# Patient Record
Sex: FEMALE | Race: WHITE | Employment: OTHER | URBAN - METROPOLITAN AREA
[De-identification: names, ages, dates, MRNs, and addresses within clinical notes are randomized per-mention and may not be internally consistent; named-entity substitution may affect disease eponyms.]

---

## 2017-01-11 ENCOUNTER — TELEPHONE (OUTPATIENT)
Dept: BONE DENSITY | Facility: CLINIC | Age: 72
End: 2017-01-11

## 2017-01-25 ENCOUNTER — TELEPHONE (OUTPATIENT)
Dept: BONE DENSITY | Facility: CLINIC | Age: 72
End: 2017-01-25

## 2017-02-09 ENCOUNTER — TELEPHONE (OUTPATIENT)
Dept: BONE DENSITY | Facility: CLINIC | Age: 72
End: 2017-02-09

## 2017-04-05 DIAGNOSIS — J44.9 CHRONIC OBSTRUCTIVE PULMONARY DISEASE, UNSPECIFIED COPD TYPE (H): ICD-10-CM

## 2017-04-05 NOTE — TELEPHONE ENCOUNTER
tiotropium (SPIRIVA RESPIMAT) 2.5 MCG/ACT inhalation aerosol    Last Written Prescription Date: 11/3/16  Last Fill Quantity: 12 GRAMS, # refills: 0    Last Office Visit with [facility], P or [facility] prescribing provider:  11/16/16    Patient needs this sent to a new pharmacy, new prescription.  Thanks.

## 2017-07-06 ENCOUNTER — TELEPHONE (OUTPATIENT)
Dept: PEDIATRICS | Facility: CLINIC | Age: 72
End: 2017-07-06

## 2017-07-06 NOTE — TELEPHONE ENCOUNTER
CT scan of chest was ordered in 11/2016.  Order expires in November 2017.  Gave patient the number for Radiology scheduling to set up an appointment.  No further questions.  Will call back if any other questions or concerns.  CAROL Rollins RN

## 2017-07-11 ENCOUNTER — RADIANT APPOINTMENT (OUTPATIENT)
Dept: MAMMOGRAPHY | Facility: CLINIC | Age: 72
End: 2017-07-11
Attending: INTERNAL MEDICINE
Payer: COMMERCIAL

## 2017-07-11 ENCOUNTER — RADIANT APPOINTMENT (OUTPATIENT)
Dept: BONE DENSITY | Facility: CLINIC | Age: 72
End: 2017-07-11
Attending: INTERNAL MEDICINE
Payer: COMMERCIAL

## 2017-07-11 DIAGNOSIS — E78.5 HYPERLIPIDEMIA WITH TARGET LDL LESS THAN 160: ICD-10-CM

## 2017-07-11 DIAGNOSIS — M85.80 OSTEOPENIA: ICD-10-CM

## 2017-07-11 DIAGNOSIS — Z12.31 ENCOUNTER FOR SCREENING MAMMOGRAM FOR BREAST CANCER: ICD-10-CM

## 2017-07-11 PROCEDURE — 80061 LIPID PANEL: CPT | Performed by: INTERNAL MEDICINE

## 2017-07-11 PROCEDURE — 77063 BREAST TOMOSYNTHESIS BI: CPT | Mod: TC

## 2017-07-11 PROCEDURE — 36415 COLL VENOUS BLD VENIPUNCTURE: CPT | Performed by: INTERNAL MEDICINE

## 2017-07-11 PROCEDURE — G0202 SCR MAMMO BI INCL CAD: HCPCS | Mod: TC

## 2017-07-11 PROCEDURE — 77085 DXA BONE DENSITY AXL VRT FX: CPT | Performed by: FAMILY MEDICINE

## 2017-07-13 LAB
CHOLEST SERPL-MCNC: 214 MG/DL
HDLC SERPL-MCNC: 52 MG/DL
LDLC SERPL CALC-MCNC: 138 MG/DL
NONHDLC SERPL-MCNC: 162 MG/DL
TRIGL SERPL-MCNC: 122 MG/DL

## 2017-07-18 ENCOUNTER — MYC MEDICAL ADVICE (OUTPATIENT)
Dept: PEDIATRICS | Facility: CLINIC | Age: 72
End: 2017-07-18

## 2017-07-19 ENCOUNTER — HOSPITAL ENCOUNTER (OUTPATIENT)
Dept: CT IMAGING | Facility: CLINIC | Age: 72
Discharge: HOME | End: 2017-07-19
Attending: INTERNAL MEDICINE | Admitting: INTERNAL MEDICINE
Payer: COMMERCIAL

## 2017-07-19 DIAGNOSIS — F17.200 CURRENT SMOKER: ICD-10-CM

## 2017-07-19 DIAGNOSIS — Z87.891 HISTORY OF TOBACCO USE: ICD-10-CM

## 2017-07-19 PROCEDURE — G0297 LDCT FOR LUNG CA SCREEN: HCPCS

## 2017-07-26 ENCOUNTER — OFFICE VISIT (OUTPATIENT)
Dept: PEDIATRICS | Facility: CLINIC | Age: 72
End: 2017-07-26
Payer: COMMERCIAL

## 2017-07-26 VITALS
BODY MASS INDEX: 22.45 KG/M2 | TEMPERATURE: 98.4 F | WEIGHT: 122 LBS | HEIGHT: 62 IN | SYSTOLIC BLOOD PRESSURE: 130 MMHG | OXYGEN SATURATION: 97 % | DIASTOLIC BLOOD PRESSURE: 68 MMHG | HEART RATE: 70 BPM

## 2017-07-26 DIAGNOSIS — J44.9 CHRONIC OBSTRUCTIVE PULMONARY DISEASE, UNSPECIFIED COPD TYPE (H): ICD-10-CM

## 2017-07-26 DIAGNOSIS — E55.9 VITAMIN D DEFICIENCY: ICD-10-CM

## 2017-07-26 DIAGNOSIS — E06.3 HYPOTHYROIDISM DUE TO HASHIMOTO'S THYROIDITIS: ICD-10-CM

## 2017-07-26 DIAGNOSIS — M85.80 OSTEOPENIA, UNSPECIFIED LOCATION: ICD-10-CM

## 2017-07-26 DIAGNOSIS — Z00.00 ROUTINE GENERAL MEDICAL EXAMINATION AT A HEALTH CARE FACILITY: Primary | ICD-10-CM

## 2017-07-26 DIAGNOSIS — Z13.1 SCREENING FOR DIABETES MELLITUS: ICD-10-CM

## 2017-07-26 DIAGNOSIS — R70.0 ELEVATED ERYTHROCYTE SEDIMENTATION RATE: ICD-10-CM

## 2017-07-26 LAB
CRP SERPL-MCNC: 10 MG/L (ref 0–8)
ERYTHROCYTE [SEDIMENTATION RATE] IN BLOOD BY WESTERGREN METHOD: 36 MM/H (ref 0–30)

## 2017-07-26 PROCEDURE — 86140 C-REACTIVE PROTEIN: CPT | Performed by: INTERNAL MEDICINE

## 2017-07-26 PROCEDURE — 99213 OFFICE O/P EST LOW 20 MIN: CPT | Mod: 25 | Performed by: INTERNAL MEDICINE

## 2017-07-26 PROCEDURE — 85652 RBC SED RATE AUTOMATED: CPT | Performed by: INTERNAL MEDICINE

## 2017-07-26 PROCEDURE — 80053 COMPREHEN METABOLIC PANEL: CPT | Performed by: INTERNAL MEDICINE

## 2017-07-26 PROCEDURE — 84443 ASSAY THYROID STIM HORMONE: CPT | Performed by: INTERNAL MEDICINE

## 2017-07-26 PROCEDURE — 36415 COLL VENOUS BLD VENIPUNCTURE: CPT | Performed by: INTERNAL MEDICINE

## 2017-07-26 PROCEDURE — 99397 PER PM REEVAL EST PAT 65+ YR: CPT | Performed by: INTERNAL MEDICINE

## 2017-07-26 PROCEDURE — 82306 VITAMIN D 25 HYDROXY: CPT | Performed by: INTERNAL MEDICINE

## 2017-07-26 PROCEDURE — 84439 ASSAY OF FREE THYROXINE: CPT | Performed by: INTERNAL MEDICINE

## 2017-07-26 NOTE — PROGRESS NOTES
SUBJECTIVE:   Amberly Palafox is a 72 year old female who presents for Preventive Visit.  Are you in the first 12 months of your Medicare coverage?  No    Physical   Annual:     Getting at least 3 servings of Calcium per day::  Yes    Bi-annual eye exam::  Yes    Dental care twice a year::  NO    Sleep apnea or symptoms of sleep apnea::  None    Diet::  Regular (no restrictions)    Taking medications regularly::  Yes    Medication side effects::  None    Additional concerns today::  YES    Continues to have intermittent swelling of arms. Present for years. ESR/CRP have been elevated to variable degrees. Has seen rheum and all work-up negative. Recent lung cancer screening CT with borderline enlarged LN in each axilla. Had been enlarged a couple years back when did CT to look for vascular obstruction causing swelling. Had negative mammo recently.    Osteopenia - taking 500 mg calcium. Went on a plant based diet prior to getting cholesterol done, cut the eggs down from 2 to 1 and has sausage and sarmiento.    Tobacco use/COPD: half a pack a day. Has had increased SOB when going up stairs. Using spiriva every day. Doesn't use albuterol often.    COGNITIVE SCREEN  1) Repeat 3 items (Banana, Sunrise, Chair)    2) Clock draw: NORMAL  3) 3 item recall: Recalls 3 objects  Results: 3 items recalled: COGNITIVE IMPAIRMENT LESS LIKELY    Mini-CogTM Copyright S Santiago. Licensed by the author for use in [facility]; reprinted with permission (noa@.[facility]). All rights reserved.          Reviewed and updated as needed this visit by clinical staff  Tobacco  Allergies  Med Hx  Surg Hx  Fam Hx  Soc Hx        Reviewed and updated as needed this visit by Provider        Social History   Substance Use Topics     Smoking status: Current Every Day Smoker     Packs/day: 0.50     Years: 50.00     Types: Cigarettes     Smokeless tobacco: Never Used     Alcohol use No     The patient does not drink >3 drinks per day nor >7  drinks per week.    -------------------------------------    Today's PHQ-2 Score:   PHQ-2 ( 1999 [employer]) 7/26/2017   Q1: Little interest or pleasure in doing things 0   Q2: Feeling down, depressed or hopeless 0   PHQ-2 Score 0   Q1: Little interest or pleasure in doing things Not at all   Q2: Feeling down, depressed or hopeless Not at all   PHQ-2 Score 0     Do you feel safe in your environment - Yes    Do you have a Health Care Directive?: Yes: Patient states has Advance Directive and will bring in a copy to clinic.    Current providers sharing in care for this patient include:   Patient Care Team:  Luz Lopez MD as PCP - General (Internal Medicine)      Hearing impairment: Yes, turning television up louder    Ability to successfully perform activities of daily living: Yes, no assistance needed     Fall risk:         Home safety:  none identified    The following health maintenance items are reviewed in Epic and correct as of today:  Health Maintenance   Topic Date Due     COPD ACTION PLAN Q1 YR  1945     ADVANCE DIRECTIVE PLANNING Q5 YRS  03/16/2002     INFLUENZA VACCINE (SYSTEM ASSIGNED)  09/01/2017     TSH Q1 YEAR  11/16/2017     FALL RISK ASSESSMENT  11/16/2017     LIPID MONITORING Q1 YEAR  07/11/2018     LUNG CANCER SCREENING ANNUAL  07/19/2018     MAMMO SCREEN Q2 YR (SYSTEM ASSIGNED)  07/11/2019     DEXA Q3 YR  07/11/2020     COLON CANCER SCREEN (SYSTEM ASSIGNED)  11/01/2021     TETANUS IMMUNIZATION (SYSTEM ASSIGNED)  09/10/2024     SPIROMETRY ONETIME  Completed     PNEUMOCOCCAL  Completed     HEPATITIS C SCREENING  Completed     Patient Active Problem List   Diagnosis     Osteopenia     Hypothyroidism     Hyperlipidemia with target LDL less than 160     COPD (chronic obstructive pulmonary disease) (H)     Tobacco abuse     Past Surgical History:   Procedure Laterality Date     no surgeries         Social History   Substance Use Topics     Smoking status: Current Every Day Smoker      "Packs/day: 0.50     Years: 50.00     Types: Cigarettes     Smokeless tobacco: Never Used     Alcohol use No     Family History   Problem Relation Age of Onset     Thyroid Disease Mother      hypothyroid     HEART DISEASE Mother      s/p stents     Hypertension Mother      Thyroid Disease Daughter      hypothyroid     HEART DISEASE Maternal Grandmother 94         ROS:  Constitutional, HEENT, cardiovascular, pulmonary, GI, , musculoskeletal, neuro, skin, endocrine and psych systems are negative, except as otherwise noted.      OBJECTIVE:   /68 (BP Location: Left arm, Patient Position: Chair, Cuff Size: Adult Regular)  Pulse 70  Temp 98.4  F (36.9  C) (Oral)  Ht 5' 1.5\" (1.562 m)  Wt 122 lb (55.3 kg)  SpO2 97%  BMI 22.68 kg/m2 Estimated body mass index is 22.68 kg/(m^2) as calculated from the following:    Height as of this encounter: 5' 1.5\" (1.562 m).    Weight as of this encounter: 122 lb (55.3 kg).  EXAM:   GENERAL APPEARANCE: healthy, alert and no distress  EYES: Eyes grossly normal to inspection, PERRL and conjunctivae and sclerae normal  HENT: ear canals and TM's normal, nose and mouth without ulcers or lesions, oropharynx clear and oral mucous membranes moist  NECK: no adenopathy, no asymmetry, masses, or scars and thyroid normal to palpation  RESP: lungs clear to auscultation - no rales, rhonchi or wheezes  BREAST: no palpable axillary masses or adenopathy  CV: regular rate and rhythm, normal S1 S2, no S3 or S4, no murmur, click or rub, no peripheral edema and peripheral pulses strong  ABDOMEN: soft, nontender, no hepatosplenomegaly, no masses and bowel sounds normal  MS: no musculoskeletal defects are noted and gait is age appropriate without ataxia  SKIN: no suspicious lesions or rashes  NEURO: Normal strength and tone, sensory exam grossly normal, mentation intact and speech normal  PSYCH: mentation appears normal and affect normal/bright    Results for orders placed or performed in visit on " 07/26/17   Vitamin D Deficiency   Result Value Ref Range    Vitamin D Deficiency screening 57 20 - 75 ug/L   Comprehensive metabolic panel   Result Value Ref Range    Sodium 138 133 - 144 mmol/L    Potassium 4.0 3.4 - 5.3 mmol/L    Chloride 102 94 - 109 mmol/L    Carbon Dioxide 28 20 - 32 mmol/L    Anion Gap 8 3 - 14 mmol/L    Glucose 80 70 - 99 mg/dL    Urea Nitrogen 12 7 - 30 mg/dL    Creatinine 0.57 0.52 - 1.04 mg/dL    GFR Estimate >90  Non  GFR Calc   >60 mL/min/1.7m2    GFR Estimate If Black >90   GFR Calc   >60 mL/min/1.7m2    Calcium 8.8 8.5 - 10.1 mg/dL    Bilirubin Total 0.5 0.2 - 1.3 mg/dL    Albumin 4.0 3.4 - 5.0 g/dL    Protein Total 8.2 6.8 - 8.8 g/dL    Alkaline Phosphatase 77 40 - 150 U/L    ALT 16 0 - 50 U/L    AST 12 0 - 45 U/L   TSH   Result Value Ref Range    TSH 0.97 0.40 - 4.00 mU/L   T4, free   Result Value Ref Range    T4 Free 1.57 (H) 0.76 - 1.46 ng/dL   Erythrocyte sedimentation rate auto   Result Value Ref Range    Sed Rate 36 (H) 0 - 30 mm/h   CRP inflammation   Result Value Ref Range    CRP Inflammation 10.0 (H) 0.0 - 8.0 mg/L       ASSESSMENT / PLAN:   1. Routine general medical examination at a health care facility  Had recent dexa, mammo and lung cancer screening.   Zoster, Tdap, Pneumovax and Prevnar UTD    2. Hypothyroidism due to Hashimoto's thyroiditis  Controlled. Continue levothyroxine.  - TSH  - T4, free    3. Osteopenia, unspecified location  Stable on last DEXA. Discussed calcium/vitamin D    4. Vitamin D deficiency  Previously low. Now in good range.  - Vitamin D Deficiency    5. Elevated erythrocyte sedimentation rate  CRP/ESR remain mildly elevated. Unclear etiology. Could be related tobacco use. Encouraged cessation.  - Erythrocyte sedimentation rate auto  - CRP inflammation    6. Screening for diabetes mellitus  - Comprehensive metabolic panel    7. COPD - somewhat worse. Continues Spiriva. Consider spirometry vs adding LABA/ICS    End of  "Life Planning:  Patient currently has an advanced directive: Yes.  Practitioner is supportive of decision.    COUNSELING:  Reviewed preventive health counseling, as reflected in patient instructions  Special attention given to:       Regular exercise       Healthy diet/nutrition       Osteoporosis Prevention/Bone Health       Consider lung cancer screening for ages 55-80 years and 30 pack-year smoking history        Colon cancer screening       Advanced Planning     BP Screening:   Last 3 BP Readings:    BP Readings from Last 3 Encounters:   07/26/17 130/68   11/16/16 130/70   02/16/16 132/70       The following was recommended to the patient:  Re-screen BP within a year and recommended lifestyle modifications  Estimated body mass index is 22.68 kg/(m^2) as calculated from the following:    Height as of this encounter: 5' 1.5\" (1.562 m).    Weight as of this encounter: 122 lb (55.3 kg).     reports that she has been smoking Cigarettes.  She has a 25.00 pack-year smoking history. She has never used smokeless tobacco.  Tobacco Cessation Action Plan: Pharmacotherapies : Chantix    Appropriate preventive services were discussed with this patient, including applicable screening as appropriate for cardiovascular disease, diabetes, osteopenia/osteoporosis, and glaucoma.  As appropriate for age/gender, discussed screening for colorectal cancer, prostate cancer, breast cancer, and cervical cancer. Checklist reviewing preventive services available has been given to the patient.    Reviewed patients plan of care and provided an AVS. The Basic Care Plan (routine screening as documented in Health Maintenance) for Amberly meets the Care Plan requirement. This Care Plan has been established and reviewed with the Patient.    Counseling Resources:  ATP IV Guidelines  Pooled Cohorts Equation Calculator  Breast Cancer Risk Calculator  FRAX Risk Assessment  ICSI Preventive Guidelines  Dietary Guidelines for Americans, 2010  USDA's " MyPlate  ASA Prophylaxis  Lung CA Screening    Luz Lopez MD  [facility] TIMBO    Answers for HPI/ROS submitted by the patient on 7/26/2017   PHQ-2 Score: 0

## 2017-07-26 NOTE — NURSING NOTE
"Chief Complaint   Patient presents with     Wellness Visit     fasting        Initial /68 (BP Location: Left arm, Patient Position: Chair, Cuff Size: Adult Regular)  Pulse 70  Temp 98.4  F (36.9  C) (Oral)  Ht 5' 1.5\" (1.562 m)  Wt 122 lb (55.3 kg)  SpO2 97%  BMI 22.68 kg/m2 Estimated body mass index is 22.68 kg/(m^2) as calculated from the following:    Height as of this encounter: 5' 1.5\" (1.562 m).    Weight as of this encounter: 122 lb (55.3 kg).  Medication Reconciliation: complete.Larisa PACK MA      "

## 2017-07-26 NOTE — PATIENT INSTRUCTIONS
1. Labs today: diabetes screen, liver function, kidney function, thyroid function, vitamin D, sed rate and CRP  2. Repeat CT next summer  3. Recommend cut back or quit smoking  4. Follow-up in 1 year

## 2017-07-26 NOTE — MR AVS SNAPSHOT
After Visit Summary   7/26/2017    Amberly Palafox    MRN: [id number]           Patient Information     Date Of Birth          1945        Visit Information        Provider Department      7/26/2017 10:40 AM Luz Lopez MD [facility]        Today's Diagnoses     Routine general medical examination at a health care facility    -  1    Hypothyroidism due to Hashimoto's thyroiditis        Osteopenia, unspecified location        Vitamin D deficiency        Elevated erythrocyte sedimentation rate        Screening for diabetes mellitus          Care Instructions    1. Labs today: diabetes screen, liver function, kidney function, thyroid function, vitamin D, sed rate and CRP  2. Repeat CT next summer  3. Recommend cut back or quit smoking  4. Follow-up in 1 year          Follow-ups after your visit        Follow-up notes from your care team     Return in about 1 year (around 7/26/2018).      Who to contact     If you have questions or need follow up information about today's clinic visit or your schedule please contact [facility] directly at [phone].  Normal or non-critical lab and imaging results will be communicated to you by [employer], letter or phone within 4 business days after the clinic has received the results. If you do not hear from us within 7 days, please contact the clinic through [employer] or phone. If you have a critical or abnormal lab result, we will notify you by phone as soon as possible.  Submit refill requests through [employer] or call your pharmacy and they will forward the refill request to us. Please allow 3 business days for your refill to be completed.          Additional Information About Your Visit        MyChart Information     [employer] gives you secure access to your electronic health record. If you see a primary care provider, you can also send messages to your care team and make appointments. If you have questions, please call your  "primary care clinic.  If you do not have a primary care provider, please call [phone] and they will assist you.        Care EveryWhere ID     This is your Care EveryWhere ID. This could be used by other organizations to access your [place] medical records  [id number]        Your Vitals Were     Pulse Temperature Height Pulse Oximetry BMI (Body Mass Index)       70 98.4  F (36.9  C) (Oral) 5' 1.5\" (1.562 m) 97% 22.68 kg/m2        Blood Pressure from Last 3 Encounters:   07/26/17 130/68   11/16/16 130/70   02/16/16 132/70    Weight from Last 3 Encounters:   07/26/17 122 lb (55.3 kg)   11/16/16 127 lb 9.6 oz (57.9 kg)   02/16/16 129 lb (58.5 kg)              We Performed the Following     Comprehensive metabolic panel     CRP inflammation     Erythrocyte sedimentation rate auto     T4, free     TSH     Vitamin D Deficiency        Primary Care Provider Office Phone # Fax #    Luz Lopez [phone]       [facility] [place] [address] [place] [address]        Equal Access to Services     [facility]: Hadii aad ku hadasho Soomaali, waaxda luqadaha, qaybta kaalmada adeegyada, juwan negron hayramon miller . So [facility] [phone].    ATENCIÓN: Si habla español, tiene a back disposición servicios gratuitos de asistencia lingüística. Llame al [phone].    We comply with applicable federal civil rights laws and [place] laws. We do not discriminate on the basis of race, color, national origin, age, disability sex, sexual orientation or gender identity.            Thank you!     Thank you for choosing [facility]  for your care. Our goal is always to provide you with excellent care. Hearing back from our patients is one way we can continue to improve our services. Please take a few minutes to complete the written survey that you may receive in the mail after your visit with us. Thank you!             Your Updated Medication List - Protect others " around you: Learn how to safely use, store and throw away your medicines at www.disposemymeds.org.          This list is accurate as of: 7/26/17 11:28 AM.  Always use your most recent med list.                   Brand Name Dispense Instructions for use Diagnosis    albuterol 108 (90 BASE) MCG/ACT Inhaler    PROAIR HFA/PROVENTIL HFA/VENTOLIN HFA    3 Inhaler    Inhale 2 puffs into the lungs every 4 hours as needed for shortness of breath / dyspnea    COPD (chronic obstructive pulmonary disease) (H)       FISH OIL           levothyroxine 175 MCG tablet    SYNTHROID/LEVOTHROID    90 tablet    Take 1 tablet (175 mcg) by mouth daily    Hypothyroidism due to Hashimoto's thyroiditis       MULTIVITAMIN PO      Take  by mouth.        SUPER B COMPLEX PO      Take 1 tablet by mouth daily        tiotropium 2.5 MCG/ACT inhalation aerosol    SPIRIVA RESPIMAT    12 g    Inhale 2 puffs into the lungs daily    Chronic obstructive pulmonary disease, unspecified COPD type (H)       vitamin D 1000 UNITS capsule      Take 1 capsule by mouth daily

## 2017-07-27 LAB
ALBUMIN SERPL-MCNC: 4 G/DL (ref 3.4–5)
ALP SERPL-CCNC: 77 U/L (ref 40–150)
ALT SERPL W P-5'-P-CCNC: 16 U/L (ref 0–50)
ANION GAP SERPL CALCULATED.3IONS-SCNC: 8 MMOL/L (ref 3–14)
AST SERPL W P-5'-P-CCNC: 12 U/L (ref 0–45)
BILIRUB SERPL-MCNC: 0.5 MG/DL (ref 0.2–1.3)
BUN SERPL-MCNC: 12 MG/DL (ref 7–30)
CALCIUM SERPL-MCNC: 8.8 MG/DL (ref 8.5–10.1)
CHLORIDE SERPL-SCNC: 102 MMOL/L (ref 94–109)
CO2 SERPL-SCNC: 28 MMOL/L (ref 20–32)
CREAT SERPL-MCNC: 0.57 MG/DL (ref 0.52–1.04)
DEPRECATED CALCIDIOL+CALCIFEROL SERPL-MC: 57 UG/L (ref 20–75)
GFR SERPL CREATININE-BSD FRML MDRD: NORMAL ML/MIN/1.7M2
GLUCOSE SERPL-MCNC: 80 MG/DL (ref 70–99)
POTASSIUM SERPL-SCNC: 4 MMOL/L (ref 3.4–5.3)
PROT SERPL-MCNC: 8.2 G/DL (ref 6.8–8.8)
SODIUM SERPL-SCNC: 138 MMOL/L (ref 133–144)
T4 FREE SERPL-MCNC: 1.57 NG/DL (ref 0.76–1.46)
TSH SERPL DL<=0.05 MIU/L-ACNC: 0.97 MU/L (ref 0.4–4)

## 2017-11-18 DIAGNOSIS — E06.3 HYPOTHYROIDISM DUE TO HASHIMOTO'S THYROIDITIS: ICD-10-CM

## 2017-11-20 RX ORDER — LEVOTHYROXINE SODIUM 175 UG/1
TABLET ORAL
Qty: 90 TABLET | Refills: 2 | Status: SHIPPED | OUTPATIENT
Start: 2017-11-20 | End: 2018-08-14

## 2017-11-20 NOTE — TELEPHONE ENCOUNTER
Levothyroxine  175 mcg  Last Written Prescription Date: 11/16/16  Last Quantity: 90, # refills: 3  Last Office Visit with [facility], [facility] or [facility] prescribing provider: 07/26/17        TSH   Date Value Ref Range Status   07/26/2017 0.97 0.40 - 4.00 mU/L Final   Prescription approved per [facility] Refill Protocol.  CAROL Rollins RN

## 2017-12-29 DIAGNOSIS — J44.9 CHRONIC OBSTRUCTIVE PULMONARY DISEASE, UNSPECIFIED COPD TYPE (H): ICD-10-CM

## 2017-12-29 NOTE — TELEPHONE ENCOUNTER
Pt states that she usually get 3 months supply of spiriva, but she has med her deductible for rx this year, running out of med. She usually pays $90 for 3 months supply, with the deductible met she has to pay more than $200 for 3 months supply. But 2 months supply will cost her $106. So, she is requesting us to sent one time rx to [facility] for 2 months supply.     Sent rx as per pt's request. Pt will call us in the beginning of Feb to request new rx for 3 month supplies again. LOV was on 07/26/17.    Brian, RN  Triage Nurse

## 2018-01-01 ENCOUNTER — TELEPHONE (OUTPATIENT)
Dept: PULMONOLOGY | Facility: CLINIC | Age: 73
End: 2018-01-01

## 2018-04-24 ENCOUNTER — TRANSFERRED RECORDS (OUTPATIENT)
Dept: HEALTH INFORMATION MANAGEMENT | Facility: CLINIC | Age: 73
End: 2018-04-24

## 2018-05-23 ENCOUNTER — DOCUMENTATION ONLY (OUTPATIENT)
Dept: LAB | Facility: CLINIC | Age: 73
End: 2018-05-23

## 2018-05-23 DIAGNOSIS — E06.3 HYPOTHYROIDISM DUE TO HASHIMOTO'S THYROIDITIS: Primary | ICD-10-CM

## 2018-05-23 DIAGNOSIS — E78.5 HYPERLIPIDEMIA WITH TARGET LDL LESS THAN 160: ICD-10-CM

## 2018-05-23 DIAGNOSIS — Z13.1 SCREENING FOR DIABETES MELLITUS: ICD-10-CM

## 2018-05-29 DIAGNOSIS — E78.5 HYPERLIPIDEMIA WITH TARGET LDL LESS THAN 160: ICD-10-CM

## 2018-05-29 DIAGNOSIS — Z13.1 SCREENING FOR DIABETES MELLITUS: ICD-10-CM

## 2018-05-29 DIAGNOSIS — E06.3 HYPOTHYROIDISM DUE TO HASHIMOTO'S THYROIDITIS: ICD-10-CM

## 2018-05-29 LAB
ALBUMIN SERPL-MCNC: 4 G/DL (ref 3.4–5)
ALP SERPL-CCNC: 70 U/L (ref 40–150)
ALT SERPL W P-5'-P-CCNC: 17 U/L (ref 0–50)
ANION GAP SERPL CALCULATED.3IONS-SCNC: 7 MMOL/L (ref 3–14)
AST SERPL W P-5'-P-CCNC: 12 U/L (ref 0–45)
BILIRUB SERPL-MCNC: 0.4 MG/DL (ref 0.2–1.3)
BUN SERPL-MCNC: 11 MG/DL (ref 7–30)
CALCIUM SERPL-MCNC: 9 MG/DL (ref 8.5–10.1)
CHLORIDE SERPL-SCNC: 101 MMOL/L (ref 94–109)
CHOLEST SERPL-MCNC: 218 MG/DL
CO2 SERPL-SCNC: 28 MMOL/L (ref 20–32)
CREAT SERPL-MCNC: 0.55 MG/DL (ref 0.52–1.04)
CRP SERPL-MCNC: 9 MG/L (ref 0–8)
GFR SERPL CREATININE-BSD FRML MDRD: >90 ML/MIN/1.7M2
GLUCOSE SERPL-MCNC: 95 MG/DL (ref 70–99)
HDLC SERPL-MCNC: 38 MG/DL
LDLC SERPL CALC-MCNC: 146 MG/DL
NONHDLC SERPL-MCNC: 180 MG/DL
POTASSIUM SERPL-SCNC: 4 MMOL/L (ref 3.4–5.3)
PROT SERPL-MCNC: 8.3 G/DL (ref 6.8–8.8)
SODIUM SERPL-SCNC: 136 MMOL/L (ref 133–144)
T4 FREE SERPL-MCNC: 1.28 NG/DL (ref 0.76–1.46)
TRIGL SERPL-MCNC: 169 MG/DL
TSH SERPL DL<=0.005 MIU/L-ACNC: 5.07 MU/L (ref 0.4–4)

## 2018-05-29 PROCEDURE — 84443 ASSAY THYROID STIM HORMONE: CPT | Performed by: INTERNAL MEDICINE

## 2018-05-29 PROCEDURE — 80061 LIPID PANEL: CPT | Performed by: INTERNAL MEDICINE

## 2018-05-29 PROCEDURE — 84439 ASSAY OF FREE THYROXINE: CPT | Performed by: INTERNAL MEDICINE

## 2018-05-29 PROCEDURE — 86140 C-REACTIVE PROTEIN: CPT | Performed by: INTERNAL MEDICINE

## 2018-05-29 PROCEDURE — 80053 COMPREHEN METABOLIC PANEL: CPT | Performed by: INTERNAL MEDICINE

## 2018-05-29 PROCEDURE — 36415 COLL VENOUS BLD VENIPUNCTURE: CPT | Performed by: INTERNAL MEDICINE

## 2018-06-06 ENCOUNTER — OFFICE VISIT (OUTPATIENT)
Dept: PEDIATRICS | Facility: CLINIC | Age: 73
End: 2018-06-06
Payer: COMMERCIAL

## 2018-06-06 VITALS
TEMPERATURE: 96.9 F | HEIGHT: 62 IN | HEART RATE: 79 BPM | SYSTOLIC BLOOD PRESSURE: 160 MMHG | DIASTOLIC BLOOD PRESSURE: 72 MMHG | BODY MASS INDEX: 24.05 KG/M2 | OXYGEN SATURATION: 99 % | WEIGHT: 130.7 LBS

## 2018-06-06 DIAGNOSIS — E78.2 MIXED HYPERLIPIDEMIA: ICD-10-CM

## 2018-06-06 DIAGNOSIS — R03.0 ELEVATED BLOOD PRESSURE READING WITHOUT DIAGNOSIS OF HYPERTENSION: ICD-10-CM

## 2018-06-06 DIAGNOSIS — R94.6 THYROID FUNCTION TEST ABNORMAL: ICD-10-CM

## 2018-06-06 DIAGNOSIS — R93.1 ELEVATED CORONARY ARTERY CALCIUM SCORE: Primary | ICD-10-CM

## 2018-06-06 DIAGNOSIS — Z72.0 TOBACCO ABUSE: ICD-10-CM

## 2018-06-06 DIAGNOSIS — E55.9 VITAMIN D DEFICIENCY: ICD-10-CM

## 2018-06-06 DIAGNOSIS — J44.9 CHRONIC OBSTRUCTIVE PULMONARY DISEASE, UNSPECIFIED COPD TYPE (H): ICD-10-CM

## 2018-06-06 PROCEDURE — 99207 C PAF COMPLETED  NO CHARGE: CPT | Performed by: INTERNAL MEDICINE

## 2018-06-06 PROCEDURE — 99214 OFFICE O/P EST MOD 30 MIN: CPT | Performed by: INTERNAL MEDICINE

## 2018-06-06 PROCEDURE — 93000 ELECTROCARDIOGRAM COMPLETE: CPT | Performed by: INTERNAL MEDICINE

## 2018-06-06 RX ORDER — ATORVASTATIN CALCIUM 20 MG/1
20 TABLET, FILM COATED ORAL DAILY
Qty: 90 TABLET | Refills: 1 | Status: SHIPPED | OUTPATIENT
Start: 2018-06-06 | End: 2018-08-23

## 2018-06-06 NOTE — PROGRESS NOTES
"  SUBJECTIVE:   Amberly Palafox is a 73 year old female who presents to clinic today for the following health issues:    Follow up on heart scan and cholesterol lab    Son-in-law needed open heart surgery for valve issue and also found to have coronary disease. Daughter had Calcium score and was in 96% for age - runner, eats healthy, quit smoking many years ago. Also with history of hypothyroidism. Encouraged patient to get calcium score done as well. Patient scored in 91% for age. Interpretation noted highest score in RCA, then LAD less in LCx - noted that based on her numbers, she definitely has non-obstructive CAD and has a 30-40% chance of also having obstructive CAD. Patient denies chest pain or SOB.    Mother with 3 stents - first possibly around age 58    Smoking almost a pack a day. Had been down to about 5/day before winter. Has never quit in the past. Prescribed chantix in the past, but never started taking the medication. Has had chronically elevated CRP levels and has had mildly elevated chol levels with 10 year risk in range to start medication, but has declined cholesterol medication in the past. Now interested in starting. Already started ASA 81 mg.    Reviewed and updated as needed this visit by clinical staff  Tobacco  Allergies  Meds  Problems  Med Hx  Surg Hx  Fam Hx  Soc Hx        Reviewed and updated as needed this visit by Provider  Allergies  Meds  Problems         -------------------------------------    ROS:  Constitutional, HEENT, cardiovascular, pulmonary, gi and gu systems are negative, except as otherwise noted.    OBJECTIVE:                                                    /72 (BP Location: Right arm, Patient Position: Sitting, Cuff Size: Adult Regular)  Pulse 79  Temp 96.9  F (36.1  C) (Tympanic)  Ht 5' 2\" (1.575 m)  Wt 130 lb 11.2 oz (59.3 kg)  SpO2 99%  BMI 23.91 kg/m2   Body mass index is 23.91 kg/(m^2).  General Appearance: healthy, alert and no " distress  Eyes:   no discharge, erythema.  Normal pupils.  Respiratory: lungs clear to auscultation - no rales, rhonchi or wheezes.  Cardiovascular: regular rate and rhythm, normal S1 S2, no S3 or S4 and no murmur, click or rub.  No peripheral edema.  Skin: no rashes or lesions.  Well perfused and normal turgor.    Diagnostic Test Results:  EKG: with RSR pattern in V1, possibly right atrial enlargement. No q-waves or ST changes    Results for orders placed or performed in visit on 05/29/18   **Comprehensive metabolic panel FUTURE anytime   Result Value Ref Range    Sodium 136 133 - 144 mmol/L    Potassium 4.0 3.4 - 5.3 mmol/L    Chloride 101 94 - 109 mmol/L    Carbon Dioxide 28 20 - 32 mmol/L    Anion Gap 7 3 - 14 mmol/L    Glucose 95 70 - 99 mg/dL    Urea Nitrogen 11 7 - 30 mg/dL    Creatinine 0.55 0.52 - 1.04 mg/dL    GFR Estimate >90 >60 mL/min/1.7m2    GFR Estimate If Black >90 >60 mL/min/1.7m2    Calcium 9.0 8.5 - 10.1 mg/dL    Bilirubin Total 0.4 0.2 - 1.3 mg/dL    Albumin 4.0 3.4 - 5.0 g/dL    Protein Total 8.3 6.8 - 8.8 g/dL    Alkaline Phosphatase 70 40 - 150 U/L    ALT 17 0 - 50 U/L    AST 12 0 - 45 U/L   Lipid panel reflex to direct LDL Fasting   Result Value Ref Range    Cholesterol 218 (H) <200 mg/dL    Triglycerides 169 (H) <150 mg/dL    HDL Cholesterol 38 (L) >49 mg/dL    LDL Cholesterol Calculated 146 (H) <100 mg/dL    Non HDL Cholesterol 180 (H) <130 mg/dL   CRP inflammation   Result Value Ref Range    CRP Inflammation 9.0 (H) 0.0 - 8.0 mg/L   TSH   Result Value Ref Range    TSH 5.07 (H) 0.40 - 4.00 mU/L   T4, free   Result Value Ref Range    T4 Free 1.28 0.76 - 1.46 ng/dL        ASSESSMENT/PLAN:                                                      (R93.1) Elevated coronary artery calcium score  (primary encounter diagnosis)  Comment: discussed at length with patient. She definitely has some CAD. Discussed quitting smoking, 81 mg ASA and statin. BP also elevated today, but very stressed about  results and previously normal.  Plan: EKG 12-lead complete w/read - Clinics, Exercise        Stress Echocardiogram  - ASA 81 mg  - start atorvastatin 20 mg daily - discussed possible side effects - will recheck in 2.5 months with annual wellness visit  - encouraged smoking cessation - she will consider chantix   - will also obtain stress echo to look for obstructive disease    (E78.2) Mixed hyperlipidemia  Comment: CAD based on calcium score significantly increases risk from previous elevated risk. Discussed at length. Also with elevated CRP  Plan: atorvastatin (LIPITOR) 20 MG tablet, Lipid         panel reflex to direct LDL Fasting  - start atorvastatin 20 mg daily - discussed possible side effects - will recheck in 2.5 months with annual wellness visit    (E55.9) Vitamin D deficiency  Comment: low in the past  Plan: Vitamin D Deficiency  - will recheck with lab with annual wellness visit    (R94.6) Thyroid function test abnormal  Comment: slightly off  Plan: TSH, T4, free  - recheck with next labs  - continue levothyroxine 175 mcg once a day    (Z72.0) Tobacco abuse  Plan:   - encouraged smoking cessation - she will consider chantix   - also discussed possibility of MTM    (J44.9) Chronic obstructive pulmonary disease, unspecified COPD type (H)  Comment: stable  Plan:   - encouraged smoking cessation  - albuterol as needed    (R03.0) Elevated blood pressure reading without diagnosis of hypertension  Comment: likely elevated due to stress of discussing results of calcium score. Previously normal. No headaches.  Plan:   - will start monitoring BP at home  - recheck at next visit      BP Screening:   Last 3 BP Readings:    BP Readings from Last 3 Encounters:   06/06/18 160/72   07/26/17 130/68   11/16/16 130/70       The following was recommended to the patient:  Re-screen within 4 weeks and recommend lifestyle modifications  Tobacco Cessation:   reports that she has been smoking Cigarettes.  She has a 25.00  pack-year smoking history. She has never used smokeless tobacco.  Tobacco Cessation Action Plan: Information offered: Patient not interested at this time      Follow up with Provider - annual wellness visit scheduled     [facility] TIMBO

## 2018-06-06 NOTE — MR AVS SNAPSHOT
After Visit Summary   6/6/2018    Amberly Palafox    MRN: [id number]           Patient Information     Date Of Birth          1945        Visit Information        Provider Department      6/6/2018 10:00 AM Luz Lopez MD [facility] [place]        Today's Diagnoses     Elevated coronary artery calcium score    -  1    Mixed hyperlipidemia        Vitamin D deficiency        Thyroid function test abnormal        Tobacco abuse          Care Instructions    1. You will be contacted to set up the stress test  - [facility] location: [phone]  2. Start atorvastatin 20 mg once a day  3. Continue aspirin  4. Recommend quitting smoking  - if start chantix. Pick quit date and start the medication 1 week before  5. Will repeat cholesterol with physical  6. Will recheck. Blood pressure at physical          Follow-ups after your visit        Follow-up notes from your care team     Return in about 10 weeks (around 8/15/2018).      Your next 10 appointments already scheduled     Aug 22, 2018 10:20 AM CDT   PHYSICAL with Luz Lopez MD   [facility] [place] ([facility])    [address] [place] [id number] [phone]              Future tests that were ordered for you today     Open Future Orders        Priority Expected Expires Ordered    Exercise Stress Echocardiogram Routine  6/6/2019 6/6/2018    Lipid panel reflex to direct LDL Fasting Routine  11/6/2018 6/6/2018    TSH Routine  11/6/2018 6/6/2018    T4, free Routine  11/6/2018 6/6/2018    Vitamin D Deficiency Routine  11/6/2018 6/6/2018            Who to contact     If you have questions or need follow up information about today's clinic visit or your schedule please contact [facility] directly at [phone].  Normal or non-critical lab and imaging results will be communicated to you by MyChart, letter or phone within 4 business days after the clinic has  "received the results. If you do not hear from us within 7 days, please contact the clinic through [employer] or phone. If you have a critical or abnormal lab result, we will notify you by phone as soon as possible.  Submit refill requests through [employer]ogi or call your pharmacy and they will forward the refill request to us. Please allow 3 business days for your refill to be completed.          Additional Information About Your Visit        [employer] Information     [employer] gives you secure access to your electronic health record. If you see a primary care provider, you can also send messages to your care team and make appointments. If you have questions, please call your primary care clinic.  If you do not have a primary care provider, please call [phone] and they will assist you.        Care EveryWhere ID     This is your Care EveryWhere ID. This could be used by other organizations to access your [place] medical records  [id number]        Your Vitals Were     Pulse Temperature Height Pulse Oximetry BMI (Body Mass Index)       79 96.9  F (36.1  C) (Tympanic) 5' 2\" (1.575 m) 99% 23.91 kg/m2        Blood Pressure from Last 3 Encounters:   06/06/18 160/72   07/26/17 130/68   11/16/16 130/70    Weight from Last 3 Encounters:   06/06/18 130 lb 11.2 oz (59.3 kg)   07/26/17 122 lb (55.3 kg)   11/16/16 127 lb 9.6 oz (57.9 kg)              We Performed the Following     EKG 12-lead complete w/read - Clinics          Today's Medication Changes          These changes are accurate as of 6/6/18 11:14 AM.  If you have any questions, ask your nurse or doctor.               Start taking these medicines.        Dose/Directions    atorvastatin 20 MG tablet   Commonly known as:  LIPITOR   Used for:  Mixed hyperlipidemia   Started by:  Luz Lopez MD        Dose:  20 mg   Take 1 tablet (20 mg) by mouth daily   Quantity:  90 tablet   Refills:  1         These medicines have changed or have updated prescriptions.        " Dose/Directions    cholecalciferol 5000 units Caps capsule   Commonly known as:  vitamin D3   This may have changed:  Another medication with the same name was removed. Continue taking this medication, and follow the directions you see here.   Changed by:  Luz Lopez MD        Take by mouth daily   Refills:  0            Where to get your medicines      These medications were sent to [id number] - [place], [place] - [address]  [address] [facility], [place] [address]     Phone:  [phone]     atorvastatin 20 MG tablet                Primary Care Provider Office Phone # Fax #    Luz Lopez [phone] [address] [place] [address]        Equal Access to Services     [facility]: Hadii mathew stearns Soguilherme, waaxda luqadaha, qaybta kaalmada oj, juwan miller . So [facility] [phone].    ATENCIÓN: Si habla español, tiene a back disposición servicios gratuitos de asistencia lingüística. Llame al [phone].    We comply with applicable federal civil rights laws and [place] laws. We do not discriminate on the basis of race, color, national origin, age, disability, sex, sexual orientation, or gender identity.            Thank you!     Thank you for choosing [facility]  for your care. Our goal is always to provide you with excellent care. Hearing back from our patients is one way we can continue to improve our services. Please take a few minutes to complete the written survey that you may receive in the mail after your visit with us. Thank you!             Your Updated Medication List - Protect others around you: Learn how to safely use, store and throw away your medicines at www.disposemymeds.org.          This list is accurate as of 6/6/18 11:14 AM.  Always use your most recent med list.                   Brand Name Dispense Instructions for use Diagnosis    albuterol 108 (90 Base) MCG/ACT  Inhaler    PROAIR HFA/PROVENTIL HFA/VENTOLIN HFA    3 Inhaler    Inhale 2 puffs into the lungs every 4 hours as needed for shortness of breath / dyspnea    COPD (chronic obstructive pulmonary disease) (H)       atorvastatin 20 MG tablet    LIPITOR    90 tablet    Take 1 tablet (20 mg) by mouth daily    Mixed hyperlipidemia       cholecalciferol 5000 units Caps capsule    vitamin D3     Take by mouth daily        FISH OIL           levothyroxine 175 MCG tablet    SYNTHROID/LEVOTHROID    90 tablet    TAKE ONE TABLET BY MOUTH ONCE DAILY    Hypothyroidism due to Hashimoto's thyroiditis       MULTIVITAMIN PO      Take  by mouth.        SUPER B COMPLEX PO      Take 1 tablet by mouth daily        tiotropium 2.5 MCG/ACT inhalation aerosol    SPIRIVA RESPIMAT    8 g    Inhale 2 puffs into the lungs daily Please dispense 2 months supply    Chronic obstructive pulmonary disease, unspecified COPD type (H)

## 2018-06-06 NOTE — PATIENT INSTRUCTIONS
1. You will be contacted to set up the stress test  - [facility] location: [phone]  2. Start atorvastatin 20 mg once a day  3. Continue aspirin  4. Recommend quitting smoking  - if start chantix. Pick quit date and start the medication 1 week before  5. Will repeat cholesterol with physical  6. Will recheck. Blood pressure at physical

## 2018-06-20 ENCOUNTER — HOSPITAL ENCOUNTER (OUTPATIENT)
Dept: CARDIOLOGY | Facility: CLINIC | Age: 73
Discharge: HOME | End: 2018-06-20
Attending: INTERNAL MEDICINE | Admitting: INTERNAL MEDICINE
Payer: COMMERCIAL

## 2018-06-20 DIAGNOSIS — R93.1 ELEVATED CORONARY ARTERY CALCIUM SCORE: ICD-10-CM

## 2018-06-20 PROCEDURE — 93325 DOPPLER ECHO COLOR FLOW MAPG: CPT | Mod: 26 | Performed by: INTERNAL MEDICINE

## 2018-06-20 PROCEDURE — 93016 CV STRESS TEST SUPVJ ONLY: CPT | Performed by: INTERNAL MEDICINE

## 2018-06-20 PROCEDURE — 93325 DOPPLER ECHO COLOR FLOW MAPG: CPT | Mod: TC

## 2018-06-20 PROCEDURE — 93018 CV STRESS TEST I&R ONLY: CPT | Performed by: INTERNAL MEDICINE

## 2018-06-20 PROCEDURE — 93321 DOPPLER ECHO F-UP/LMTD STD: CPT | Mod: 26 | Performed by: INTERNAL MEDICINE

## 2018-06-20 PROCEDURE — 93350 STRESS TTE ONLY: CPT | Mod: 26 | Performed by: INTERNAL MEDICINE

## 2018-06-25 DIAGNOSIS — J44.9 CHRONIC OBSTRUCTIVE PULMONARY DISEASE, UNSPECIFIED COPD TYPE (H): ICD-10-CM

## 2018-06-25 NOTE — TELEPHONE ENCOUNTER
Requested Prescriptions   Pending Prescriptions Disp Refills     tiotropium (SPIRIVA RESPIMAT) 2.5 MCG/ACT inhalation aerosol 8 g 0     Sig: Inhale 2 puffs into the lungs daily Please dispense 2 months supply    There is no refill protocol information for this order        3 months prescription as soon as possible.

## 2018-06-26 NOTE — TELEPHONE ENCOUNTER
"Requested Prescriptions   Pending Prescriptions Disp Refills     tiotropium (SPIRIVA RESPIMAT) 2.5 MCG/ACT inhalation aerosol 8 g 0     Sig: Inhale 2 puffs into the lungs daily Please dispense 2 months supply    Asthma Maintenance Inhalers - Anticholinergics Passed    6/25/2018  2:18 PM       Passed - Patient is age 12 years or older       Passed - Recent (12 mo) or future (30 days) visit within the authorizing provider's specialty    Patient had office visit in the last 12 months or has a visit in the next 30 days with authorizing provider or within the authorizing provider's specialty.  See \"Patient Info\" tab in inbasket, or \"Choose Columns\" in Meds & Orders section of the refill encounter.          Last Written Prescription Date:  12/29/2017  Last Fill Quantity: 8g,  # refills: 0   Last office visit: 6/6/2018 with prescribing provider:  Luz Lopez   Future Office Visit:   Next 5 appointments (look out 90 days)     Aug 22, 2018 10:20 AM CDT   PHYSICAL with Luz Lopez MD   [facility]    [address] [place] [id number] [phone]                 Dx: COPD    Prescription approved per [facility] Refill Protocol.    Mandy RIVERA, RN, BSN, PHN  [place] [facility] RN    "

## 2018-08-14 DIAGNOSIS — E06.3 HYPOTHYROIDISM DUE TO HASHIMOTO'S THYROIDITIS: ICD-10-CM

## 2018-08-14 NOTE — TELEPHONE ENCOUNTER
"Requested Prescriptions   Pending Prescriptions Disp Refills     levothyroxine (SYNTHROID/LEVOTHROID) 175 MCG tablet [Pharmacy Med Name: LEVOTHYROXIN 175MCG TAB]    Last Written Prescription Date:  11/20/2017  Last Fill Quantity: 90,  # refills: 2  Last office visit: 6/6/2018 with prescribing provider:  Luz Lopez     Future Office Visit:   Next 5 appointments (look out 90 days)     Aug 22, 2018 10:20 AM CDT   PHYSICAL with Luz Lopez MD   [facility]    [address] [place] [address]  [address]  [facility] [address]   [phone]                  90 tablet 2     Sig: TAKE ONE TABLET BY MOUTH ONCE DAILY    Thyroid Protocol Failed    8/14/2018  3:34 PM       Failed - Normal TSH on file in past 12 months    Recent Labs   Lab Test  05/29/18   0921   TSH  5.07*             Passed - Patient is 12 years or older       Passed - Recent (12 mo) or future (30 days) visit within the authorizing provider's specialty    Patient had office visit in the last 12 months or has a visit in the next 30 days with authorizing provider or within the authorizing provider's specialty.  See \"Patient Info\" tab in inbasket, or \"Choose Columns\" in Meds & Orders section of the refill encounter.           Passed - No active pregnancy on record    If patient is pregnant or has had a positive pregnancy test, please check TSH.         Passed - No positive pregnancy test in past 12 months    If patient is pregnant or has had a positive pregnancy test, please check TSH.            "

## 2018-08-15 RX ORDER — LEVOTHYROXINE SODIUM 175 UG/1
TABLET ORAL
Qty: 90 TABLET | Refills: 2 | Status: SHIPPED | OUTPATIENT
Start: 2018-08-15 | End: 2019-01-01

## 2018-08-15 NOTE — TELEPHONE ENCOUNTER
Routing refill request to provider for review/approval because:  Labs out of range:  TSH: notes abnormal labs,plan to continue current dose however, future orders for TSH ordered.  Please advise if TSH should be checked  Luz Fitzpatrick RN - Triage  [facility]

## 2018-08-20 ENCOUNTER — TELEPHONE (OUTPATIENT)
Dept: PEDIATRICS | Facility: CLINIC | Age: 73
End: 2018-08-20

## 2018-08-20 NOTE — TELEPHONE ENCOUNTER
Patient calling to request an RX for an antibioitc-Azithromycin and Tessalon Pearles.  States she contacted bronchitis from her friends.  Had bronchitis 7 years ago and this is what they used to treat her back then.  Symptoms began over the weekend.  Today has a sore throat, cough that is nonproductive.  Nasal discharge is clear.  Denies wheezing, although I can hear an occasional wheeze while she is speaking.  Able to carry on a conversation without shortness of breath.  Denies shortness of breath.  Advised patient that she needs an appointment to discuss medication management.  She had already scheduled an appointment on Wed, and is refusing to see anyone else.  Advised Urgent care is available this evening, but patient states that symptoms aren't that bad yet, and she wants antibiotics now before it progresses.  Again advised an appointment tomorrow explaining that Dr. Lopez will not prescribe antibiotics without an office appointment, and patient refuses to come in before Wed.  Patient with history of COPD-advised her that it is not a good idea to wait until Wed to be seen.  Patient understands this, but declining to be seen earlier.  No further questions.  Will call back if any other questions or concerns.  CAROL Rollins RN  Also discussed with Dr. Lopez-patient needs an appointment for evaluation prior to ordering medication.  Patient notified of recommendation from Dr. Lopez.  Will wait until Wed to see Dr Lopez.  CAROL Rollins RN

## 2018-08-22 ENCOUNTER — OFFICE VISIT (OUTPATIENT)
Dept: PEDIATRICS | Facility: CLINIC | Age: 73
End: 2018-08-22
Payer: COMMERCIAL

## 2018-08-22 VITALS
WEIGHT: 122.8 LBS | HEART RATE: 92 BPM | HEIGHT: 62 IN | TEMPERATURE: 98.7 F | DIASTOLIC BLOOD PRESSURE: 70 MMHG | OXYGEN SATURATION: 98 % | SYSTOLIC BLOOD PRESSURE: 126 MMHG | BODY MASS INDEX: 22.6 KG/M2

## 2018-08-22 DIAGNOSIS — Z00.00 MEDICARE ANNUAL WELLNESS VISIT, SUBSEQUENT: Primary | ICD-10-CM

## 2018-08-22 DIAGNOSIS — E03.9 HYPOTHYROIDISM, UNSPECIFIED TYPE: ICD-10-CM

## 2018-08-22 DIAGNOSIS — J44.1 CHRONIC OBSTRUCTIVE PULMONARY DISEASE WITH ACUTE EXACERBATION (H): ICD-10-CM

## 2018-08-22 DIAGNOSIS — Z12.31 VISIT FOR SCREENING MAMMOGRAM: ICD-10-CM

## 2018-08-22 DIAGNOSIS — Z87.891 PERSONAL HISTORY OF TOBACCO USE: ICD-10-CM

## 2018-08-22 DIAGNOSIS — E55.9 VITAMIN D DEFICIENCY: ICD-10-CM

## 2018-08-22 DIAGNOSIS — E78.2 MIXED HYPERLIPIDEMIA: ICD-10-CM

## 2018-08-22 DIAGNOSIS — J20.9 ACUTE BRONCHITIS, UNSPECIFIED ORGANISM: ICD-10-CM

## 2018-08-22 PROCEDURE — 82306 VITAMIN D 25 HYDROXY: CPT | Performed by: INTERNAL MEDICINE

## 2018-08-22 PROCEDURE — 80061 LIPID PANEL: CPT | Performed by: INTERNAL MEDICINE

## 2018-08-22 PROCEDURE — 99213 OFFICE O/P EST LOW 20 MIN: CPT | Mod: 25 | Performed by: INTERNAL MEDICINE

## 2018-08-22 PROCEDURE — 99397 PER PM REEVAL EST PAT 65+ YR: CPT | Performed by: INTERNAL MEDICINE

## 2018-08-22 PROCEDURE — G0296 VISIT TO DETERM LDCT ELIG: HCPCS | Performed by: INTERNAL MEDICINE

## 2018-08-22 PROCEDURE — 36415 COLL VENOUS BLD VENIPUNCTURE: CPT | Performed by: INTERNAL MEDICINE

## 2018-08-22 PROCEDURE — 84443 ASSAY THYROID STIM HORMONE: CPT | Performed by: INTERNAL MEDICINE

## 2018-08-22 PROCEDURE — 84439 ASSAY OF FREE THYROXINE: CPT | Performed by: INTERNAL MEDICINE

## 2018-08-22 RX ORDER — AZITHROMYCIN 250 MG/1
TABLET, FILM COATED ORAL
Qty: 6 TABLET | Refills: 0 | Status: SHIPPED | OUTPATIENT
Start: 2018-08-22 | End: 2019-01-01

## 2018-08-22 RX ORDER — BENZONATATE 200 MG/1
200 CAPSULE ORAL 3 TIMES DAILY PRN
Qty: 21 CAPSULE | Refills: 0 | Status: SHIPPED | OUTPATIENT
Start: 2018-08-22 | End: 2019-01-01

## 2018-08-22 ASSESSMENT — ENCOUNTER SYMPTOMS
HEMATOCHEZIA: 0
ABDOMINAL PAIN: 0
NERVOUS/ANXIOUS: 1
FEVER: 1
DYSURIA: 0
EYE PAIN: 0
SORE THROAT: 1
HEADACHES: 0
BREAST MASS: 0
JOINT SWELLING: 1
COUGH: 1
FREQUENCY: 0
ARTHRALGIAS: 0
MYALGIAS: 1
PALPITATIONS: 0
WEAKNESS: 0
CHILLS: 1
NAUSEA: 0
HEMATURIA: 0
DIARRHEA: 0
DIZZINESS: 0
HEARTBURN: 0
SHORTNESS OF BREATH: 1
PARESTHESIAS: 0
CONSTIPATION: 0

## 2018-08-22 ASSESSMENT — ACTIVITIES OF DAILY LIVING (ADL)
I_NEED_ASSISTANCE_FOR_THE_FOLLOWING_DAILY_ACTIVITIES:: NO ASSISTANCE IS NEEDED
CURRENT_FUNCTION: NO ASSISTANCE NEEDED

## 2018-08-22 NOTE — PATIENT INSTRUCTIONS
Preventive Health Recommendations    Female Ages 65 +    Yearly exam:     See your health care provider every year in order to  o Review health changes.   o Discuss preventive care.    o Review your medicines if your doctor has prescribed any.      You no longer need a yearly Pap test unless you've had an abnormal Pap test in the past 10 years. If you have vaginal symptoms, such as bleeding or discharge, be sure to talk with your provider about a Pap test.      Every 1 to 2 years, have a mammogram.  If you are over 69, talk with your health care provider about whether or not you want to continue having screening mammograms.      Every 10 years, have a colonoscopy. Or, have a yearly FIT test (stool test). These exams will check for colon cancer.       Have a cholesterol test every 5 years, or more often if your doctor advises it.       Have a diabetes test (fasting glucose) every three years. If you are at risk for diabetes, you should have this test more often.       At age 65, have a bone density scan (DEXA) to check for osteoporosis (brittle bone disease).    Shots:    Get a flu shot each year.    Get a tetanus shot every 10 years.    Talk to your doctor about your pneumonia vaccines. There are now two you should receive - Pneumovax (PPSV 23) and Prevnar (PCV 13).    Talk to your pharmacist about the shingles vaccine.    Talk to your doctor about the hepatitis B vaccine.    Nutrition:     Eat at least 5 servings of fruits and vegetables each day.      Eat whole-grain bread, whole-wheat pasta and brown rice instead of white grains and rice.      Get adequate Calcium and Vitamin D.     Lifestyle    Exercise at least 150 minutes a week (30 minutes a day, 5 days a week). This will help you control your weight and prevent disease.      Limit alcohol to one drink per day.      No smoking.       Wear sunscreen to prevent skin cancer.       See your dentist twice a year for an exam and cleaning.      See your eye doctor  every 1 to 2 years to screen for conditions such as glaucoma, macular degeneration and cataracts.    ------------------  1. Labs today: cholesterol, thyroid function, vitamin D  2. Will send refills on simvastatin once labs are back, sent spiriva now  3. Tessalon pearls, azithromycin to [facility]  4. Recommend Shingrix vaccine - have at pharmacy  - 2nd dose 2-6 months after the first  5. Schedule mammogram - [phone]    Lung Cancer Screening   Frequently Asked Questions  If you are at high-risk for lung cancer, getting screened with low-dose computed tomography (LDCT) every year can help save your life. This handout offers answers to some of the most common questions about lung cancer screening. If you have other questions, please call [phone] ([phone]).     What is it?  Lung cancer screening uses special X-ray technology to create an image of your lung tissue. The exam is quick and easy and takes less than 10 seconds. We don t give you any medicine or use any needles. You can eat before and after the exam. You don t need to change your clothes as long as the clothing on your chest doesn t contain metal. But, you do need to be able to hold your breath for at least 6 seconds during the exam.    What is the goal of lung cancer screening?  The goal of lung cancer screening is to save lives. Many times, lung cancer is not found until a person starts having physical symptoms. Lung cancer screening can help detect lung cancer in the earliest stages when it may be easier to treat.    Who should be screened for lung cancer?  We suggest lung cancer screening for anyone who is at high-risk for lung cancer. You are in the high-risk group if you:      are between the ages of 55 and 79, and    have smoked at least 1 pack of cigarettes a day for 30 or more years, and    still smoke or have quit within the past 15 years.    However, if you have a new cough or shortness of breath, you should talk to your doctor  before being screened.    Some national lung health advocacy groups also recommend screening for people ages 50 to 79 who have smoked an average of 1 pack of cigarettes a day for 20 years. They must also have at least 1 other risk factor for lung cancer, not including exposure to secondhand smoke. Other risk factors are having had cancer in the past, emphysema, pulmonary fibrosis, COPD, a family history of lung cancer, or exposure to certain materials such as arsenic, asbestos, beryllium, cadmium, chromium, diesel fumes, nickel, radon or silica. Your care team can help you know if you have one of these risk factors.     Why does it matter if I have symptoms?  Certain symptoms can be a sign that you have a condition in your lungs that should be checked and treated by your doctor. These symptoms include fever, chest pain, a new or changing cough, shortness of breath that you have never felt before, coughing up blood or unexplained weight loss. Having any of these symptoms can greatly affect the results of lung cancer screening.       Should all smokers get an LDCT lung cancer screening exam?  It depends. Lung cancer screening is for a very specific group of men and women who have a history of heavy smoking over a long period of time (see  Who should be screened for lung cancer  above).  I am in the high-risk group, but have been diagnosed with cancer in the past. Is LDCT lung cancer screening right for me?  In some cases, you should not have LDCT lung screening, such as when your doctor is already following your cancer with CT scan studies. Your doctor will help you decide if LDCT lung screening is right for you.  Do I need to have a screening exam every year?  Yes. If you are in the high-risk group described earlier, you should get an LDCT lung cancer screening exam every year until you are 79, or are no longer willing or able to undergo screening and possible procedures to diagnose and treat lung cancer.  How  effective is LDCT at preventing death from lung cancer?  Studies have shown that LDCT lung cancer screening can lower the risk of death from lung cancer by 20 percent in people who are at high-risk.  What are the risks?  There are some risks and limitations of LDCT lung cancer screening. We want to make sure you understand the risks and benefits, so please let us know if you have any questions. Your doctor may want to talk with you more about these risks.    Radiation exposure: As with any exam that uses radiation, there is a very small increased risk of cancer. The amount of radiation in LDCT is small--about the same amount a person would get from a mammogram. Your doctor orders the exam when he or she feels the potential benefits outweigh the risks.    False negatives: No test is perfect, including LDCT. It is possible that you may have a medical condition, including lung cancer, that is not found during your exam. This is called a false negative result.    False positives and more testing: LDCT very often finds something in the lung that could be cancer, but in fact is not. This is called a false positive result. False positive tests often cause anxiety. To make sure these findings are not cancer, you may need to have more tests. These tests will be done only if you give us permission. Sometimes patients need a treatment that can have side effects, such as a biopsy. For more information on false positives, see  What can I expect from the results?     Findings not related to lung cancer: Your LDCT exam also takes pictures of areas of your body next to your lungs. In a very small number of cases, the CT scan will show an abnormal finding in one of these areas, such as your kidneys, adrenal glands, liver or thyroid. This finding may not be serious, but you may need more tests. Your doctor can help you decide what other tests you may need, if any.  What can I expect from the results?  About 1 out of 4 LDCT exams will  find something that may need more tests. Most of the time, these findings are lung nodules. Lung nodules are very small collections of tissue in the lung. These nodules are very common, and the vast majority--more than 97 percent--are not cancer (benign). Most are normal lymph nodes or small areas of scarring from past infections.  But, if a small lung nodule is found to be cancer, the cancer can be cured more than 90 percent of the time. To know if the nodule is cancer, we may need to get more images before your next yearly screening exam. If the nodule has suspicious features (for example, it is large, has an odd shape or grows over time), we will refer you to a specialist for further testing.  Will my doctor also get the results?  Yes. Your doctor will get a copy of your results.  Is it okay to keep smoking now that there s a cancer screening exam?  No. Tobacco is one of the strongest cancer-causing agents. It causes not only lung cancer, but other cancers and cardiovascular (heart) diseases as well. The damage caused by smoking builds over time. This means that the longer you smoke, the higher your risk of disease. While it is never too late to quit, the sooner you quit, the better.  Where can I find help to quit smoking?  The best way to prevent lung cancer is to stop smoking. If you have already quit smoking, congratulations and keep it up! For help on quitting smoking, please call [employer] at 2-139-214-SGMY (0787) or the American Cancer Society at 1-162.175.3671 to find local resources near you.  One-on-one health coaching:  If you d prefer to work individually with a health care provider on tobacco cessation, we offer:      Medication Therapy Management:  Our specially trained pharmacists work closely with you and your doctor to help you quit smoking.  Call [phone] or [phone] (toll free).     Can Do: Health coaching offered by [facility].  [email or web address]

## 2018-08-22 NOTE — MR AVS SNAPSHOT
After Visit Summary   8/22/2018    Amberly Palafox    MRN: [id number]           Patient Information     Date Of Birth          1945        Visit Information        Provider Department      8/22/2018 10:20 AM Luz Lopez MD [facility]        Today's Diagnoses     Medicare annual wellness visit, subsequent    -  1    Chronic obstructive pulmonary disease, unspecified COPD type (H)        Mixed hyperlipidemia        Thyroid function test abnormal        Vitamin D deficiency        Visit for screening mammogram        Acute bronchitis, unspecified organism          Care Instructions      Preventive Health Recommendations    Female Ages 65 +    Yearly exam:     See your health care provider every year in order to  o Review health changes.   o Discuss preventive care.    o Review your medicines if your doctor has prescribed any.      You no longer need a yearly Pap test unless you've had an abnormal Pap test in the past 10 years. If you have vaginal symptoms, such as bleeding or discharge, be sure to talk with your provider about a Pap test.      Every 1 to 2 years, have a mammogram.  If you are over 69, talk with your health care provider about whether or not you want to continue having screening mammograms.      Every 10 years, have a colonoscopy. Or, have a yearly FIT test (stool test). These exams will check for colon cancer.       Have a cholesterol test every 5 years, or more often if your doctor advises it.       Have a diabetes test (fasting glucose) every three years. If you are at risk for diabetes, you should have this test more often.       At age 65, have a bone density scan (DEXA) to check for osteoporosis (brittle bone disease).    Shots:    Get a flu shot each year.    Get a tetanus shot every 10 years.    Talk to your doctor about your pneumonia vaccines. There are now two you should receive - Pneumovax (PPSV 23) and Prevnar (PCV 13).    Talk to your  pharmacist about the shingles vaccine.    Talk to your doctor about the hepatitis B vaccine.    Nutrition:     Eat at least 5 servings of fruits and vegetables each day.      Eat whole-grain bread, whole-wheat pasta and brown rice instead of white grains and rice.      Get adequate Calcium and Vitamin D.     Lifestyle    Exercise at least 150 minutes a week (30 minutes a day, 5 days a week). This will help you control your weight and prevent disease.      Limit alcohol to one drink per day.      No smoking.       Wear sunscreen to prevent skin cancer.       See your dentist twice a year for an exam and cleaning.      See your eye doctor every 1 to 2 years to screen for conditions such as glaucoma, macular degeneration and cataracts.    ------------------  1. Labs today: cholesterol, thyroid function, vitamin D  2. Will send refills on simvastatin once labs are back, sent spiriva now  3. Tessalon pearls[facility]  4. Recommend Shingrix vaccine - have at pharmacy  - 2nd dose 2-6 months after the first  5. Schedule mammogram - [phone]          Follow-ups after your visit        Follow-up notes from your care team     Return in about 1 year (around 8/22/2019).      Future tests that were ordered for you today     Open Future Orders        Priority Expected Expires Ordered    *MA Screening Digital Bilateral Routine  8/22/2019 8/22/2018            Who to contact     If you have questions or need follow up information about today's clinic visit or your schedule please contact [facility] directly at [phone].  Normal or non-critical lab and imaging results will be communicated to you by MyChart, letter or phone within 4 business days after the clinic has received the results. If you do not hear from us within 7 days, please contact the clinic through MyChart or phone. If you have a critical or abnormal lab result, we will notify you by phone as soon as possible.  Submit refill requests  "through [email or web address] or call your pharmacy and they will forward the refill request to us. Please allow 3 business days for your refill to be completed.          Additional Information About Your Visit        AppointmentCityhart Information     [email or web address] gives you secure access to your electronic health record. If you see a primary care provider, you can also send messages to your care team and make appointments. If you have questions, please call your primary care clinic.  If you do not have a primary care provider, please call [phone] and they will assist you.        Care EveryWhere ID     This is your Care EveryWhere ID. This could be used by other organizations to access your [facility] medical records  [id number]        Your Vitals Were     Pulse Temperature Height Pulse Oximetry BMI (Body Mass Index)       92 98.7  F (37.1  C) (Tympanic) 5' 2\" (1.575 m) 98% 22.46 kg/m2        Blood Pressure from Last 3 Encounters:   08/22/18 126/70   06/06/18 160/72   07/26/17 130/68    Weight from Last 3 Encounters:   08/22/18 122 lb 12.8 oz (55.7 kg)   06/06/18 130 lb 11.2 oz (59.3 kg)   07/26/17 122 lb (55.3 kg)              We Performed the Following     Lipid panel reflex to direct LDL Fasting     T4, free     TSH     Vitamin D Deficiency          Today's Medication Changes          These changes are accurate as of 8/22/18 11:35 AM.  If you have any questions, ask your nurse or doctor.               Start taking these medicines.        Dose/Directions    azithromycin 250 MG tablet   Commonly known as:  ZITHROMAX   Used for:  Acute bronchitis, unspecified organism   Started by:  Luz Lopez MD        Two tablets first day, then one tablet daily for four days.   Quantity:  6 tablet   Refills:  0       benzonatate 200 MG capsule   Commonly known as:  TESSALON   Used for:  Acute bronchitis, unspecified organism   Started by:  Luz Lopez MD        Dose:  200 mg   Take 1 capsule (200 mg) by mouth 3 times daily as " needed for cough   Quantity:  21 capsule   Refills:  0         These medicines have changed or have updated prescriptions.        Dose/Directions    tiotropium 2.5 MCG/ACT inhalation aerosol   Commonly known as:  SPIRIVA RESPIMAT   This may have changed:  additional instructions   Used for:  Chronic obstructive pulmonary disease, unspecified COPD type (H)   Changed by:  Luz Lopez MD        Dose:  2 puff   Inhale 2 puffs into the lungs daily Please dispense 3 months supply   Quantity:  12 g   Refills:  3            Where to get your medicines      These medications were sent to [facility]  [place], [place] - [address]  [address] [address]     Phone:  [phone]     tiotropium 2.5 MCG/ACT inhalation aerosol         These medications were sent to [facility] [place] - [address]  [address], [place] [address]     Phone:  [phone]     azithromycin 250 MG tablet    benzonatate 200 MG capsule                Primary Care Provider Office Phone # Fax #    Luz Lopez [phone]       [facility] [address] [place] [address]        Equal Access to Services     [facility]: Hadii mathew ku hadasho Soomaali, waaxda luqadaha, qaybta kaalmada adedenisayayecenia, juwan miller . So [facility] [phone].    ATENCIÓN: Si habla español, tiene a back disposición servicios gratuitos de asistencia lingüística. [facility] [phone].    We comply with applicable federal civil rights laws and [place] laws. We do not discriminate on the basis of race, color, national origin, age, disability, sex, sexual orientation, or gender identity.            Thank you!     Thank you for choosing [facility]  for your care. Our goal is always to provide you with excellent care. Hearing back from our patients is one way we can continue to improve our services. Please take a few minutes to  complete the written survey that you may receive in the mail after your visit with us. Thank you!             Your Updated Medication List - Protect others around you: Learn how to safely use, store and throw away your medicines at [email or web address] [employer] [facility].org.          This list is accurate as of 8/22/18 11:35 AM.  Always use your most recent med list.                   Brand Name Dispense Instructions for use Diagnosis    albuterol 108 (90 Base) MCG/ACT inhaler    PROAIR HFA/PROVENTIL HFA/VENTOLIN HFA    3 Inhaler    Inhale 2 puffs into the lungs every 4 hours as needed for shortness of breath / dyspnea    COPD (chronic obstructive pulmonary disease) (H)       atorvastatin 20 MG tablet    LIPITOR    90 tablet    Take 1 tablet (20 mg) by mouth daily    Mixed hyperlipidemia       azithromycin 250 MG tablet    ZITHROMAX    6 tablet    Two tablets first day, then one tablet daily for four days.    Acute bronchitis, unspecified organism       benzonatate 200 MG capsule    TESSALON    21 capsule    Take 1 capsule (200 mg) by mouth 3 times daily as needed for cough    Acute bronchitis, unspecified organism       cholecalciferol 5000 units Caps capsule    vitamin D3     Take by mouth daily        FISH OIL           levothyroxine 175 MCG tablet    SYNTHROID/LEVOTHROID    90 tablet    TAKE ONE TABLET BY MOUTH ONCE DAILY    Hypothyroidism due to Hashimoto's thyroiditis       MULTIVITAMIN PO      Take  by mouth.        SUPER B COMPLEX PO      Take 1 tablet by mouth daily        tiotropium 2.5 MCG/ACT inhalation aerosol    SPIRIVA RESPIMAT    12 g    Inhale 2 puffs into the lungs daily Please dispense 3 months supply    Chronic obstructive pulmonary disease, unspecified COPD type (H)

## 2018-08-22 NOTE — PROGRESS NOTES
SUBJECTIVE:   Amberly Palafox is a 73 year old female who presents for Preventive Visit.  Are you in the first 12 months of your Medicare coverage?  No    Physical   Annual:     Getting at least 3 servings of Calcium per day:  Yes    Bi-annual eye exam:  Yes    Dental care twice a year:  NO    Sleep apnea or symptoms of sleep apnea:  None    Diet:  Other    Frequency of exercise:  6-7 days/week    Duration of exercise:  15-30 minutes    Taking medications regularly:  Yes    Medication side effects:  None    Additional concerns today:  YES    Ability to successfully perform activities of daily living: no assistance needed    Home Safety:  No safety concerns identified    Hearing Impairment: no hearing concerns    Fall risk:  Fallen 2 or more times in the past year?: No  Any fall with injury in the past year?: No    COGNITIVE SCREEN  1) Repeat 3 items (Leader, Season, Table)    2) Clock draw: NORMAL  3) 3 item recall: Recalls 3 objects  Results: 3 items recalled: COGNITIVE IMPAIRMENT LESS LIKELY    Mini-CogTM Copyright S Santiago. Licensed by the author for use in [facility]; reprinted with permission ([email or web address]). All rights reserved.      Sinus symptoms: Symptoms started on Saturday. Fatigue, rhinorrhea, sore throat and cough. Cough productive. Fevers Monday night to 100. Had not taken prior to that. Having incontinence with coughing. Was exposed to co-worker with bronchitis.     Reviewed and updated as needed this visit by clinical staff  Tobacco  Allergies  Meds  Problems  Med Hx  Surg Hx  Fam Hx  Soc Hx        Reviewed and updated as needed this visit by Provider  Allergies  Meds  Problems        Social History   Substance Use Topics     Smoking status: Current Every Day Smoker     Packs/day: 0.50     Years: 50.00     Types: Cigarettes     Smokeless tobacco: Never Used     Alcohol use No       Alcohol Use 8/22/2018   If you drink alcohol do you typically have greater than 3 drinks per  day OR greater than 7 drinks per week? No   No flowsheet data found.    Hyperlipidemia Follow-Up      Rate your low fat/cholesterol diet?: good    Taking statin?  Yes, possible muscle aches from statin    Other lipid medications/supplements?:  None    CAD: had high calcium score. Stress test negative. Daughter doesn't understand how one can be normal and the other so far off. Started on aspirin and statin. Tolerating ok. Has not quit smoking.    COPD Follow-Up    Symptoms are currently: slightly worsened    Current fatigue or dyspnea with ambulation: worsened from baseline    Shortness of breath: slightly worsened    Increased or change in Cough/Sputum: No    Fever(s): No    Baseline ambulation without stopping to rest:  4 blocks. Able to walk up 3 flights of stairs without stopping to rest.    Any ER/UC or hospital admissions since your last visit? No     History   Smoking Status     Current Every Day Smoker     Packs/day: 0.50     Years: 50.00     Types: Cigarettes   Smokeless Tobacco     Never Used     No results found for: FEV1, VAP2PQC  Hypothyroidism Follow-up      Since last visit, patient describes the following symptoms: weight loss of 9 lbs and hair loss, fatigue    Today's PHQ-2 Score:   PHQ-2 ( 1999 Pfizer) 8/22/2018   Q1: Little interest or pleasure in doing things 0   Q2: Feeling down, depressed or hopeless 0   PHQ-2 Score 0   Q1: Little interest or pleasure in doing things Not at all   Q2: Feeling down, depressed or hopeless Not at all   PHQ-2 Score 0     Do you feel safe in your environment - Yes    Do you have a Health Care Directive?: No: Advance care planning reviewed with patient; information given to patient to review.    Current providers sharing in care for this patient include:   Patient Care Team:  Luz Lopez MD as PCP - General (Internal Medicine)    The following health maintenance items are reviewed in Epic and correct as of today:  Health Maintenance   Topic Date Due      "COPD ACTION PLAN Q1 YR  1945     ADVANCE DIRECTIVE PLANNING Q5 YRS  03/16/2002     FALL RISK ASSESSMENT  11/16/2017     INFLUENZA VACCINE (1) 09/01/2018     MAMMO SCREEN Q2 YR (SYSTEM ASSIGNED)  07/11/2019     TSH Q1 YEAR  08/22/2019     LIPID MONITORING Q1 YEAR  08/22/2019     PHQ-2 Q1 YR  08/22/2019     LUNG CANCER SCREENING ANNUAL  08/22/2019     DEXA Q3 YR  07/11/2020     COLON CANCER SCREEN (SYSTEM ASSIGNED)  11/01/2021     TETANUS IMMUNIZATION (SYSTEM ASSIGNED)  09/10/2024     SPIROMETRY ONETIME  Completed     PNEUMOCOCCAL  Completed     HEPATITIS C SCREENING  Completed     Patient Active Problem List   Diagnosis     Osteopenia     Hypothyroidism     Mixed hyperlipidemia     COPD (chronic obstructive pulmonary disease) (H)     Tobacco abuse     Past Surgical History:   Procedure Laterality Date     no surgeries         Social History   Substance Use Topics     Smoking status: Current Every Day Smoker     Packs/day: 0.50     Years: 50.00     Types: Cigarettes     Smokeless tobacco: Never Used     Alcohol use No     Family History   Problem Relation Age of Onset     Thyroid Disease Mother      hypothyroid     HEART DISEASE Mother      s/p stents     Hypertension Mother      Thyroid Disease Daughter      hypothyroid     HEART DISEASE Maternal Grandmother 94     Lupus Sister            Review of Systems  Constitutional, HEENT, cardiovascular, pulmonary, GI, , musculoskeletal, neuro, skin, endocrine and psych systems are negative, except as otherwise noted.    OBJECTIVE:   /70 (BP Location: Right arm, Patient Position: Sitting, Cuff Size: Adult Regular)  Pulse 92  Temp 98.7  F (37.1  C) (Tympanic)  Ht 5' 2\" (1.575 m)  Wt 122 lb 12.8 oz (55.7 kg)  SpO2 98%  BMI 22.46 kg/m2 Estimated body mass index is 22.46 kg/(m^2) as calculated from the following:    Height as of this encounter: 5' 2\" (1.575 m).    Weight as of this encounter: 122 lb 12.8 oz (55.7 kg).  Physical Exam  GENERAL: tired appearing, " alert and no distress  EYES: Eyes grossly normal to inspection, PERRL and conjunctivae and sclerae normal  HENT: normal cephalic/atraumatic, ear canals and TM's normal, rhinorrhea clear, and clear PND  NECK: no adenopathy, no asymmetry, masses, or scars and thyroid normal to palpation  RESP: good air movement, scattered end expiratory wheezing  BREAST: deferred  CV: regular rate and rhythm, normal S1 S2, no S3 or S4, no murmur, click or rub, no peripheral edema and peripheral pulses strong  ABDOMEN: soft, nontender, no hepatosplenomegaly, no masses and bowel sounds normal  MS: no gross musculoskeletal defects noted, no edema  SKIN: no suspicious lesions or rashes  NEURO: Normal strength and tone, mentation intact and speech normal  PSYCH: mentation appears normal, affect normal/bright    Diagnostic Test Results:  Results for orders placed or performed in visit on 08/22/18   Lipid panel reflex to direct LDL Fasting   Result Value Ref Range    Cholesterol 144 <200 mg/dL    Triglycerides 100 <150 mg/dL    HDL Cholesterol 40 (L) >49 mg/dL    LDL Cholesterol Calculated 84 <100 mg/dL    Non HDL Cholesterol 104 <130 mg/dL   TSH   Result Value Ref Range    TSH 0.96 0.40 - 4.00 mU/L   T4, free   Result Value Ref Range    T4 Free 1.51 (H) 0.76 - 1.46 ng/dL   Vitamin D Deficiency   Result Value Ref Range    Vitamin D Deficiency screening 55 20 - 75 ug/L       ASSESSMENT / PLAN:   1. Medicare annual wellness visit, subsequent  Will schedule mammo  colonoscopy, tdap UTD  Discussed Shingrix vaccine    2. Chronic obstructive pulmonary disease with acute exacerbation (H)  3. Acute bronchitis, unspecified organism  - tiotropium (SPIRIVA RESPIMAT) 2.5 MCG/ACT inhalation aerosol; Inhale 2 puffs into the lungs daily Please dispense 3 months supply  Dispense: 12 g; Refill: 3  - benzonatate (TESSALON) 200 MG capsule; Take 1 capsule (200 mg) by mouth 3 times daily as needed for cough  Dispense: 21 capsule; Refill: 0  - azithromycin  "(ZITHROMAX) 250 MG tablet; Two tablets first day, then one tablet daily for four days.  Dispense: 6 tablet; Refill: 0    4. Mixed hyperlipidemia  Much improved. Continue atorvastatin.  - Lipid panel reflex to direct LDL Fasting    5. Hypothyroidism, unspecified type  TSH normal. Free T4 slightly off. Recommend continue levothyroxine at 175 mcg a day and recheck in 6 months.  - TSH  - T4, free  - TSH; Future  - T4, free; Future    6. Vitamin D deficiency  Controlled  - Vitamin D Deficiency    7. Visit for screening mammogram  - *MA Screening Digital Bilateral; Future    8. Personal history of tobacco use  Due for annual lung cancer screening CT  - Prof fee: Shared Decisionmaking for Lung Cancer Screening  - CT Chest Lung Cancer Scrn Low Dose wo; Future    End of Life Planning:  Patient currently has an advanced directive: No.  I have verified the patient's ablity to prepare an advanced directive/make health care decisions.  Literature was provided to assist patient in preparing an advanced directive.    COUNSELING:  Reviewed preventive health counseling, as reflected in patient instructions  Special attention given to:       Regular exercise       Healthy diet/nutrition       Consider lung cancer screening for ages 55-80 years and 30 pack-year smoking history        Colon cancer screening    BP Readings from Last 1 Encounters:   08/22/18 126/70     Estimated body mass index is 22.46 kg/(m^2) as calculated from the following:    Height as of this encounter: 5' 2\" (1.575 m).    Weight as of this encounter: 122 lb 12.8 oz (55.7 kg).    BP Screening:   Last 3 BP Readings:    BP Readings from Last 3 Encounters:   08/22/18 126/70   06/06/18 160/72   07/26/17 130/68       The following was recommended to the patient:  Re-screen BP within a year and recommended lifestyle modifications       reports that she has been smoking Cigarettes.  She has a 25.00 pack-year smoking history. She has never used smokeless tobacco.  Tobacco " Cessation Action Plan: Information offered: Patient not interested at this time    Appropriate preventive services were discussed with this patient, including applicable screening as appropriate for cardiovascular disease, diabetes, osteopenia/osteoporosis, and glaucoma.  As appropriate for age/gender, discussed screening for colorectal cancer, prostate cancer, breast cancer, and cervical cancer. Checklist reviewing preventive services available has been given to the patient.    Reviewed patients plan of care and provided an AVS. The Basic Care Plan (routine screening as documented in Health Maintenance) for Amberly meets the Care Plan requirement. This Care Plan has been established and reviewed with the Patient.    Counseling Resources:  ATP IV Guidelines  Pooled Cohorts Equation Calculator  Breast Cancer Risk Calculator  FRAX Risk Assessment  ICSI Preventive Guidelines  Dietary Guidelines for Americans, 2010  [employer]'s MyPlate  ASA Prophylaxis  Lung CA Screening    Luz Lopez MD  [facility]    Lung Cancer Screening Shared Decision Making Visit     Amberly Palafox is eligible for lung cancer screening on the basis of the information provided in my signed lung cancer screening order.     I have discussed with patient the risks and benefits of screening for lung cancer with low-dose CT.     The risks include:  radiation exposure: one low dose chest CT has as much ionizing radiation as about 15 chest x-rays or 6 months of background radiation living in [place]    false positives: 96% of positive findings/nodules are NOT cancer, but some might still require additional diagnostic evaluation, including biopsy  over-diagnosis: some slow growing cancers that might never have been clinically significant will be detected and treated unnecessarily     The benefit of early detection of lung cancer is contingent upon adherence to annual screening or more frequent follow up if indicated.      Furthermore, reaping the benefits of screening requires Amberly Palafox to be willing and physically able to undergo diagnostic procedures, if indicated. Although no specific guide is available for determining severity of comorbidities, it is reasonable to withhold screening in patients who have greater mortality risk from other diseases.     We did discuss that the only way to prevent lung cancer is to not smoke. Smoking cessation assistance was offered.    I did not offer risk estimation using a calculator such as this one: Looked at risk calculator last year.Answers for HPI/ROS submitted by the patient on 8/22/2018   PHQ-2 Score: 0      ShouldIScreen      Luz Lopez MD  Internal Medicine/Pediatrics

## 2018-08-23 LAB
CHOLEST SERPL-MCNC: 144 MG/DL
DEPRECATED CALCIDIOL+CALCIFEROL SERPL-MC: 55 UG/L (ref 20–75)
HDLC SERPL-MCNC: 40 MG/DL
LDLC SERPL CALC-MCNC: 84 MG/DL
NONHDLC SERPL-MCNC: 104 MG/DL
T4 FREE SERPL-MCNC: 1.51 NG/DL (ref 0.76–1.46)
TRIGL SERPL-MCNC: 100 MG/DL
TSH SERPL DL<=0.005 MIU/L-ACNC: 0.96 MU/L (ref 0.4–4)

## 2018-08-23 RX ORDER — ATORVASTATIN CALCIUM 20 MG/1
20 TABLET, FILM COATED ORAL DAILY
Qty: 90 TABLET | Refills: 3 | Status: SHIPPED | OUTPATIENT
Start: 2018-08-23 | End: 2019-01-01

## 2018-09-25 ENCOUNTER — HOSPITAL ENCOUNTER (OUTPATIENT)
Dept: CT IMAGING | Facility: CLINIC | Age: 73
Discharge: HOME | End: 2018-09-25
Attending: INTERNAL MEDICINE | Admitting: INTERNAL MEDICINE
Payer: COMMERCIAL

## 2018-09-25 ENCOUNTER — TELEPHONE (OUTPATIENT)
Dept: PEDIATRICS | Facility: CLINIC | Age: 73
End: 2018-09-25

## 2018-09-25 DIAGNOSIS — Z87.891 PERSONAL HISTORY OF TOBACCO USE: ICD-10-CM

## 2018-09-25 DIAGNOSIS — R91.1 LUNG NODULE: ICD-10-CM

## 2018-09-25 PROCEDURE — G0297 LDCT FOR LUNG CA SCREEN: HCPCS

## 2018-09-25 NOTE — TELEPHONE ENCOUNTER
[place] Lung Cancer Screening CT result notification:     LDCT/Lung Cancer Screening CT Exam date: 9/25/18  Radiologist Impression AND Recommendations:   ACR Assessment Category:  Lung-RADS Category 4B. Suspicious.  Recommendation:  Chest CT with contrast  (please use exam code ) or without contrast (please use exam code ), PET/CT and/or tissue sampling depending on the probability of malignancy and comorbidities, PET/CT may be used when there is a >/= 8 mm solid component. .    Pertinent Findings:  Lungs: There is a new posterior left upper lobe lung mass measuring 3.5 x 2.9 cm which measures slightly greater than water density. A necrotic mass or complex fluid collection remain in the differential.  There appears to be a small focus of adjacent airway dilatation  possibly reflecting an endobronchial mass or mucoid impaction. Subtle right major fissure areas of nodularity described previously are stable possibly tiny fissural lymph nodes. No infiltrate, consolidation or other mass is appreciated     Ordering Provider: Luz Lopez MD Constance J Woolley did not receive the remaining radiology results from her provider.   RN communicated the lung nodule finding to the patient (Yes/No):  No, Left voicemail message requesting a call back to [phone] between 10 a.m. and 6:30 p.m. to discuss radiology finding.  RN ordered Lung nodule program referral (Yes/NA): no  The patient had the following questions: NA, left message  Correct letter sent as per Lung nodule protocol (Yes/No):  Yes, sent by imaging dept  Miscellaneous information:  NA    IF scheduling Lung Nodule Clinic visit, warm transfer to clinic Patient would like to be seen at:    [place] (Hrs M-F 7AM to 5PM):  Ph #: see OneNote   Address:  [facility], [address], [place].  2nd floor    [facility] (Hrs M - F 7AM - 7PM;  Sat 8A to noon):                Ph # ;  [phone]    [facility](Cancer Clinic): [phone]  If RADS 4A, 4B, 4X, and inc nodule >1 cm,  RN routed telephone encounter to CNS team (Yes/No): Yes  For RADS 4B and 4X, RN will inform the patient that a team of specialist will be discussing their case and contacting them with the next steps within 24 to 48 hours.  If they do not hear back within 48 hours, call their clinic (Lung Nodule clinic) at [phone], press option 2 to speak with the nurse (Yes/NA): NO, LEFT MESSAGE    [facility] RN  Lung Nodule and ED Lab Result F/u RN  Ph# [phone]

## 2018-09-26 ENCOUNTER — RADIANT APPOINTMENT (OUTPATIENT)
Dept: MAMMOGRAPHY | Facility: CLINIC | Age: 73
End: 2018-09-26
Attending: INTERNAL MEDICINE
Payer: COMMERCIAL

## 2018-09-26 DIAGNOSIS — Z12.31 VISIT FOR SCREENING MAMMOGRAM: ICD-10-CM

## 2018-09-26 PROCEDURE — 77067 SCR MAMMO BI INCL CAD: CPT | Mod: TC

## 2018-09-26 PROCEDURE — 77063 BREAST TOMOSYNTHESIS BI: CPT | Mod: TC

## 2018-09-26 NOTE — TELEPHONE ENCOUNTER
[place] Lung Cancer Screening CT result notification:      Reason for call: Nodule follow up  Notify of CT result and that her CT will be reviewed by a team of specialist this Friday.  She will be getting a call on Friday    Left voicemail message requesting a call back to [phone] between 10 a.m. and 6:30 p.m. to discuss radiology finding.    Florentino Estevez RN  [facility] RN  Lung Nodule and ED Lab Result RN  Epic pool (ED late result f/u RN): [id number]  [facility] INCIDENT[facility]IOLOGY F/U NURSES: [id number]  Ph# [phone]

## 2018-09-28 DIAGNOSIS — R91.8 MASS OF LEFT LUNG: Primary | ICD-10-CM

## 2018-10-01 ENCOUNTER — TELEPHONE (OUTPATIENT)
Dept: SURGERY | Facility: CLINIC | Age: 73
End: 2018-10-01

## 2018-10-01 NOTE — TELEPHONE ENCOUNTER
I spoke to Amberly today about findings and recommendations after her recent Lung Cancer Screening Chest CT was done.  We discussed her case at our weekly Lung Nodule Conference on Friday.   It was recommended she get PET scan as she had enlarged axillary lymph nodes as well as a 3.5 cm mass in left upper lung.    She reports being sick with presumed bronchitis recently and is still coughing.  She said she was on an antibiotic a few weeks ago.   She would prefer to discuss this with Dr. Lopez first before scheduling PET scan.    I also told her about a lung nodule clinic at [facility] (Dr. Sandy Grimes) for further surveillance, if interested.  Message sent to Dr. Lopez about above conversation/recommendations.

## 2018-12-11 ENCOUNTER — HOSPITAL ENCOUNTER (OUTPATIENT)
Dept: PET IMAGING | Facility: CLINIC | Age: 73
Discharge: HOME | End: 2018-12-11
Attending: CLINICAL NURSE SPECIALIST | Admitting: CLINICAL NURSE SPECIALIST
Payer: COMMERCIAL

## 2018-12-11 DIAGNOSIS — R91.8 MASS OF LEFT LUNG: ICD-10-CM

## 2018-12-11 LAB
CREAT BLD-MCNC: 0.6 MG/DL (ref 0.52–1.04)
GFR SERPL CREATININE-BSD FRML MDRD: >90 ML/MIN/1.7M2

## 2018-12-11 PROCEDURE — A9552 F18 FDG: HCPCS

## 2018-12-11 PROCEDURE — 82565 ASSAY OF CREATININE: CPT

## 2018-12-11 PROCEDURE — 25000128 H RX IP 250 OP 636

## 2018-12-11 PROCEDURE — 34300033 ZZH RX 343

## 2018-12-11 PROCEDURE — 78815 PET IMAGE W/CT SKULL-THIGH: CPT | Mod: PI

## 2018-12-11 RX ORDER — IOPAMIDOL 755 MG/ML
20-135 INJECTION, SOLUTION INTRAVASCULAR ONCE
Status: COMPLETED | OUTPATIENT
Start: 2018-12-11 | End: 2018-12-11

## 2018-12-11 RX ADMIN — FLUDEOXYGLUCOSE F-18 10.41 MCI.: 500 INJECTION, SOLUTION INTRAVENOUS at 09:18

## 2018-12-11 RX ADMIN — IOPAMIDOL 100 ML: 755 INJECTION, SOLUTION INTRAVENOUS at 09:17

## 2018-12-14 ENCOUNTER — TELEPHONE (OUTPATIENT)
Dept: SURGERY | Facility: CLINIC | Age: 73
End: 2018-12-14

## 2018-12-14 DIAGNOSIS — R91.1 LUNG NODULE: Primary | ICD-10-CM

## 2018-12-14 NOTE — TELEPHONE ENCOUNTER
Call to Roxana to talk about the recommendations from Nodule Conference. There was no answer. Message left with call back information.

## 2018-12-19 NOTE — TELEPHONE ENCOUNTER
Called patient to schedule procedure with Dr. Ajay Us, there was no answer.  Left message with my direct line [phone].

## 2019-01-01 ENCOUNTER — INFUSION THERAPY VISIT (OUTPATIENT)
Dept: ONCOLOGY | Facility: CLINIC | Age: 74
End: 2019-01-01
Attending: INTERNAL MEDICINE
Payer: COMMERCIAL

## 2019-01-01 ENCOUNTER — ONCOLOGY VISIT (OUTPATIENT)
Dept: ONCOLOGY | Facility: CLINIC | Age: 74
End: 2019-01-01
Attending: NURSE PRACTITIONER
Payer: COMMERCIAL

## 2019-01-01 ENCOUNTER — APPOINTMENT (OUTPATIENT)
Dept: LAB | Facility: CLINIC | Age: 74
End: 2019-01-01
Attending: INTERNAL MEDICINE
Payer: COMMERCIAL

## 2019-01-01 ENCOUNTER — INFUSION THERAPY VISIT (OUTPATIENT)
Dept: INFUSION THERAPY | Facility: CLINIC | Age: 74
End: 2019-01-01
Attending: NURSE PRACTITIONER
Payer: COMMERCIAL

## 2019-01-01 ENCOUNTER — TELEPHONE (OUTPATIENT)
Dept: PEDIATRICS | Facility: CLINIC | Age: 74
End: 2019-01-01

## 2019-01-01 ENCOUNTER — TELEPHONE (OUTPATIENT)
Dept: ONCOLOGY | Facility: CLINIC | Age: 74
End: 2019-01-01

## 2019-01-01 ENCOUNTER — PATIENT OUTREACH (OUTPATIENT)
Dept: ONCOLOGY | Facility: CLINIC | Age: 74
End: 2019-01-01

## 2019-01-01 ENCOUNTER — DOCUMENTATION ONLY (OUTPATIENT)
Dept: OTHER | Facility: CLINIC | Age: 74
End: 2019-01-01

## 2019-01-01 ENCOUNTER — HOSPITAL ENCOUNTER (OUTPATIENT)
Dept: CT IMAGING | Facility: CLINIC | Age: 74
Discharge: HOME | End: 2019-05-29
Attending: CLINICAL NURSE SPECIALIST | Admitting: CLINICAL NURSE SPECIALIST
Payer: COMMERCIAL

## 2019-01-01 ENCOUNTER — APPOINTMENT (OUTPATIENT)
Dept: CT IMAGING | Facility: CLINIC | Age: 74
End: 2019-01-01
Attending: EMERGENCY MEDICINE
Payer: COMMERCIAL

## 2019-01-01 ENCOUNTER — APPOINTMENT (OUTPATIENT)
Dept: LAB | Facility: CLINIC | Age: 74
End: 2019-01-01
Attending: NURSE PRACTITIONER
Payer: COMMERCIAL

## 2019-01-01 ENCOUNTER — ANESTHESIA EVENT (OUTPATIENT)
Dept: SURGERY | Facility: CLINIC | Age: 74
End: 2019-01-01
Payer: COMMERCIAL

## 2019-01-01 ENCOUNTER — HOSPITAL ENCOUNTER (OUTPATIENT)
Dept: PET IMAGING | Facility: CLINIC | Age: 74
Discharge: HOME | End: 2019-06-17
Attending: CLINICAL NURSE SPECIALIST | Admitting: CLINICAL NURSE SPECIALIST
Payer: COMMERCIAL

## 2019-01-01 ENCOUNTER — PRE VISIT (OUTPATIENT)
Dept: ONCOLOGY | Facility: CLINIC | Age: 74
End: 2019-01-01

## 2019-01-01 ENCOUNTER — HOSPITAL ENCOUNTER (OUTPATIENT)
Facility: CLINIC | Age: 74
Setting detail: SPECIMEN
Discharge: HOME | End: 2019-07-08
Attending: NURSE PRACTITIONER | Admitting: NURSE PRACTITIONER
Payer: COMMERCIAL

## 2019-01-01 ENCOUNTER — HOSPITAL ENCOUNTER (EMERGENCY)
Facility: CLINIC | Age: 74
Discharge: HOME | End: 2019-07-01
Payer: COMMERCIAL

## 2019-01-01 ENCOUNTER — OFFICE VISIT (OUTPATIENT)
Dept: PEDIATRICS | Facility: CLINIC | Age: 74
End: 2019-01-01
Payer: COMMERCIAL

## 2019-01-01 ENCOUNTER — ANCILLARY PROCEDURE (OUTPATIENT)
Dept: CT IMAGING | Facility: CLINIC | Age: 74
End: 2019-01-01
Attending: INTERNAL MEDICINE
Payer: COMMERCIAL

## 2019-01-01 ENCOUNTER — TELEPHONE (OUTPATIENT)
Dept: PULMONOLOGY | Facility: CLINIC | Age: 74
End: 2019-01-01

## 2019-01-01 ENCOUNTER — HEALTH MAINTENANCE LETTER (OUTPATIENT)
Age: 74
End: 2019-01-01

## 2019-01-01 ENCOUNTER — HOSPITAL ENCOUNTER (EMERGENCY)
Facility: CLINIC | Age: 74
Discharge: HOME | End: 2019-12-03
Attending: EMERGENCY MEDICINE | Admitting: EMERGENCY MEDICINE
Payer: COMMERCIAL

## 2019-01-01 ENCOUNTER — ANESTHESIA (OUTPATIENT)
Dept: SURGERY | Facility: CLINIC | Age: 74
End: 2019-01-01
Payer: COMMERCIAL

## 2019-01-01 ENCOUNTER — APPOINTMENT (OUTPATIENT)
Dept: GENERAL RADIOLOGY | Facility: CLINIC | Age: 74
End: 2019-01-01
Attending: INTERNAL MEDICINE
Payer: COMMERCIAL

## 2019-01-01 ENCOUNTER — HOSPITAL ENCOUNTER (OUTPATIENT)
Dept: MRI IMAGING | Facility: CLINIC | Age: 74
Discharge: HOME | End: 2019-06-14
Attending: CLINICAL NURSE SPECIALIST | Admitting: CLINICAL NURSE SPECIALIST
Payer: COMMERCIAL

## 2019-01-01 ENCOUNTER — HOSPITAL ENCOUNTER (OUTPATIENT)
Facility: CLINIC | Age: 74
Discharge: HOME | End: 2019-06-07
Attending: INTERNAL MEDICINE | Admitting: INTERNAL MEDICINE
Payer: COMMERCIAL

## 2019-01-01 ENCOUNTER — OFFICE VISIT (OUTPATIENT)
Dept: PALLIATIVE CARE | Facility: CLINIC | Age: 74
End: 2019-01-01
Attending: INTERNAL MEDICINE
Payer: COMMERCIAL

## 2019-01-01 ENCOUNTER — NURSE TRIAGE (OUTPATIENT)
Dept: NURSING | Facility: CLINIC | Age: 74
End: 2019-01-01

## 2019-01-01 VITALS
WEIGHT: 117.2 LBS | SYSTOLIC BLOOD PRESSURE: 129 MMHG | HEART RATE: 100 BPM | BODY MASS INDEX: 21.44 KG/M2 | DIASTOLIC BLOOD PRESSURE: 67 MMHG | TEMPERATURE: 98.5 F | OXYGEN SATURATION: 97 % | RESPIRATION RATE: 16 BRPM

## 2019-01-01 VITALS
BODY MASS INDEX: 21.35 KG/M2 | HEART RATE: 93 BPM | OXYGEN SATURATION: 98 % | RESPIRATION RATE: 18 BRPM | WEIGHT: 116 LBS | HEIGHT: 62 IN | SYSTOLIC BLOOD PRESSURE: 129 MMHG | TEMPERATURE: 98.1 F | DIASTOLIC BLOOD PRESSURE: 59 MMHG

## 2019-01-01 VITALS
OXYGEN SATURATION: 95 % | RESPIRATION RATE: 16 BRPM | SYSTOLIC BLOOD PRESSURE: 119 MMHG | TEMPERATURE: 98.6 F | HEART RATE: 97 BPM | DIASTOLIC BLOOD PRESSURE: 75 MMHG

## 2019-01-01 VITALS
DIASTOLIC BLOOD PRESSURE: 57 MMHG | RESPIRATION RATE: 16 BRPM | OXYGEN SATURATION: 96 % | SYSTOLIC BLOOD PRESSURE: 114 MMHG | HEART RATE: 83 BPM | TEMPERATURE: 98.3 F

## 2019-01-01 VITALS
DIASTOLIC BLOOD PRESSURE: 85 MMHG | OXYGEN SATURATION: 100 % | HEART RATE: 95 BPM | RESPIRATION RATE: 16 BRPM | SYSTOLIC BLOOD PRESSURE: 147 MMHG | TEMPERATURE: 98.6 F

## 2019-01-01 VITALS
SYSTOLIC BLOOD PRESSURE: 122 MMHG | TEMPERATURE: 98.6 F | OXYGEN SATURATION: 97 % | HEART RATE: 86 BPM | RESPIRATION RATE: 16 BRPM | DIASTOLIC BLOOD PRESSURE: 72 MMHG

## 2019-01-01 VITALS
RESPIRATION RATE: 16 BRPM | OXYGEN SATURATION: 98 % | SYSTOLIC BLOOD PRESSURE: 141 MMHG | TEMPERATURE: 99.4 F | BODY MASS INDEX: 21.33 KG/M2 | HEIGHT: 62 IN | DIASTOLIC BLOOD PRESSURE: 66 MMHG | HEART RATE: 92 BPM | WEIGHT: 115.9 LBS

## 2019-01-01 VITALS
OXYGEN SATURATION: 97 % | HEART RATE: 98 BPM | SYSTOLIC BLOOD PRESSURE: 147 MMHG | BODY MASS INDEX: 21.86 KG/M2 | WEIGHT: 118.8 LBS | RESPIRATION RATE: 16 BRPM | HEIGHT: 62 IN | TEMPERATURE: 98.3 F | DIASTOLIC BLOOD PRESSURE: 70 MMHG

## 2019-01-01 VITALS
DIASTOLIC BLOOD PRESSURE: 63 MMHG | HEART RATE: 75 BPM | SYSTOLIC BLOOD PRESSURE: 134 MMHG | OXYGEN SATURATION: 97 % | TEMPERATURE: 98 F

## 2019-01-01 VITALS
WEIGHT: 116 LBS | DIASTOLIC BLOOD PRESSURE: 71 MMHG | SYSTOLIC BLOOD PRESSURE: 139 MMHG | TEMPERATURE: 98.6 F | HEART RATE: 89 BPM | OXYGEN SATURATION: 99 % | RESPIRATION RATE: 16 BRPM | BODY MASS INDEX: 21.22 KG/M2

## 2019-01-01 VITALS
OXYGEN SATURATION: 98 % | WEIGHT: 117 LBS | TEMPERATURE: 97.5 F | RESPIRATION RATE: 16 BRPM | SYSTOLIC BLOOD PRESSURE: 128 MMHG | HEART RATE: 82 BPM | HEIGHT: 62 IN | BODY MASS INDEX: 21.53 KG/M2 | DIASTOLIC BLOOD PRESSURE: 80 MMHG

## 2019-01-01 VITALS
OXYGEN SATURATION: 98 % | SYSTOLIC BLOOD PRESSURE: 123 MMHG | HEIGHT: 62 IN | BODY MASS INDEX: 21.53 KG/M2 | DIASTOLIC BLOOD PRESSURE: 69 MMHG | WEIGHT: 117 LBS | RESPIRATION RATE: 14 BRPM | HEART RATE: 98 BPM | TEMPERATURE: 99.7 F

## 2019-01-01 VITALS
BODY MASS INDEX: 21.95 KG/M2 | WEIGHT: 119.27 LBS | SYSTOLIC BLOOD PRESSURE: 146 MMHG | OXYGEN SATURATION: 94 % | TEMPERATURE: 98.7 F | HEART RATE: 91 BPM | HEIGHT: 62 IN | RESPIRATION RATE: 16 BRPM | DIASTOLIC BLOOD PRESSURE: 62 MMHG

## 2019-01-01 VITALS
SYSTOLIC BLOOD PRESSURE: 145 MMHG | OXYGEN SATURATION: 93 % | TEMPERATURE: 98 F | DIASTOLIC BLOOD PRESSURE: 76 MMHG | HEIGHT: 62 IN | WEIGHT: 120.2 LBS | HEART RATE: 87 BPM | BODY MASS INDEX: 22.12 KG/M2

## 2019-01-01 VITALS
HEIGHT: 62 IN | BODY MASS INDEX: 22.26 KG/M2 | TEMPERATURE: 98.6 F | HEART RATE: 87 BPM | DIASTOLIC BLOOD PRESSURE: 66 MMHG | WEIGHT: 121 LBS | SYSTOLIC BLOOD PRESSURE: 122 MMHG | OXYGEN SATURATION: 97 %

## 2019-01-01 VITALS
OXYGEN SATURATION: 97 % | SYSTOLIC BLOOD PRESSURE: 126 MMHG | TEMPERATURE: 97.9 F | DIASTOLIC BLOOD PRESSURE: 53 MMHG | RESPIRATION RATE: 16 BRPM | HEART RATE: 80 BPM

## 2019-01-01 VITALS
HEIGHT: 62 IN | OXYGEN SATURATION: 97 % | BODY MASS INDEX: 21.77 KG/M2 | TEMPERATURE: 99.4 F | DIASTOLIC BLOOD PRESSURE: 72 MMHG | WEIGHT: 118.3 LBS | HEART RATE: 87 BPM | SYSTOLIC BLOOD PRESSURE: 146 MMHG

## 2019-01-01 VITALS
OXYGEN SATURATION: 99 % | DIASTOLIC BLOOD PRESSURE: 64 MMHG | RESPIRATION RATE: 16 BRPM | HEART RATE: 103 BPM | WEIGHT: 116.6 LBS | HEIGHT: 62 IN | SYSTOLIC BLOOD PRESSURE: 173 MMHG | BODY MASS INDEX: 21.46 KG/M2 | TEMPERATURE: 98.2 F

## 2019-01-01 VITALS
BODY MASS INDEX: 20.87 KG/M2 | HEART RATE: 87 BPM | OXYGEN SATURATION: 98 % | WEIGHT: 114.1 LBS | RESPIRATION RATE: 16 BRPM | TEMPERATURE: 97.8 F | SYSTOLIC BLOOD PRESSURE: 150 MMHG | DIASTOLIC BLOOD PRESSURE: 69 MMHG

## 2019-01-01 VITALS
DIASTOLIC BLOOD PRESSURE: 68 MMHG | RESPIRATION RATE: 18 BRPM | OXYGEN SATURATION: 100 % | TEMPERATURE: 98.5 F | SYSTOLIC BLOOD PRESSURE: 122 MMHG | HEART RATE: 88 BPM

## 2019-01-01 VITALS
WEIGHT: 116 LBS | HEART RATE: 91 BPM | RESPIRATION RATE: 18 BRPM | OXYGEN SATURATION: 93 % | SYSTOLIC BLOOD PRESSURE: 144 MMHG | DIASTOLIC BLOOD PRESSURE: 72 MMHG | TEMPERATURE: 99 F | BODY MASS INDEX: 21.22 KG/M2

## 2019-01-01 VITALS
HEART RATE: 92 BPM | TEMPERATURE: 97.7 F | DIASTOLIC BLOOD PRESSURE: 67 MMHG | SYSTOLIC BLOOD PRESSURE: 148 MMHG | WEIGHT: 117.7 LBS | RESPIRATION RATE: 16 BRPM | BODY MASS INDEX: 21.53 KG/M2 | OXYGEN SATURATION: 98 %

## 2019-01-01 VITALS
TEMPERATURE: 98.7 F | SYSTOLIC BLOOD PRESSURE: 140 MMHG | DIASTOLIC BLOOD PRESSURE: 76 MMHG | HEIGHT: 62 IN | WEIGHT: 113 LBS | BODY MASS INDEX: 20.8 KG/M2 | OXYGEN SATURATION: 99 % | HEART RATE: 95 BPM | RESPIRATION RATE: 18 BRPM

## 2019-01-01 VITALS
DIASTOLIC BLOOD PRESSURE: 75 MMHG | OXYGEN SATURATION: 100 % | TEMPERATURE: 98.4 F | SYSTOLIC BLOOD PRESSURE: 150 MMHG | RESPIRATION RATE: 16 BRPM | HEART RATE: 82 BPM

## 2019-01-01 VITALS
RESPIRATION RATE: 16 BRPM | HEIGHT: 62 IN | TEMPERATURE: 99.1 F | SYSTOLIC BLOOD PRESSURE: 144 MMHG | HEART RATE: 93 BPM | BODY MASS INDEX: 21.4 KG/M2 | OXYGEN SATURATION: 98 % | DIASTOLIC BLOOD PRESSURE: 60 MMHG

## 2019-01-01 DIAGNOSIS — T45.1X5A CHEMOTHERAPY-INDUCED NEUTROPENIA (H): ICD-10-CM

## 2019-01-01 DIAGNOSIS — C34.90 SMALL CELL LUNG CANCER (H): ICD-10-CM

## 2019-01-01 DIAGNOSIS — C34.90 SMALL CELL LUNG CANCER (H): Primary | ICD-10-CM

## 2019-01-01 DIAGNOSIS — E06.3 HYPOTHYROIDISM DUE TO HASHIMOTO'S THYROIDITIS: ICD-10-CM

## 2019-01-01 DIAGNOSIS — J44.9 CHRONIC OBSTRUCTIVE PULMONARY DISEASE, UNSPECIFIED COPD TYPE (H): ICD-10-CM

## 2019-01-01 DIAGNOSIS — Z72.0 TOBACCO ABUSE: ICD-10-CM

## 2019-01-01 DIAGNOSIS — D70.1 CHEMOTHERAPY-INDUCED NEUTROPENIA (H): ICD-10-CM

## 2019-01-01 DIAGNOSIS — T45.1X5A CHEMOTHERAPY INDUCED NAUSEA AND VOMITING: ICD-10-CM

## 2019-01-01 DIAGNOSIS — R91.1 LUNG NODULE: Primary | ICD-10-CM

## 2019-01-01 DIAGNOSIS — R05.9 COUGH: ICD-10-CM

## 2019-01-01 DIAGNOSIS — R11.2 CHEMOTHERAPY INDUCED NAUSEA AND VOMITING: ICD-10-CM

## 2019-01-01 DIAGNOSIS — C34.12 SMALL CELL LUNG CANCER, LEFT UPPER LOBE (H): ICD-10-CM

## 2019-01-01 DIAGNOSIS — R91.8 LUNG MASS: ICD-10-CM

## 2019-01-01 DIAGNOSIS — E03.9 HYPOTHYROIDISM, UNSPECIFIED TYPE: ICD-10-CM

## 2019-01-01 DIAGNOSIS — C34.92 SMALL CELL LUNG CANCER, LEFT (H): Primary | ICD-10-CM

## 2019-01-01 DIAGNOSIS — J44.1 CHRONIC OBSTRUCTIVE PULMONARY DISEASE WITH ACUTE EXACERBATION (H): ICD-10-CM

## 2019-01-01 DIAGNOSIS — E78.2 MIXED HYPERLIPIDEMIA: ICD-10-CM

## 2019-01-01 DIAGNOSIS — J44.1 CHRONIC OBSTRUCTIVE PULMONARY DISEASE WITH ACUTE EXACERBATION (H): Primary | ICD-10-CM

## 2019-01-01 DIAGNOSIS — C34.92 SMALL CELL LUNG CANCER, LEFT (H): ICD-10-CM

## 2019-01-01 DIAGNOSIS — Z01.818 PREOP GENERAL PHYSICAL EXAM: Primary | ICD-10-CM

## 2019-01-01 DIAGNOSIS — R93.1 ELEVATED CORONARY ARTERY CALCIUM SCORE: ICD-10-CM

## 2019-01-01 DIAGNOSIS — R91.1 LUNG NODULE: ICD-10-CM

## 2019-01-01 DIAGNOSIS — E06.3 HYPOTHYROIDISM DUE TO HASHIMOTO'S THYROIDITIS: Primary | ICD-10-CM

## 2019-01-01 DIAGNOSIS — R53.0 NEOPLASTIC MALIGNANT RELATED FATIGUE: ICD-10-CM

## 2019-01-01 DIAGNOSIS — R25.2 LEG CRAMPS: ICD-10-CM

## 2019-01-01 DIAGNOSIS — Z00.00 ROUTINE HISTORY AND PHYSICAL EXAMINATION OF ADULT: Primary | ICD-10-CM

## 2019-01-01 DIAGNOSIS — Z13.1 SCREENING FOR DIABETES MELLITUS: ICD-10-CM

## 2019-01-01 DIAGNOSIS — E55.9 VITAMIN D DEFICIENCY: ICD-10-CM

## 2019-01-01 DIAGNOSIS — M79.10 MYALGIA: ICD-10-CM

## 2019-01-01 LAB
ALBUMIN SERPL-MCNC: 3.4 G/DL (ref 3.4–5)
ALBUMIN SERPL-MCNC: 3.4 G/DL (ref 3.4–5)
ALBUMIN SERPL-MCNC: 3.5 G/DL (ref 3.4–5)
ALBUMIN SERPL-MCNC: 3.5 G/DL (ref 3.4–5)
ALBUMIN SERPL-MCNC: 3.6 G/DL (ref 3.4–5)
ALBUMIN SERPL-MCNC: 3.7 G/DL (ref 3.4–5)
ALBUMIN SERPL-MCNC: 3.9 G/DL (ref 3.4–5)
ALBUMIN SERPL-MCNC: 4 G/DL (ref 3.4–5)
ALP SERPL-CCNC: 67 U/L (ref 40–150)
ALP SERPL-CCNC: 74 U/L (ref 40–150)
ALP SERPL-CCNC: 75 U/L (ref 40–150)
ALP SERPL-CCNC: 75 U/L (ref 40–150)
ALP SERPL-CCNC: 76 U/L (ref 40–150)
ALP SERPL-CCNC: 83 U/L (ref 40–150)
ALP SERPL-CCNC: 89 U/L (ref 40–150)
ALP SERPL-CCNC: 90 U/L (ref 40–150)
ALP SERPL-CCNC: 92 U/L (ref 40–150)
ALP SERPL-CCNC: 94 U/L (ref 40–150)
ALT SERPL W P-5'-P-CCNC: 15 U/L (ref 0–50)
ALT SERPL W P-5'-P-CCNC: 15 U/L (ref 0–50)
ALT SERPL W P-5'-P-CCNC: 16 U/L (ref 0–50)
ALT SERPL W P-5'-P-CCNC: 16 U/L (ref 0–50)
ALT SERPL W P-5'-P-CCNC: 17 U/L (ref 0–50)
ALT SERPL W P-5'-P-CCNC: 17 U/L (ref 0–50)
ALT SERPL W P-5'-P-CCNC: 18 U/L (ref 0–50)
ALT SERPL W P-5'-P-CCNC: 19 U/L (ref 0–50)
ALT SERPL W P-5'-P-CCNC: 19 U/L (ref 0–50)
ALT SERPL W P-5'-P-CCNC: 22 U/L (ref 0–50)
ANION GAP SERPL CALCULATED.3IONS-SCNC: 4 MMOL/L (ref 3–14)
ANION GAP SERPL CALCULATED.3IONS-SCNC: 5 MMOL/L (ref 3–14)
ANION GAP SERPL CALCULATED.3IONS-SCNC: 6 MMOL/L (ref 3–14)
ANION GAP SERPL CALCULATED.3IONS-SCNC: 6 MMOL/L (ref 3–14)
ANION GAP SERPL CALCULATED.3IONS-SCNC: 7 MMOL/L (ref 3–14)
AST SERPL W P-5'-P-CCNC: 12 U/L (ref 0–45)
AST SERPL W P-5'-P-CCNC: 13 U/L (ref 0–45)
AST SERPL W P-5'-P-CCNC: 13 U/L (ref 0–45)
AST SERPL W P-5'-P-CCNC: 14 U/L (ref 0–45)
AST SERPL W P-5'-P-CCNC: 14 U/L (ref 0–45)
AST SERPL W P-5'-P-CCNC: 15 U/L (ref 0–45)
AST SERPL W P-5'-P-CCNC: 15 U/L (ref 0–45)
AST SERPL W P-5'-P-CCNC: 17 U/L (ref 0–45)
AST SERPL W P-5'-P-CCNC: 18 U/L (ref 0–45)
AST SERPL W P-5'-P-CCNC: 21 U/L (ref 0–45)
BASOPHILS # BLD AUTO: 0 10E9/L (ref 0–0.2)
BASOPHILS NFR BLD AUTO: 0.2 %
BASOPHILS NFR BLD AUTO: 0.3 %
BASOPHILS NFR BLD AUTO: 0.4 %
BILIRUB SERPL-MCNC: 0.3 MG/DL (ref 0.2–1.3)
BILIRUB SERPL-MCNC: 0.4 MG/DL (ref 0.2–1.3)
BILIRUB SERPL-MCNC: 0.5 MG/DL (ref 0.2–1.3)
BILIRUB SERPL-MCNC: 0.5 MG/DL (ref 0.2–1.3)
BUN SERPL-MCNC: 12 MG/DL (ref 7–30)
BUN SERPL-MCNC: 14 MG/DL (ref 7–30)
BUN SERPL-MCNC: 15 MG/DL (ref 7–30)
BUN SERPL-MCNC: 16 MG/DL (ref 7–30)
BUN SERPL-MCNC: 17 MG/DL (ref 7–30)
BUN SERPL-MCNC: 17 MG/DL (ref 7–30)
BUN SERPL-MCNC: 18 MG/DL (ref 7–30)
BUN SERPL-MCNC: 18 MG/DL (ref 7–30)
BUN SERPL-MCNC: 19 MG/DL (ref 7–30)
CALCIUM SERPL-MCNC: 8.6 MG/DL (ref 8.5–10.1)
CALCIUM SERPL-MCNC: 8.7 MG/DL (ref 8.5–10.1)
CALCIUM SERPL-MCNC: 8.8 MG/DL (ref 8.5–10.1)
CALCIUM SERPL-MCNC: 8.9 MG/DL (ref 8.5–10.1)
CALCIUM SERPL-MCNC: 9 MG/DL (ref 8.5–10.1)
CALCIUM SERPL-MCNC: 9.1 MG/DL (ref 8.5–10.1)
CALCIUM SERPL-MCNC: 9.2 MG/DL (ref 8.5–10.1)
CHLORIDE SERPL-SCNC: 101 MMOL/L (ref 94–109)
CHLORIDE SERPL-SCNC: 102 MMOL/L (ref 94–109)
CHLORIDE SERPL-SCNC: 103 MMOL/L (ref 94–109)
CHLORIDE SERPL-SCNC: 104 MMOL/L (ref 94–109)
CHLORIDE SERPL-SCNC: 105 MMOL/L (ref 94–109)
CHLORIDE SERPL-SCNC: 106 MMOL/L (ref 94–109)
CHLORIDE SERPL-SCNC: 106 MMOL/L (ref 94–109)
CHOLEST SERPL-MCNC: 178 MG/DL
CO2 SERPL-SCNC: 24 MMOL/L (ref 20–32)
CO2 SERPL-SCNC: 25 MMOL/L (ref 20–32)
CO2 SERPL-SCNC: 26 MMOL/L (ref 20–32)
CO2 SERPL-SCNC: 26 MMOL/L (ref 20–32)
CO2 SERPL-SCNC: 27 MMOL/L (ref 20–32)
CO2 SERPL-SCNC: 27 MMOL/L (ref 20–32)
CO2 SERPL-SCNC: 28 MMOL/L (ref 20–32)
CO2 SERPL-SCNC: 29 MMOL/L (ref 20–32)
CO2 SERPL-SCNC: 29 MMOL/L (ref 20–32)
COPATH REPORT: NORMAL
COPATH REPORT: NORMAL
CREAT SERPL-MCNC: 0.4 MG/DL (ref 0.52–1.04)
CREAT SERPL-MCNC: 0.46 MG/DL (ref 0.52–1.04)
CREAT SERPL-MCNC: 0.49 MG/DL (ref 0.52–1.04)
CREAT SERPL-MCNC: 0.5 MG/DL (ref 0.52–1.04)
CREAT SERPL-MCNC: 0.5 MG/DL (ref 0.52–1.04)
CREAT SERPL-MCNC: 0.51 MG/DL (ref 0.52–1.04)
CREAT SERPL-MCNC: 0.51 MG/DL (ref 0.52–1.04)
CREAT SERPL-MCNC: 0.54 MG/DL (ref 0.52–1.04)
CREAT SERPL-MCNC: 0.58 MG/DL (ref 0.52–1.04)
CREAT SERPL-MCNC: 0.59 MG/DL (ref 0.52–1.04)
CREAT SERPL-MCNC: 0.71 MG/DL (ref 0.52–1.04)
CRP SERPL-MCNC: 21 MG/L (ref 0–8)
DEPRECATED CALCIDIOL+CALCIFEROL SERPL-MC: 56 UG/L (ref 20–75)
DIFFERENTIAL METHOD BLD: ABNORMAL
DIFFERENTIAL METHOD BLD: NORMAL
DIFFERENTIAL METHOD BLD: NORMAL
EOSINOPHIL # BLD AUTO: 0 10E9/L (ref 0–0.7)
EOSINOPHIL # BLD AUTO: 0.1 10E9/L (ref 0–0.7)
EOSINOPHIL # BLD AUTO: 0.1 10E9/L (ref 0–0.7)
EOSINOPHIL # BLD AUTO: 0.2 10E9/L (ref 0–0.7)
EOSINOPHIL NFR BLD AUTO: 0.1 %
EOSINOPHIL NFR BLD AUTO: 0.2 %
EOSINOPHIL NFR BLD AUTO: 0.4 %
EOSINOPHIL NFR BLD AUTO: 0.6 %
EOSINOPHIL NFR BLD AUTO: 0.7 %
EOSINOPHIL NFR BLD AUTO: 1.2 %
EOSINOPHIL NFR BLD AUTO: 1.4 %
EOSINOPHIL NFR BLD AUTO: 3.9 %
ERYTHROCYTE [DISTWIDTH] IN BLOOD BY AUTOMATED COUNT: 12.5 % (ref 10–15)
ERYTHROCYTE [DISTWIDTH] IN BLOOD BY AUTOMATED COUNT: 12.8 % (ref 10–15)
ERYTHROCYTE [DISTWIDTH] IN BLOOD BY AUTOMATED COUNT: 13.1 % (ref 10–15)
ERYTHROCYTE [DISTWIDTH] IN BLOOD BY AUTOMATED COUNT: 13.2 % (ref 10–15)
ERYTHROCYTE [DISTWIDTH] IN BLOOD BY AUTOMATED COUNT: 13.3 % (ref 10–15)
ERYTHROCYTE [DISTWIDTH] IN BLOOD BY AUTOMATED COUNT: 14.4 % (ref 10–15)
ERYTHROCYTE [DISTWIDTH] IN BLOOD BY AUTOMATED COUNT: 16.3 % (ref 10–15)
ERYTHROCYTE [DISTWIDTH] IN BLOOD BY AUTOMATED COUNT: 18.4 % (ref 10–15)
ERYTHROCYTE [DISTWIDTH] IN BLOOD BY AUTOMATED COUNT: 18.5 % (ref 10–15)
ERYTHROCYTE [DISTWIDTH] IN BLOOD BY AUTOMATED COUNT: 20.4 % (ref 10–15)
ERYTHROCYTE [SEDIMENTATION RATE] IN BLOOD BY WESTERGREN METHOD: 76 MM/H (ref 0–30)
GFR SERPL CREATININE-BSD FRML MDRD: 84 ML/MIN/{1.73_M2}
GFR SERPL CREATININE-BSD FRML MDRD: >90 ML/MIN/{1.73_M2}
GLUCOSE BLDC GLUCOMTR-MCNC: 92 MG/DL (ref 70–99)
GLUCOSE BLDC GLUCOMTR-MCNC: 93 MG/DL (ref 70–99)
GLUCOSE SERPL-MCNC: 100 MG/DL (ref 70–99)
GLUCOSE SERPL-MCNC: 100 MG/DL (ref 70–99)
GLUCOSE SERPL-MCNC: 114 MG/DL (ref 70–99)
GLUCOSE SERPL-MCNC: 76 MG/DL (ref 70–99)
GLUCOSE SERPL-MCNC: 84 MG/DL (ref 70–99)
GLUCOSE SERPL-MCNC: 85 MG/DL (ref 70–99)
GLUCOSE SERPL-MCNC: 87 MG/DL (ref 70–99)
GLUCOSE SERPL-MCNC: 93 MG/DL (ref 70–99)
GLUCOSE SERPL-MCNC: 94 MG/DL (ref 70–99)
GLUCOSE SERPL-MCNC: 94 MG/DL (ref 70–99)
GLUCOSE SERPL-MCNC: 97 MG/DL (ref 70–99)
HCT VFR BLD AUTO: 30.4 % (ref 35–47)
HCT VFR BLD AUTO: 31.2 % (ref 35–47)
HCT VFR BLD AUTO: 32.3 % (ref 35–47)
HCT VFR BLD AUTO: 32.6 % (ref 35–47)
HCT VFR BLD AUTO: 33.8 % (ref 35–47)
HCT VFR BLD AUTO: 34.8 % (ref 35–47)
HCT VFR BLD AUTO: 35.7 % (ref 35–47)
HCT VFR BLD AUTO: 36.8 % (ref 35–47)
HCT VFR BLD AUTO: 37.1 % (ref 35–47)
HCT VFR BLD AUTO: 37.3 % (ref 35–47)
HDLC SERPL-MCNC: 43 MG/DL
HGB BLD-MCNC: 10.1 G/DL (ref 11.7–15.7)
HGB BLD-MCNC: 10.6 G/DL (ref 11.7–15.7)
HGB BLD-MCNC: 10.8 G/DL (ref 11.7–15.7)
HGB BLD-MCNC: 10.9 G/DL (ref 11.7–15.7)
HGB BLD-MCNC: 11.3 G/DL (ref 11.7–15.7)
HGB BLD-MCNC: 11.5 G/DL (ref 11.7–15.7)
HGB BLD-MCNC: 11.7 G/DL (ref 11.7–15.7)
HGB BLD-MCNC: 11.8 G/DL (ref 11.7–15.7)
HGB BLD-MCNC: 12.1 G/DL (ref 11.7–15.7)
HGB BLD-MCNC: 9.9 G/DL (ref 11.7–15.7)
IMM GRANULOCYTES # BLD: 0 10E9/L (ref 0–0.4)
IMM GRANULOCYTES # BLD: 0.1 10E9/L (ref 0–0.4)
IMM GRANULOCYTES # BLD: 0.1 10E9/L (ref 0–0.4)
IMM GRANULOCYTES NFR BLD: 0.2 %
IMM GRANULOCYTES NFR BLD: 0.3 %
IMM GRANULOCYTES NFR BLD: 0.3 %
IMM GRANULOCYTES NFR BLD: 0.4 %
IMM GRANULOCYTES NFR BLD: 0.4 %
IMM GRANULOCYTES NFR BLD: 0.6 %
IMM GRANULOCYTES NFR BLD: 0.9 %
INTERPRETATION ECG - MUSE: NORMAL
LDH SERPL L TO P-CCNC: 230 U/L (ref 81–234)
LDLC SERPL CALC-MCNC: 112 MG/DL
LYMPHOCYTES # BLD AUTO: 0.6 10E9/L (ref 0.8–5.3)
LYMPHOCYTES # BLD AUTO: 0.7 10E9/L (ref 0.8–5.3)
LYMPHOCYTES # BLD AUTO: 0.7 10E9/L (ref 0.8–5.3)
LYMPHOCYTES # BLD AUTO: 0.8 10E9/L (ref 0.8–5.3)
LYMPHOCYTES # BLD AUTO: 0.8 10E9/L (ref 0.8–5.3)
LYMPHOCYTES # BLD AUTO: 0.9 10E9/L (ref 0.8–5.3)
LYMPHOCYTES # BLD AUTO: 1 10E9/L (ref 0.8–5.3)
LYMPHOCYTES # BLD AUTO: 1 10E9/L (ref 0.8–5.3)
LYMPHOCYTES NFR BLD AUTO: 10.1 %
LYMPHOCYTES NFR BLD AUTO: 10.2 %
LYMPHOCYTES NFR BLD AUTO: 11.1 %
LYMPHOCYTES NFR BLD AUTO: 14.1 %
LYMPHOCYTES NFR BLD AUTO: 15.4 %
LYMPHOCYTES NFR BLD AUTO: 16.9 %
LYMPHOCYTES NFR BLD AUTO: 17.1 %
LYMPHOCYTES NFR BLD AUTO: 17.5 %
LYMPHOCYTES NFR BLD AUTO: 26.3 %
LYMPHOCYTES NFR BLD AUTO: 8.1 %
MAGNESIUM SERPL-MCNC: 2 MG/DL (ref 1.6–2.3)
MAGNESIUM SERPL-MCNC: 2.2 MG/DL (ref 1.6–2.3)
MCH RBC QN AUTO: 29.7 PG (ref 26.5–33)
MCH RBC QN AUTO: 29.9 PG (ref 26.5–33)
MCH RBC QN AUTO: 30.1 PG (ref 26.5–33)
MCH RBC QN AUTO: 30.9 PG (ref 26.5–33)
MCH RBC QN AUTO: 30.9 PG (ref 26.5–33)
MCH RBC QN AUTO: 31 PG (ref 26.5–33)
MCH RBC QN AUTO: 32.1 PG (ref 26.5–33)
MCH RBC QN AUTO: 32.9 PG (ref 26.5–33)
MCH RBC QN AUTO: 33 PG (ref 26.5–33)
MCH RBC QN AUTO: 33.9 PG (ref 26.5–33)
MCHC RBC AUTO-ENTMCNC: 31.5 G/DL (ref 31.5–36.5)
MCHC RBC AUTO-ENTMCNC: 32.1 G/DL (ref 31.5–36.5)
MCHC RBC AUTO-ENTMCNC: 32.2 G/DL (ref 31.5–36.5)
MCHC RBC AUTO-ENTMCNC: 32.2 G/DL (ref 31.5–36.5)
MCHC RBC AUTO-ENTMCNC: 32.4 G/DL (ref 31.5–36.5)
MCHC RBC AUTO-ENTMCNC: 32.4 G/DL (ref 31.5–36.5)
MCHC RBC AUTO-ENTMCNC: 32.5 G/DL (ref 31.5–36.5)
MCHC RBC AUTO-ENTMCNC: 32.6 G/DL (ref 31.5–36.5)
MCHC RBC AUTO-ENTMCNC: 32.8 G/DL (ref 31.5–36.5)
MCHC RBC AUTO-ENTMCNC: 33.1 G/DL (ref 31.5–36.5)
MCV RBC AUTO: 101 FL (ref 78–100)
MCV RBC AUTO: 102 FL (ref 78–100)
MCV RBC AUTO: 103 FL (ref 78–100)
MCV RBC AUTO: 90 FL (ref 78–100)
MCV RBC AUTO: 94 FL (ref 78–100)
MCV RBC AUTO: 94 FL (ref 78–100)
MCV RBC AUTO: 95 FL (ref 78–100)
MCV RBC AUTO: 95 FL (ref 78–100)
MCV RBC AUTO: 96 FL (ref 78–100)
MCV RBC AUTO: 99 FL (ref 78–100)
MONOCYTES # BLD AUTO: 0.2 10E9/L (ref 0–1.3)
MONOCYTES # BLD AUTO: 0.3 10E9/L (ref 0–1.3)
MONOCYTES # BLD AUTO: 0.4 10E9/L (ref 0–1.3)
MONOCYTES # BLD AUTO: 0.5 10E9/L (ref 0–1.3)
MONOCYTES NFR BLD AUTO: 4.6 %
MONOCYTES NFR BLD AUTO: 5.1 %
MONOCYTES NFR BLD AUTO: 5.5 %
MONOCYTES NFR BLD AUTO: 5.6 %
MONOCYTES NFR BLD AUTO: 5.8 %
MONOCYTES NFR BLD AUTO: 5.8 %
MONOCYTES NFR BLD AUTO: 6 %
MONOCYTES NFR BLD AUTO: 6 %
MONOCYTES NFR BLD AUTO: 6.3 %
MONOCYTES NFR BLD AUTO: 6.3 %
NEUTROPHILS # BLD AUTO: 2.5 10E9/L (ref 1.6–8.3)
NEUTROPHILS # BLD AUTO: 3.1 10E9/L (ref 1.6–8.3)
NEUTROPHILS # BLD AUTO: 3.7 10E9/L (ref 1.6–8.3)
NEUTROPHILS # BLD AUTO: 4 10E9/L (ref 1.6–8.3)
NEUTROPHILS # BLD AUTO: 4.4 10E9/L (ref 1.6–8.3)
NEUTROPHILS # BLD AUTO: 4.6 10E9/L (ref 1.6–8.3)
NEUTROPHILS # BLD AUTO: 5 10E9/L (ref 1.6–8.3)
NEUTROPHILS # BLD AUTO: 5.7 10E9/L (ref 1.6–8.3)
NEUTROPHILS # BLD AUTO: 7.3 10E9/L (ref 1.6–8.3)
NEUTROPHILS # BLD AUTO: 8.2 10E9/L (ref 1.6–8.3)
NEUTROPHILS NFR BLD AUTO: 63.7 %
NEUTROPHILS NFR BLD AUTO: 74.9 %
NEUTROPHILS NFR BLD AUTO: 76.3 %
NEUTROPHILS NFR BLD AUTO: 76.5 %
NEUTROPHILS NFR BLD AUTO: 77.3 %
NEUTROPHILS NFR BLD AUTO: 79.7 %
NEUTROPHILS NFR BLD AUTO: 80.6 %
NEUTROPHILS NFR BLD AUTO: 82.1 %
NEUTROPHILS NFR BLD AUTO: 82.9 %
NEUTROPHILS NFR BLD AUTO: 86.2 %
NONHDLC SERPL-MCNC: 135 MG/DL
NRBC # BLD AUTO: 0 10*3/UL
NRBC BLD AUTO-RTO: 0 /100
PLATELET # BLD AUTO: 189 10E9/L (ref 150–450)
PLATELET # BLD AUTO: 198 10E9/L (ref 150–450)
PLATELET # BLD AUTO: 210 10E9/L (ref 150–450)
PLATELET # BLD AUTO: 234 10E9/L (ref 150–450)
PLATELET # BLD AUTO: 272 10E9/L (ref 150–450)
PLATELET # BLD AUTO: 277 10E9/L (ref 150–450)
PLATELET # BLD AUTO: 277 10E9/L (ref 150–450)
PLATELET # BLD AUTO: 286 10E9/L (ref 150–450)
PLATELET # BLD AUTO: 307 10E9/L (ref 150–450)
PLATELET # BLD AUTO: 351 10E9/L (ref 150–450)
POTASSIUM SERPL-SCNC: 3.6 MMOL/L (ref 3.4–5.3)
POTASSIUM SERPL-SCNC: 3.7 MMOL/L (ref 3.4–5.3)
POTASSIUM SERPL-SCNC: 3.7 MMOL/L (ref 3.4–5.3)
POTASSIUM SERPL-SCNC: 3.8 MMOL/L (ref 3.4–5.3)
POTASSIUM SERPL-SCNC: 3.8 MMOL/L (ref 3.4–5.3)
POTASSIUM SERPL-SCNC: 3.9 MMOL/L (ref 3.4–5.3)
POTASSIUM SERPL-SCNC: 4 MMOL/L (ref 3.4–5.3)
POTASSIUM SERPL-SCNC: 4.4 MMOL/L (ref 3.4–5.3)
POTASSIUM SERPL-SCNC: 4.4 MMOL/L (ref 3.4–5.3)
PROT SERPL-MCNC: 7.7 G/DL (ref 6.8–8.8)
PROT SERPL-MCNC: 8 G/DL (ref 6.8–8.8)
PROT SERPL-MCNC: 8.2 G/DL (ref 6.8–8.8)
PROT SERPL-MCNC: 8.3 G/DL (ref 6.8–8.8)
PROT SERPL-MCNC: 8.3 G/DL (ref 6.8–8.8)
PROT SERPL-MCNC: 8.4 G/DL (ref 6.8–8.8)
PROT SERPL-MCNC: 8.7 G/DL (ref 6.8–8.8)
PROT SERPL-MCNC: 8.7 G/DL (ref 6.8–8.8)
PROT SERPL-MCNC: 8.8 G/DL (ref 6.8–8.8)
PROT SERPL-MCNC: 8.8 G/DL (ref 6.8–8.8)
RADIOLOGIST FLAGS: ABNORMAL
RBC # BLD AUTO: 3.01 10E12/L (ref 3.8–5.2)
RBC # BLD AUTO: 3.13 10E12/L (ref 3.8–5.2)
RBC # BLD AUTO: 3.27 10E12/L (ref 3.8–5.2)
RBC # BLD AUTO: 3.4 10E12/L (ref 3.8–5.2)
RBC # BLD AUTO: 3.49 10E12/L (ref 3.8–5.2)
RBC # BLD AUTO: 3.61 10E12/L (ref 3.8–5.2)
RBC # BLD AUTO: 3.64 10E12/L (ref 3.8–5.2)
RBC # BLD AUTO: 3.91 10E12/L (ref 3.8–5.2)
RBC # BLD AUTO: 3.92 10E12/L (ref 3.8–5.2)
RBC # BLD AUTO: 3.94 10E12/L (ref 3.8–5.2)
SODIUM SERPL-SCNC: 135 MMOL/L (ref 133–144)
SODIUM SERPL-SCNC: 136 MMOL/L (ref 133–144)
SODIUM SERPL-SCNC: 136 MMOL/L (ref 133–144)
SODIUM SERPL-SCNC: 137 MMOL/L (ref 133–144)
SODIUM SERPL-SCNC: 137 MMOL/L (ref 133–144)
SODIUM SERPL-SCNC: 138 MMOL/L (ref 133–144)
SODIUM SERPL-SCNC: 140 MMOL/L (ref 133–144)
T4 FREE SERPL-MCNC: 1.24 NG/DL (ref 0.76–1.46)
T4 FREE SERPL-MCNC: 1.41 NG/DL (ref 0.76–1.46)
T4 FREE SERPL-MCNC: 1.58 NG/DL (ref 0.76–1.46)
T4 FREE SERPL-MCNC: 1.66 NG/DL (ref 0.76–1.46)
TRIGL SERPL-MCNC: 114 MG/DL
TROPONIN I SERPL-MCNC: <0.015 UG/L (ref 0–0.04)
TSH SERPL DL<=0.005 MIU/L-ACNC: 1.36 MU/L (ref 0.4–4)
TSH SERPL DL<=0.005 MIU/L-ACNC: 1.44 MU/L (ref 0.4–4)
TSH SERPL DL<=0.005 MIU/L-ACNC: 1.87 MU/L (ref 0.4–4)
TSH SERPL DL<=0.005 MIU/L-ACNC: 2.06 MU/L (ref 0.4–4)
TSH SERPL DL<=0.005 MIU/L-ACNC: 2.06 MU/L (ref 0.4–4)
TSH SERPL DL<=0.005 MIU/L-ACNC: 3.01 MU/L (ref 0.4–4)
TSH SERPL DL<=0.005 MIU/L-ACNC: 3.63 MU/L (ref 0.4–4)
TSH SERPL DL<=0.005 MIU/L-ACNC: 3.7 MU/L (ref 0.4–4)
TSH SERPL DL<=0.005 MIU/L-ACNC: 4 MU/L (ref 0.4–4)
WBC # BLD AUTO: 3.8 10E9/L (ref 4–11)
WBC # BLD AUTO: 4.2 10E9/L (ref 4–11)
WBC # BLD AUTO: 4.9 10E9/L (ref 4–11)
WBC # BLD AUTO: 5 10E9/L (ref 4–11)
WBC # BLD AUTO: 5.7 10E9/L (ref 4–11)
WBC # BLD AUTO: 6 10E9/L (ref 4–11)
WBC # BLD AUTO: 6.3 10E9/L (ref 4–11)
WBC # BLD AUTO: 7 10E9/L (ref 4–11)
WBC # BLD AUTO: 8.8 10E9/L (ref 4–11)
WBC # BLD AUTO: 9.6 10E9/L (ref 4–11)

## 2019-01-01 PROCEDURE — 96375 TX/PRO/DX INJ NEW DRUG ADDON: CPT

## 2019-01-01 PROCEDURE — 71250 CT THORAX DX C-: CPT

## 2019-01-01 PROCEDURE — 85025 COMPLETE CBC W/AUTO DIFF WBC: CPT | Performed by: NURSE PRACTITIONER

## 2019-01-01 PROCEDURE — 25800030 ZZH RX IP 258 OP 636: Performed by: NURSE ANESTHETIST, CERTIFIED REGISTERED

## 2019-01-01 PROCEDURE — 96413 CHEMO IV INFUSION 1 HR: CPT

## 2019-01-01 PROCEDURE — 96417 CHEMO IV INFUS EACH ADDL SEQ: CPT

## 2019-01-01 PROCEDURE — 40000556 ZZH STATISTIC PERIPHERAL IV START W US GUIDANCE: Mod: ZF

## 2019-01-01 PROCEDURE — 84443 ASSAY THYROID STIM HORMONE: CPT | Mod: 91 | Performed by: INTERNAL MEDICINE

## 2019-01-01 PROCEDURE — 99214 OFFICE O/P EST MOD 30 MIN: CPT | Mod: ZP | Performed by: INTERNAL MEDICINE

## 2019-01-01 PROCEDURE — 40000114 ZZH STATISTIC NO CHARGE CLINIC VISIT

## 2019-01-01 PROCEDURE — 99214 OFFICE O/P EST MOD 30 MIN: CPT | Mod: ZP | Performed by: NURSE PRACTITIONER

## 2019-01-01 PROCEDURE — 96367 TX/PROPH/DG ADDL SEQ IV INF: CPT

## 2019-01-01 PROCEDURE — 25000125 ZZHC RX 250: Performed by: NURSE ANESTHETIST, CERTIFIED REGISTERED

## 2019-01-01 PROCEDURE — 88185 FLOWCYTOMETRY/TC ADD-ON: CPT | Performed by: INTERNAL MEDICINE

## 2019-01-01 PROCEDURE — 25800030 ZZH RX IP 258 OP 636: Mod: ZF | Performed by: INTERNAL MEDICINE

## 2019-01-01 PROCEDURE — 80053 COMPREHEN METABOLIC PANEL: CPT | Performed by: INTERNAL MEDICINE

## 2019-01-01 PROCEDURE — 99285 EMERGENCY DEPT VISIT HI MDM: CPT | Mod: 25

## 2019-01-01 PROCEDURE — 40000141 ZZH STATISTIC PERIPHERAL IV START W/O US GUIDANCE

## 2019-01-01 PROCEDURE — 83735 ASSAY OF MAGNESIUM: CPT | Performed by: EMERGENCY MEDICINE

## 2019-01-01 PROCEDURE — 25000128 H RX IP 250 OP 636: Performed by: CLINICAL NURSE SPECIALIST

## 2019-01-01 PROCEDURE — 85025 COMPLETE CBC W/AUTO DIFF WBC: CPT | Performed by: INTERNAL MEDICINE

## 2019-01-01 PROCEDURE — 83615 LACTATE (LD) (LDH) ENZYME: CPT | Performed by: INTERNAL MEDICINE

## 2019-01-01 PROCEDURE — 36000062 ZZH SURGERY LEVEL 4 1ST 30 MIN - UMMC: Performed by: INTERNAL MEDICINE

## 2019-01-01 PROCEDURE — 40000141 ZZH STATISTIC PERIPHERAL IV START W/O US GUIDANCE: Mod: ZF

## 2019-01-01 PROCEDURE — 36591 DRAW BLOOD OFF VENOUS DEVICE: CPT

## 2019-01-01 PROCEDURE — 96372 THER/PROPH/DIAG INJ SC/IM: CPT

## 2019-01-01 PROCEDURE — 96377 APPLICATON ON-BODY INJECTOR: CPT | Mod: 59

## 2019-01-01 PROCEDURE — 25000128 H RX IP 250 OP 636: Mod: ZF | Performed by: NURSE PRACTITIONER

## 2019-01-01 PROCEDURE — 88172 CYTP DX EVAL FNA 1ST EA SITE: CPT | Performed by: INTERNAL MEDICINE

## 2019-01-01 PROCEDURE — 93000 ELECTROCARDIOGRAM COMPLETE: CPT | Performed by: STUDENT IN AN ORGANIZED HEALTH CARE EDUCATION/TRAINING PROGRAM

## 2019-01-01 PROCEDURE — 25800030 ZZH RX IP 258 OP 636: Mod: ZF | Performed by: NURSE PRACTITIONER

## 2019-01-01 PROCEDURE — 40000170 ZZH STATISTIC PRE-PROCEDURE ASSESSMENT II: Performed by: INTERNAL MEDICINE

## 2019-01-01 PROCEDURE — 37000008 ZZH ANESTHESIA TECHNICAL FEE, 1ST 30 MIN: Performed by: INTERNAL MEDICINE

## 2019-01-01 PROCEDURE — 71260 CT THORAX DX C+: CPT

## 2019-01-01 PROCEDURE — 25000128 H RX IP 250 OP 636: Performed by: EMERGENCY MEDICINE

## 2019-01-01 PROCEDURE — 84443 ASSAY THYROID STIM HORMONE: CPT | Performed by: INTERNAL MEDICINE

## 2019-01-01 PROCEDURE — 25000125 ZZHC RX 250: Performed by: EMERGENCY MEDICINE

## 2019-01-01 PROCEDURE — 84439 ASSAY OF FREE THYROXINE: CPT | Performed by: INTERNAL MEDICINE

## 2019-01-01 PROCEDURE — 40001004 ZZHCL STATISTIC FLOW INT 9-15 ABY TC 88188: Performed by: INTERNAL MEDICINE

## 2019-01-01 PROCEDURE — 25000128 H RX IP 250 OP 636: Mod: ZF | Performed by: INTERNAL MEDICINE

## 2019-01-01 PROCEDURE — 88341 IMHCHEM/IMCYTCHM EA ADD ANTB: CPT | Performed by: INTERNAL MEDICINE

## 2019-01-01 PROCEDURE — 88173 CYTOPATH EVAL FNA REPORT: CPT | Performed by: INTERNAL MEDICINE

## 2019-01-01 PROCEDURE — 71275 CT ANGIOGRAPHY CHEST: CPT

## 2019-01-01 PROCEDURE — 96360 HYDRATION IV INFUSION INIT: CPT | Mod: 59

## 2019-01-01 PROCEDURE — G0463 HOSPITAL OUTPT CLINIC VISIT: HCPCS | Mod: ZF

## 2019-01-01 PROCEDURE — 99205 OFFICE O/P NEW HI 60 MIN: CPT | Mod: ZP | Performed by: INTERNAL MEDICINE

## 2019-01-01 PROCEDURE — 82962 GLUCOSE BLOOD TEST: CPT

## 2019-01-01 PROCEDURE — 99204 OFFICE O/P NEW MOD 45 MIN: CPT | Performed by: INTERNAL MEDICINE

## 2019-01-01 PROCEDURE — 34300033 ZZH RX 343: Performed by: CLINICAL NURSE SPECIALIST

## 2019-01-01 PROCEDURE — 78816 PET IMAGE W/CT FULL BODY: CPT | Mod: PS

## 2019-01-01 PROCEDURE — 74177 CT ABD & PELVIS W/CONTRAST: CPT

## 2019-01-01 PROCEDURE — 94640 AIRWAY INHALATION TREATMENT: CPT

## 2019-01-01 PROCEDURE — 99397 PER PM REEVAL EST PAT 65+ YR: CPT | Performed by: INTERNAL MEDICINE

## 2019-01-01 PROCEDURE — 25800030 ZZH RX IP 258 OP 636: Performed by: ANESTHESIOLOGY

## 2019-01-01 PROCEDURE — A9585 GADOBUTROL INJECTION: HCPCS | Performed by: CLINICAL NURSE SPECIALIST

## 2019-01-01 PROCEDURE — 70553 MRI BRAIN STEM W/O & W/DYE: CPT

## 2019-01-01 PROCEDURE — 25800030 ZZH RX IP 258 OP 636: Performed by: EMERGENCY MEDICINE

## 2019-01-01 PROCEDURE — 93005 ELECTROCARDIOGRAM TRACING: CPT

## 2019-01-01 PROCEDURE — 40000986 XR CHEST PORT 1 VW

## 2019-01-01 PROCEDURE — 25500064 ZZH RX 255 OP 636: Performed by: CLINICAL NURSE SPECIALIST

## 2019-01-01 PROCEDURE — 84443 ASSAY THYROID STIM HORMONE: CPT | Performed by: NURSE PRACTITIONER

## 2019-01-01 PROCEDURE — 25000125 ZZHC RX 250: Performed by: ANESTHESIOLOGY

## 2019-01-01 PROCEDURE — 85652 RBC SED RATE AUTOMATED: CPT | Performed by: INTERNAL MEDICINE

## 2019-01-01 PROCEDURE — 25000128 H RX IP 250 OP 636: Performed by: NURSE ANESTHETIST, CERTIFIED REGISTERED

## 2019-01-01 PROCEDURE — 36415 COLL VENOUS BLD VENIPUNCTURE: CPT | Performed by: INTERNAL MEDICINE

## 2019-01-01 PROCEDURE — 36415 COLL VENOUS BLD VENIPUNCTURE: CPT

## 2019-01-01 PROCEDURE — 36000064 ZZH SURGERY LEVEL 4 EA 15 ADDTL MIN - UMMC: Performed by: INTERNAL MEDICINE

## 2019-01-01 PROCEDURE — 37000009 ZZH ANESTHESIA TECHNICAL FEE, EACH ADDTL 15 MIN: Performed by: INTERNAL MEDICINE

## 2019-01-01 PROCEDURE — 99214 OFFICE O/P EST MOD 30 MIN: CPT | Mod: 25 | Performed by: INTERNAL MEDICINE

## 2019-01-01 PROCEDURE — 80048 BASIC METABOLIC PNL TOTAL CA: CPT | Performed by: EMERGENCY MEDICINE

## 2019-01-01 PROCEDURE — 99215 OFFICE O/P EST HI 40 MIN: CPT | Mod: ZP | Performed by: INTERNAL MEDICINE

## 2019-01-01 PROCEDURE — 82306 VITAMIN D 25 HYDROXY: CPT | Performed by: INTERNAL MEDICINE

## 2019-01-01 PROCEDURE — 83735 ASSAY OF MAGNESIUM: CPT | Performed by: INTERNAL MEDICINE

## 2019-01-01 PROCEDURE — 25000128 H RX IP 250 OP 636: Performed by: INTERNAL MEDICINE

## 2019-01-01 PROCEDURE — 71000014 ZZH RECOVERY PHASE 1 LEVEL 2 FIRST HR: Performed by: INTERNAL MEDICINE

## 2019-01-01 PROCEDURE — A9552 F18 FDG: HCPCS | Performed by: CLINICAL NURSE SPECIALIST

## 2019-01-01 PROCEDURE — 85025 COMPLETE CBC W/AUTO DIFF WBC: CPT | Performed by: EMERGENCY MEDICINE

## 2019-01-01 PROCEDURE — 80053 COMPREHEN METABOLIC PANEL: CPT | Performed by: NURSE PRACTITIONER

## 2019-01-01 PROCEDURE — 88305 TISSUE EXAM BY PATHOLOGIST: CPT | Performed by: INTERNAL MEDICINE

## 2019-01-01 PROCEDURE — 00000155 ZZHCL STATISTIC H-CELL BLOCK W/STAIN: Performed by: INTERNAL MEDICINE

## 2019-01-01 PROCEDURE — 84484 ASSAY OF TROPONIN QUANT: CPT | Performed by: EMERGENCY MEDICINE

## 2019-01-01 PROCEDURE — 86140 C-REACTIVE PROTEIN: CPT | Performed by: INTERNAL MEDICINE

## 2019-01-01 PROCEDURE — 88184 FLOWCYTOMETRY/ TC 1 MARKER: CPT | Performed by: INTERNAL MEDICINE

## 2019-01-01 PROCEDURE — 71000027 ZZH RECOVERY PHASE 2 EACH 15 MINS: Performed by: INTERNAL MEDICINE

## 2019-01-01 PROCEDURE — 99215 OFFICE O/P EST HI 40 MIN: CPT | Mod: GC | Performed by: STUDENT IN AN ORGANIZED HEALTH CARE EDUCATION/TRAINING PROGRAM

## 2019-01-01 PROCEDURE — 71045 X-RAY EXAM CHEST 1 VIEW: CPT

## 2019-01-01 PROCEDURE — 88342 IMHCHEM/IMCYTCHM 1ST ANTB: CPT | Mod: XU | Performed by: INTERNAL MEDICINE

## 2019-01-01 PROCEDURE — 80061 LIPID PANEL: CPT | Performed by: INTERNAL MEDICINE

## 2019-01-01 PROCEDURE — 27210794 ZZH OR GENERAL SUPPLY STERILE: Performed by: INTERNAL MEDICINE

## 2019-01-01 RX ORDER — NALOXONE HYDROCHLORIDE 0.4 MG/ML
.1-.4 INJECTION, SOLUTION INTRAMUSCULAR; INTRAVENOUS; SUBCUTANEOUS
Status: CANCELLED | OUTPATIENT
Start: 2019-01-01

## 2019-01-01 RX ORDER — EPINEPHRINE 1 MG/ML
0.3 INJECTION, SOLUTION INTRAMUSCULAR; SUBCUTANEOUS EVERY 5 MIN PRN
Status: CANCELLED | OUTPATIENT
Start: 2019-01-01

## 2019-01-01 RX ORDER — DIPHENHYDRAMINE HYDROCHLORIDE 50 MG/ML
50 INJECTION INTRAMUSCULAR; INTRAVENOUS
Status: CANCELLED
Start: 2019-01-01

## 2019-01-01 RX ORDER — ALBUTEROL SULFATE 90 UG/1
1-2 AEROSOL, METERED RESPIRATORY (INHALATION)
Status: CANCELLED
Start: 2019-01-01

## 2019-01-01 RX ORDER — METHYLPREDNISOLONE SODIUM SUCCINATE 125 MG/2ML
125 INJECTION, POWDER, LYOPHILIZED, FOR SOLUTION INTRAMUSCULAR; INTRAVENOUS
Status: CANCELLED
Start: 2019-01-01

## 2019-01-01 RX ORDER — MEPERIDINE HYDROCHLORIDE 25 MG/ML
25 INJECTION INTRAMUSCULAR; INTRAVENOUS; SUBCUTANEOUS EVERY 30 MIN PRN
Status: CANCELLED | OUTPATIENT
Start: 2019-01-01

## 2019-01-01 RX ORDER — SODIUM CHLORIDE 9 MG/ML
1000 INJECTION, SOLUTION INTRAVENOUS CONTINUOUS PRN
Status: CANCELLED
Start: 2019-01-01

## 2019-01-01 RX ORDER — EPINEPHRINE 0.3 MG/.3ML
0.3 INJECTION SUBCUTANEOUS EVERY 5 MIN PRN
Status: CANCELLED | OUTPATIENT
Start: 2019-01-01

## 2019-01-01 RX ORDER — ALBUTEROL SULFATE 90 UG/1
2 AEROSOL, METERED RESPIRATORY (INHALATION) EVERY 4 HOURS PRN
Qty: 18 G | Refills: 3 | Status: SHIPPED | OUTPATIENT
Start: 2019-01-01 | End: 2019-01-01

## 2019-01-01 RX ORDER — PALONOSETRON 0.05 MG/ML
0.25 INJECTION, SOLUTION INTRAVENOUS ONCE
Status: COMPLETED | OUTPATIENT
Start: 2019-01-01 | End: 2019-01-01

## 2019-01-01 RX ORDER — LORAZEPAM 2 MG/ML
0.5 INJECTION INTRAMUSCULAR EVERY 4 HOURS PRN
Status: CANCELLED
Start: 2019-01-01

## 2019-01-01 RX ORDER — VARENICLINE TARTRATE 1 MG/1
1 TABLET, FILM COATED ORAL 2 TIMES DAILY
Qty: 60 TABLET | Refills: 1 | Status: SHIPPED | OUTPATIENT
Start: 2019-01-01 | End: 2019-01-01

## 2019-01-01 RX ORDER — ALBUTEROL SULFATE 0.83 MG/ML
2.5 SOLUTION RESPIRATORY (INHALATION)
Status: CANCELLED | OUTPATIENT
Start: 2019-01-01

## 2019-01-01 RX ORDER — LEVOTHYROXINE SODIUM 175 UG/1
TABLET ORAL
Qty: 90 TABLET | Refills: 2 | OUTPATIENT
Start: 2019-01-01

## 2019-01-01 RX ORDER — PROPOFOL 10 MG/ML
INJECTION, EMULSION INTRAVENOUS PRN
Status: DISCONTINUED | OUTPATIENT
Start: 2019-01-01 | End: 2019-01-01

## 2019-01-01 RX ORDER — ONDANSETRON 2 MG/ML
INJECTION INTRAMUSCULAR; INTRAVENOUS PRN
Status: DISCONTINUED | OUTPATIENT
Start: 2019-01-01 | End: 2019-01-01

## 2019-01-01 RX ORDER — IPRATROPIUM BROMIDE AND ALBUTEROL SULFATE 2.5; .5 MG/3ML; MG/3ML
3 SOLUTION RESPIRATORY (INHALATION) ONCE
Status: COMPLETED | OUTPATIENT
Start: 2019-01-01 | End: 2019-01-01

## 2019-01-01 RX ORDER — LEVOTHYROXINE SODIUM 175 UG/1
175 TABLET ORAL DAILY
Qty: 90 TABLET | Refills: 3 | COMMUNITY
Start: 2019-01-01 | End: 2019-01-01

## 2019-01-01 RX ORDER — PROPOFOL 10 MG/ML
INJECTION, EMULSION INTRAVENOUS CONTINUOUS PRN
Status: DISCONTINUED | OUTPATIENT
Start: 2019-01-01 | End: 2019-01-01

## 2019-01-01 RX ORDER — IOPAMIDOL 755 MG/ML
73 INJECTION, SOLUTION INTRAVASCULAR ONCE
Status: COMPLETED | OUTPATIENT
Start: 2019-01-01 | End: 2019-01-01

## 2019-01-01 RX ORDER — SODIUM CHLORIDE 9 MG/ML
1000 INJECTION, SOLUTION INTRAVENOUS CONTINUOUS
Status: DISCONTINUED | OUTPATIENT
Start: 2019-01-01 | End: 2019-01-01 | Stop reason: HOSPADM

## 2019-01-01 RX ORDER — PALONOSETRON 0.05 MG/ML
0.25 INJECTION, SOLUTION INTRAVENOUS ONCE
Status: CANCELLED
Start: 2019-01-01

## 2019-01-01 RX ORDER — PREDNISONE 20 MG/1
TABLET ORAL
Qty: 10 TABLET | Refills: 0 | Status: SHIPPED | OUTPATIENT
Start: 2019-01-01

## 2019-01-01 RX ORDER — GADOBUTROL 604.72 MG/ML
5 INJECTION INTRAVENOUS ONCE
Status: COMPLETED | OUTPATIENT
Start: 2019-01-01 | End: 2019-01-01

## 2019-01-01 RX ORDER — ALBUTEROL SULFATE 0.83 MG/ML
2.5 SOLUTION RESPIRATORY (INHALATION) EVERY 6 HOURS PRN
Qty: 75 ML | Refills: 0 | Status: SHIPPED | OUTPATIENT
Start: 2019-01-01 | End: 2019-01-01

## 2019-01-01 RX ORDER — MEPERIDINE HYDROCHLORIDE 25 MG/ML
12.5 INJECTION INTRAMUSCULAR; INTRAVENOUS; SUBCUTANEOUS
Status: DISCONTINUED | OUTPATIENT
Start: 2019-01-01 | End: 2019-01-01 | Stop reason: HOSPADM

## 2019-01-01 RX ORDER — NALOXONE HYDROCHLORIDE 0.4 MG/ML
.1-.4 INJECTION, SOLUTION INTRAMUSCULAR; INTRAVENOUS; SUBCUTANEOUS
Status: DISCONTINUED | OUTPATIENT
Start: 2019-01-01 | End: 2019-01-01 | Stop reason: HOSPADM

## 2019-01-01 RX ORDER — IPRATROPIUM BROMIDE AND ALBUTEROL SULFATE 2.5; .5 MG/3ML; MG/3ML
3 SOLUTION RESPIRATORY (INHALATION)
Status: COMPLETED | OUTPATIENT
Start: 2019-01-01 | End: 2019-01-01

## 2019-01-01 RX ORDER — VARENICLINE TARTRATE 1 MG/1
1 TABLET, FILM COATED ORAL 2 TIMES DAILY
Qty: 60 TABLET | Refills: 1 | Status: SHIPPED | OUTPATIENT
Start: 2019-01-01

## 2019-01-01 RX ORDER — ONDANSETRON 2 MG/ML
4 INJECTION INTRAMUSCULAR; INTRAVENOUS EVERY 30 MIN PRN
Status: DISCONTINUED | OUTPATIENT
Start: 2019-01-01 | End: 2019-01-01 | Stop reason: HOSPADM

## 2019-01-01 RX ORDER — TIOTROPIUM BROMIDE INHALATION SPRAY 3.12 UG/1
SPRAY, METERED RESPIRATORY (INHALATION)
Qty: 12 G | Refills: 4 | OUTPATIENT
Start: 2019-01-01

## 2019-01-01 RX ORDER — ATORVASTATIN CALCIUM 20 MG/1
20 TABLET, FILM COATED ORAL DAILY
Qty: 90 TABLET | Refills: 3 | Status: CANCELLED | OUTPATIENT
Start: 2019-01-01

## 2019-01-01 RX ORDER — DEXAMETHASONE SODIUM PHOSPHATE 4 MG/ML
INJECTION, SOLUTION INTRA-ARTICULAR; INTRALESIONAL; INTRAMUSCULAR; INTRAVENOUS; SOFT TISSUE PRN
Status: DISCONTINUED | OUTPATIENT
Start: 2019-01-01 | End: 2019-01-01

## 2019-01-01 RX ORDER — FENTANYL CITRATE 50 UG/ML
25-50 INJECTION, SOLUTION INTRAMUSCULAR; INTRAVENOUS
Status: DISCONTINUED | OUTPATIENT
Start: 2019-01-01 | End: 2019-01-01 | Stop reason: HOSPADM

## 2019-01-01 RX ORDER — LEVOTHYROXINE SODIUM 175 UG/1
175 TABLET ORAL DAILY
Qty: 90 TABLET | Refills: 3 | Status: SHIPPED | OUTPATIENT
Start: 2019-01-01 | End: 2019-01-01

## 2019-01-01 RX ORDER — SODIUM CHLORIDE, SODIUM LACTATE, POTASSIUM CHLORIDE, CALCIUM CHLORIDE 600; 310; 30; 20 MG/100ML; MG/100ML; MG/100ML; MG/100ML
INJECTION, SOLUTION INTRAVENOUS CONTINUOUS
Status: DISCONTINUED | OUTPATIENT
Start: 2019-01-01 | End: 2019-01-01 | Stop reason: HOSPADM

## 2019-01-01 RX ORDER — ONDANSETRON 4 MG/1
4 TABLET, ORALLY DISINTEGRATING ORAL EVERY 30 MIN PRN
Status: DISCONTINUED | OUTPATIENT
Start: 2019-01-01 | End: 2019-01-01 | Stop reason: HOSPADM

## 2019-01-01 RX ORDER — IOPAMIDOL 755 MG/ML
72 INJECTION, SOLUTION INTRAVASCULAR ONCE
Status: COMPLETED | OUTPATIENT
Start: 2019-01-01 | End: 2019-01-01

## 2019-01-01 RX ORDER — FENTANYL CITRATE 50 UG/ML
INJECTION, SOLUTION INTRAMUSCULAR; INTRAVENOUS PRN
Status: DISCONTINUED | OUTPATIENT
Start: 2019-01-01 | End: 2019-01-01

## 2019-01-01 RX ORDER — ALBUTEROL SULFATE 90 UG/1
2 AEROSOL, METERED RESPIRATORY (INHALATION) EVERY 4 HOURS PRN
Qty: 3 INHALER | Refills: 3 | Status: SHIPPED | OUTPATIENT
Start: 2019-01-01

## 2019-01-01 RX ORDER — LABETALOL 20 MG/4 ML (5 MG/ML) INTRAVENOUS SYRINGE
10
Status: DISCONTINUED | OUTPATIENT
Start: 2019-01-01 | End: 2019-01-01 | Stop reason: HOSPADM

## 2019-01-01 RX ORDER — TIOTROPIUM BROMIDE INHALATION SPRAY 3.12 UG/1
SPRAY, METERED RESPIRATORY (INHALATION)
Qty: 12 G | Refills: 4 | Status: SHIPPED | OUTPATIENT
Start: 2019-01-01

## 2019-01-01 RX ORDER — PROCHLORPERAZINE MALEATE 10 MG
10 TABLET ORAL EVERY 6 HOURS PRN
Qty: 30 TABLET | Refills: 11 | Status: SHIPPED | OUTPATIENT
Start: 2019-01-01

## 2019-01-01 RX ORDER — FENTANYL CITRATE 50 UG/ML
25-50 INJECTION, SOLUTION INTRAMUSCULAR; INTRAVENOUS EVERY 5 MIN PRN
Status: DISCONTINUED | OUTPATIENT
Start: 2019-01-01 | End: 2019-01-01 | Stop reason: HOSPADM

## 2019-01-01 RX ORDER — IOPAMIDOL 755 MG/ML
59 INJECTION, SOLUTION INTRAVASCULAR ONCE
Status: COMPLETED | OUTPATIENT
Start: 2019-01-01 | End: 2019-01-01

## 2019-01-01 RX ORDER — LIDOCAINE HYDROCHLORIDE 20 MG/ML
INJECTION, SOLUTION INFILTRATION; PERINEURAL PRN
Status: DISCONTINUED | OUTPATIENT
Start: 2019-01-01 | End: 2019-01-01

## 2019-01-01 RX ORDER — ATORVASTATIN CALCIUM 20 MG/1
TABLET, FILM COATED ORAL
Refills: 3 | COMMUNITY
Start: 2019-01-01 | End: 2019-01-01

## 2019-01-01 RX ORDER — ALBUTEROL SULFATE 0.83 MG/ML
2.5 SOLUTION RESPIRATORY (INHALATION) EVERY 6 HOURS PRN
Qty: 300 ML | Refills: 11 | Status: SHIPPED | OUTPATIENT
Start: 2019-01-01

## 2019-01-01 RX ORDER — IOPAMIDOL 755 MG/ML
10-135 INJECTION, SOLUTION INTRAVASCULAR ONCE
Status: COMPLETED | OUTPATIENT
Start: 2019-01-01 | End: 2019-01-01

## 2019-01-01 RX ORDER — DOXYCYCLINE 100 MG/1
100 CAPSULE ORAL 2 TIMES DAILY
Qty: 14 CAPSULE | Refills: 0 | Status: SHIPPED | OUTPATIENT
Start: 2019-01-01 | End: 2019-01-01

## 2019-01-01 RX ORDER — LEVOTHYROXINE SODIUM 150 UG/1
150 TABLET ORAL DAILY
Qty: 90 TABLET | Refills: 3 | Status: SHIPPED | OUTPATIENT
Start: 2019-01-01

## 2019-01-01 RX ORDER — LEVOTHYROXINE SODIUM 150 UG/1
150 TABLET ORAL DAILY
Qty: 90 TABLET | Refills: 1 | Status: SHIPPED | OUTPATIENT
Start: 2019-01-01 | End: 2019-01-01

## 2019-01-01 RX ORDER — ALBUTEROL SULFATE 5 MG/ML
2.5 SOLUTION RESPIRATORY (INHALATION) EVERY 6 HOURS PRN
Status: DISCONTINUED | OUTPATIENT
Start: 2019-01-01 | End: 2019-01-01 | Stop reason: HOSPADM

## 2019-01-01 RX ORDER — ONDANSETRON 8 MG/1
8 TABLET, FILM COATED ORAL EVERY 8 HOURS PRN
Qty: 30 TABLET | Refills: 11 | Status: SHIPPED | OUTPATIENT
Start: 2019-01-01

## 2019-01-01 RX ADMIN — ATEZOLIZUMAB 1200 MG: 1200 INJECTION, SOLUTION INTRAVENOUS at 14:00

## 2019-01-01 RX ADMIN — ETOPOSIDE 150 MG: 20 INJECTION INTRAVENOUS at 12:36

## 2019-01-01 RX ADMIN — PEGFILGRASTIM 6 MG: KIT SUBCUTANEOUS at 12:10

## 2019-01-01 RX ADMIN — CARBOPLATIN 550 MG: 10 INJECTION, SOLUTION INTRAVENOUS at 11:13

## 2019-01-01 RX ADMIN — PHENYLEPHRINE HYDROCHLORIDE 50 MCG: 10 INJECTION INTRAVENOUS at 13:42

## 2019-01-01 RX ADMIN — GADOBUTROL 5 ML: 604.72 INJECTION INTRAVENOUS at 06:25

## 2019-01-01 RX ADMIN — CARBOPLATIN 550 MG: 10 INJECTION, SOLUTION INTRAVENOUS at 13:29

## 2019-01-01 RX ADMIN — IPRATROPIUM BROMIDE AND ALBUTEROL SULFATE 3 ML: .5; 3 SOLUTION RESPIRATORY (INHALATION) at 12:17

## 2019-01-01 RX ADMIN — SODIUM CHLORIDE 250 ML: 9 INJECTION, SOLUTION INTRAVENOUS at 11:29

## 2019-01-01 RX ADMIN — PALONOSETRON HYDROCHLORIDE 0.25 MG: 0.25 INJECTION, SOLUTION INTRAVENOUS at 13:29

## 2019-01-01 RX ADMIN — CARBOPLATIN 500 MG: 10 INJECTION, SOLUTION INTRAVENOUS at 09:39

## 2019-01-01 RX ADMIN — SODIUM CHLORIDE 250 ML: 9 INJECTION, SOLUTION INTRAVENOUS at 07:58

## 2019-01-01 RX ADMIN — ATEZOLIZUMAB 1200 MG: 1200 INJECTION, SOLUTION INTRAVENOUS at 12:57

## 2019-01-01 RX ADMIN — PHENYLEPHRINE HYDROCHLORIDE 150 MCG: 10 INJECTION INTRAVENOUS at 13:33

## 2019-01-01 RX ADMIN — PALONOSETRON HYDROCHLORIDE 0.25 MG: 0.25 INJECTION, SOLUTION INTRAVENOUS at 09:47

## 2019-01-01 RX ADMIN — SODIUM CHLORIDE 60 ML: 9 INJECTION, SOLUTION INTRAVENOUS at 13:43

## 2019-01-01 RX ADMIN — IOPAMIDOL 73 ML: 755 INJECTION, SOLUTION INTRAVASCULAR at 10:59

## 2019-01-01 RX ADMIN — PHENYLEPHRINE HYDROCHLORIDE 50 MCG: 10 INJECTION INTRAVENOUS at 13:37

## 2019-01-01 RX ADMIN — SUGAMMADEX 200 MG: 100 INJECTION, SOLUTION INTRAVENOUS at 14:25

## 2019-01-01 RX ADMIN — SODIUM CHLORIDE 150 MG: 9 INJECTION, SOLUTION INTRAVENOUS at 09:59

## 2019-01-01 RX ADMIN — SODIUM CHLORIDE 250 ML: 9 INJECTION, SOLUTION INTRAVENOUS at 11:07

## 2019-01-01 RX ADMIN — SODIUM CHLORIDE, POTASSIUM CHLORIDE, SODIUM LACTATE AND CALCIUM CHLORIDE: 600; 310; 30; 20 INJECTION, SOLUTION INTRAVENOUS at 13:23

## 2019-01-01 RX ADMIN — PEGFILGRASTIM 6 MG: KIT SUBCUTANEOUS at 13:45

## 2019-01-01 RX ADMIN — SODIUM CHLORIDE 150 MG: 9 INJECTION, SOLUTION INTRAVENOUS at 09:02

## 2019-01-01 RX ADMIN — SODIUM CHLORIDE 150 MG: 9 INJECTION, SOLUTION INTRAVENOUS at 13:27

## 2019-01-01 RX ADMIN — PALONOSETRON HYDROCHLORIDE 0.25 MG: 0.25 INJECTION, SOLUTION INTRAVENOUS at 07:58

## 2019-01-01 RX ADMIN — PALONOSETRON HYDROCHLORIDE 0.25 MG: 0.25 INJECTION, SOLUTION INTRAVENOUS at 11:31

## 2019-01-01 RX ADMIN — DEXAMETHASONE SODIUM PHOSPHATE 6 MG: 4 INJECTION, SOLUTION INTRA-ARTICULAR; INTRALESIONAL; INTRAMUSCULAR; INTRAVENOUS; SOFT TISSUE at 13:30

## 2019-01-01 RX ADMIN — ATEZOLIZUMAB 1200 MG: 1200 INJECTION, SOLUTION INTRAVENOUS at 14:11

## 2019-01-01 RX ADMIN — ETOPOSIDE 150 MG: 20 INJECTION, SOLUTION, CONCENTRATE INTRAVENOUS at 11:56

## 2019-01-01 RX ADMIN — ONDANSETRON 4 MG: 2 INJECTION INTRAMUSCULAR; INTRAVENOUS at 14:25

## 2019-01-01 RX ADMIN — SODIUM CHLORIDE 250 ML: 9 INJECTION, SOLUTION INTRAVENOUS at 13:31

## 2019-01-01 RX ADMIN — PEGFILGRASTIM 6 MG: KIT SUBCUTANEOUS at 16:00

## 2019-01-01 RX ADMIN — SODIUM CHLORIDE 250 ML: 9 INJECTION, SOLUTION INTRAVENOUS at 09:47

## 2019-01-01 RX ADMIN — IOPAMIDOL 71 ML: 755 INJECTION, SOLUTION INTRAVENOUS at 16:10

## 2019-01-01 RX ADMIN — ATEZOLIZUMAB 1200 MG: 1200 INJECTION, SOLUTION INTRAVENOUS at 08:33

## 2019-01-01 RX ADMIN — PEGFILGRASTIM 6 MG: KIT SUBCUTANEOUS at 12:09

## 2019-01-01 RX ADMIN — SODIUM CHLORIDE 150 MG: 9 INJECTION, SOLUTION INTRAVENOUS at 11:46

## 2019-01-01 RX ADMIN — FENTANYL CITRATE 100 MCG: 50 INJECTION, SOLUTION INTRAMUSCULAR; INTRAVENOUS at 13:29

## 2019-01-01 RX ADMIN — PROPOFOL 150 MCG/KG/MIN: 10 INJECTION, EMULSION INTRAVENOUS at 13:30

## 2019-01-01 RX ADMIN — ETOPOSIDE 150 MG: 20 INJECTION, SOLUTION, CONCENTRATE INTRAVENOUS at 10:17

## 2019-01-01 RX ADMIN — ROCURONIUM BROMIDE 10 MG: 10 INJECTION INTRAVENOUS at 13:59

## 2019-01-01 RX ADMIN — FLUDEOXYGLUCOSE F-18 11.06 MCI.: 500 INJECTION, SOLUTION INTRAVENOUS at 15:31

## 2019-01-01 RX ADMIN — SODIUM CHLORIDE 150 MG: 9 INJECTION, SOLUTION INTRAVENOUS at 15:04

## 2019-01-01 RX ADMIN — SODIUM CHLORIDE 250 ML: 9 INJECTION, SOLUTION INTRAVENOUS at 12:37

## 2019-01-01 RX ADMIN — CARBOPLATIN 550 MG: 10 INJECTION, SOLUTION INTRAVENOUS at 14:32

## 2019-01-01 RX ADMIN — ATEZOLIZUMAB 1200 MG: 1200 INJECTION, SOLUTION INTRAVENOUS at 13:51

## 2019-01-01 RX ADMIN — IOPAMIDOL 72 ML: 755 INJECTION, SOLUTION INTRAVASCULAR at 10:54

## 2019-01-01 RX ADMIN — ATEZOLIZUMAB 1200 MG: 1200 INJECTION, SOLUTION INTRAVENOUS at 10:35

## 2019-01-01 RX ADMIN — ATEZOLIZUMAB 1200 MG: 1200 INJECTION, SOLUTION INTRAVENOUS at 13:54

## 2019-01-01 RX ADMIN — SODIUM CHLORIDE 150 MG: 9 INJECTION, SOLUTION INTRAVENOUS at 10:58

## 2019-01-01 RX ADMIN — ROCURONIUM BROMIDE 40 MG: 10 INJECTION INTRAVENOUS at 13:30

## 2019-01-01 RX ADMIN — SODIUM CHLORIDE 250 ML: 9 INJECTION, SOLUTION INTRAVENOUS at 11:30

## 2019-01-01 RX ADMIN — PROPOFOL 30 MG: 10 INJECTION, EMULSION INTRAVENOUS at 13:59

## 2019-01-01 RX ADMIN — IOPAMIDOL 59 ML: 755 INJECTION, SOLUTION INTRAVENOUS at 13:43

## 2019-01-01 RX ADMIN — SODIUM CHLORIDE 250 ML: 9 INJECTION, SOLUTION INTRAVENOUS at 15:32

## 2019-01-01 RX ADMIN — ETOPOSIDE 150 MG: 20 INJECTION, SOLUTION, CONCENTRATE INTRAVENOUS at 11:50

## 2019-01-01 RX ADMIN — PEGFILGRASTIM 6 MG: 6 INJECTION SUBCUTANEOUS at 14:33

## 2019-01-01 RX ADMIN — IPRATROPIUM BROMIDE AND ALBUTEROL SULFATE 3 ML: .5; 3 SOLUTION RESPIRATORY (INHALATION) at 14:58

## 2019-01-01 RX ADMIN — SODIUM CHLORIDE 150 MG: 9 INJECTION, SOLUTION INTRAVENOUS at 15:33

## 2019-01-01 RX ADMIN — IPRATROPIUM BROMIDE AND ALBUTEROL SULFATE 3 ML: .5; 3 SOLUTION RESPIRATORY (INHALATION) at 11:01

## 2019-01-01 RX ADMIN — PROPOFOL 150 MG: 10 INJECTION, EMULSION INTRAVENOUS at 13:29

## 2019-01-01 RX ADMIN — SODIUM CHLORIDE 250 ML: 9 INJECTION, SOLUTION INTRAVENOUS at 11:33

## 2019-01-01 RX ADMIN — SODIUM CHLORIDE 150 MG: 9 INJECTION, SOLUTION INTRAVENOUS at 08:03

## 2019-01-01 RX ADMIN — ATEZOLIZUMAB 1200 MG: 1200 INJECTION, SOLUTION INTRAVENOUS at 11:34

## 2019-01-01 RX ADMIN — MIDAZOLAM 2 MG: 1 INJECTION INTRAMUSCULAR; INTRAVENOUS at 13:23

## 2019-01-01 RX ADMIN — SODIUM CHLORIDE 150 MG: 9 INJECTION, SOLUTION INTRAVENOUS at 11:09

## 2019-01-01 RX ADMIN — SODIUM CHLORIDE 250 ML: 9 INJECTION, SOLUTION INTRAVENOUS at 13:27

## 2019-01-01 RX ADMIN — SODIUM CHLORIDE 250 ML: 9 INJECTION, SOLUTION INTRAVENOUS at 10:58

## 2019-01-01 RX ADMIN — LIDOCAINE HYDROCHLORIDE 100 MG: 20 INJECTION, SOLUTION INFILTRATION; PERINEURAL at 13:29

## 2019-01-01 RX ADMIN — SODIUM CHLORIDE 250 ML: 9 INJECTION, SOLUTION INTRAVENOUS at 09:01

## 2019-01-01 RX ADMIN — IPRATROPIUM BROMIDE AND ALBUTEROL SULFATE 3 ML: .5; 3 SOLUTION RESPIRATORY (INHALATION) at 13:31

## 2019-01-01 RX ADMIN — SODIUM CHLORIDE 150 MG: 9 INJECTION, SOLUTION INTRAVENOUS at 14:06

## 2019-01-01 RX ADMIN — SODIUM CHLORIDE 1000 ML: 9 INJECTION, SOLUTION INTRAVENOUS at 12:37

## 2019-01-01 RX ADMIN — SODIUM CHLORIDE 250 ML: 9 INJECTION, SOLUTION INTRAVENOUS at 11:09

## 2019-01-01 RX ADMIN — SODIUM CHLORIDE 150 MG: 9 INJECTION, SOLUTION INTRAVENOUS at 11:33

## 2019-01-01 ASSESSMENT — COPD QUESTIONNAIRES
COPD: 1
CAT_SEVERITY: MODERATE

## 2019-01-01 ASSESSMENT — ENCOUNTER SYMPTOMS
COUGH: 0
HEMATOCHEZIA: 0
NAUSEA: 0
DYSURIA: 0
ARTHRALGIAS: 1
CONSTIPATION: 0
WEAKNESS: 1
MYALGIAS: 1
PARESTHESIAS: 0
FREQUENCY: 0
HEMATURIA: 0
EYE PAIN: 0
DIARRHEA: 0
ABDOMINAL PAIN: 0
SHORTNESS OF BREATH: 1
FEVER: 0
HEARTBURN: 0
NERVOUS/ANXIOUS: 1
SORE THROAT: 0
CHILLS: 0
HEADACHES: 0
PALPITATIONS: 0
BREAST MASS: 0
DIZZINESS: 0

## 2019-01-01 ASSESSMENT — PAIN SCALES - GENERAL
PAINLEVEL: NO PAIN (0)
PAINLEVEL: MILD PAIN (2)
PAINLEVEL: NO PAIN (0)

## 2019-01-01 ASSESSMENT — MIFFLIN-ST. JEOR
SCORE: 1008.63
SCORE: 1004.25
SCORE: 1002.12
SCORE: 1012.1
SCORE: 992.14
SCORE: 993.96
SCORE: 989.55
SCORE: 975.94
SCORE: 999.86
SCORE: 994.09
SCORE: 989.1

## 2019-01-01 ASSESSMENT — LIFESTYLE VARIABLES: TOBACCO_USE: 1

## 2019-01-01 ASSESSMENT — ACTIVITIES OF DAILY LIVING (ADL): CURRENT_FUNCTION: NO ASSISTANCE NEEDED

## 2019-01-22 NOTE — TELEPHONE ENCOUNTER
Called patient to schedule procedure with Dr. Ajay Us, there was no answer.  Left message with my direct line [phone].    1/22/19 vs

## 2019-02-07 NOTE — TELEPHONE ENCOUNTER
Called patient to schedule procedure with Dr. Ajay Us, there was no answer.  Left message with my direct line [phone].

## 2019-05-16 NOTE — TELEPHONE ENCOUNTER
Patient has an appointment tomorrow and she is due to recheck her thyroid-order was already placed-routing to PCP to address after labs are back in case of dose change.    Rachael Guzman RN  Message handled by Nurse Triage.

## 2019-05-17 NOTE — TELEPHONE ENCOUNTER
TC from pt's PCP office stating that pt is in clinic today and stated she would like to proceed with the lung bx that was recommended but not done in 12/2018. She is requesting a call from the surgical team to get scheduled for a consult. Message routed to thoracic surgical team.

## 2019-05-17 NOTE — PROGRESS NOTES
"leSUBJECTIVE:   Amberly Palafox is a 74 year old female who presents for Preventive Visit.    Are you in the first 12 months of your Medicare coverage?  No    Concerns today: wants to discuss PET scan    Healthy Habits:     In general, how would you rate your overall health?  Fair    Frequency of exercise:  2-3 days/week    Duration of exercise:  15-30 minutes    Do you usually eat at least 4 servings of fruit and vegetables a day, include whole grains    & fiber and avoid regularly eating high fat or \"junk\" foods?  No    Taking medications regularly:  Yes    Medication side effects:  Muscle aches    Ability to successfully perform activities of daily living:  No assistance needed    Home Safety:  No safety concerns identified    Hearing Impairment:  Need to ask people to speak up or repeat themselves    In the past 6 months, have you been bothered by leaking of urine? Yes    In general, how would you rate your overall mental or emotional health?  Good      PHQ-2 Total Score: 2    Additional concerns today:  Yes    1. Lung mass: patient abnormal CT screening for lung cancer 9/2018 that showed a 3.5 x 2.9 cm left upper lobe mass concerning for malignancy. Patient agreed to have PET scan done 12/2018 which showed a hypermetabolic lobulated mass in the left upper lobe concerning for malignancy as well as some hypermetabolic lymph nodes  In left hilum. It was recommended that she have and EBUS and Super D done in the OR with Dr. Us. Patient was not ready to have this done at that time. She has been in denial that there is a mass there. She only told 2 friends and none of her family. Her father previously told her to never have surgery for something like this and one friend indicated she would never have radiation or chemotherapy so the patient did not have anything done. She now will be moving to [place] in June after selling her house. One daughter already lives down there and the other is moving down " there. Feels she should find out what is going on and wants to know what her options are. Very concerned about morbidity with treatment.     She notes she mostly feels well. Has felt a little more SOB, especially with climbing stairs. No pain. A little more tired, but has a lot of stress right now.    2. COPD: unsure if spiriva helps. Should probably use the albuterol more often. Does help when she uses it.     3. Cholesterol/CAD: patient with very high calcium score on cardiac CT last year. Completed an exercise stress test that was normal. Started her on a statin for elevated ASCVD risk score and high calcium score. Feels she is more achy and crampy. Not sure if due to the medication.    Do you feel safe in your environment? Yes    Do you have a Health Care Directive? No: Advance care planning reviewed with patient; information given to patient to review.    Fall risk  Fallen 2 or more times in the past year?: No  Any fall with injury in the past year?: No    Cognitive Screening   1) Repeat 3 items (Leader, Season, Table)    2) Clock draw: NORMAL  3) 3 item recall: Recalls 2 objects   Results: NORMAL clock, 1-2 items recalled: COGNITIVE IMPAIRMENT LESS LIKELY    Mini-CogTM Copyright S Santiago. Licensed by the author for use in [facility]; reprinted with permission (noa@.[facility]). All rights reserved.      Do you have sleep apnea, excessive snoring or daytime drowsiness?: no    Reviewed and updated as needed this visit by clinical staff  Tobacco  Allergies  Meds  Problems  Med Hx  Surg Hx  Fam Hx  Soc Hx        Reviewed and updated as needed this visit by Provider  Tobacco  Allergies  Meds  Problems  Med Hx  Surg Hx  Fam Hx        Social History     Tobacco Use     Smoking status: Current Every Day Smoker     Packs/day: 0.50     Years: 50.00     Pack years: 25.00     Types: Cigarettes     Smokeless tobacco: Never Used   Substance Use Topics     Alcohol use: No     Alcohol Use 5/17/2019    Prescreen: >3 drinks/day or >7 drinks/week? No   Prescreen: >3 drinks/day or >7 drinks/week? -       Current providers sharing in care for this patient include:   Patient Care Team:  Luz Lopez MD as PCP - General (Internal Medicine)  Luz Lopez MD as Assigned PCP    The following health maintenance items are reviewed in Epic and correct as of today:  Health Maintenance   Topic Date Due     COPD ACTION PLAN Q1 YR  1945     ADVANCE DIRECTIVE PLANNING Q5 YRS  03/16/2002     ZOSTER IMMUNIZATION (2 of 3) 12/03/2012     PHQ-2  01/01/2019     TSH Q1 YEAR  08/22/2019     MEDICARE ANNUAL WELLNESS VISIT  08/22/2019     FALL RISK ASSESSMENT  08/22/2019     LIPID MONITORING Q1 YEAR  08/22/2019     LUNG CANCER SCREENING ANNUAL  09/25/2019     DEXA Q3 YR  07/11/2020     MAMMO SCREEN Q2 YR (SYSTEM ASSIGNED)  09/26/2020     COLON CANCER SCREEN (SYSTEM ASSIGNED)  11/01/2021     DTAP/TDAP/TD IMMUNIZATION (3 - Td) 09/10/2024     SPIROMETRY ONETIME  Completed     DEXA SCAN SCREENING (SYSTEM ASSIGNED)  Completed     INFLUENZA VACCINE  Completed     PNEUMOCOCCAL IMMUNIZATION 65+ LOW/MEDIUM RISK  Completed     HEPATITIS C SCREENING  Completed     IPV IMMUNIZATION  Aged Out     MENINGITIS IMMUNIZATION  Aged Out     Patient Active Problem List   Diagnosis     Osteopenia     Hypothyroidism     Mixed hyperlipidemia     COPD (chronic obstructive pulmonary disease) (H)     Tobacco abuse     Lung nodule     Past Surgical History:   Procedure Laterality Date     no surgeries         Social History     Tobacco Use     Smoking status: Current Every Day Smoker     Packs/day: 0.50     Years: 50.00     Pack years: 25.00     Types: Cigarettes     Smokeless tobacco: Never Used   Substance Use Topics     Alcohol use: No     Family History   Problem Relation Age of Onset     Thyroid Disease Mother         hypothyroid     Heart Disease Mother         s/p stents     Hypertension Mother      Thyroid Disease Daughter          "hypothyroid     Heart Disease Maternal Grandmother 94     Lupus Sister          Review of Systems   Constitutional: Negative for chills and fever.   HENT: Negative for congestion, ear pain, hearing loss and sore throat.    Eyes: Negative for pain and visual disturbance.   Respiratory: Positive for shortness of breath. Negative for cough.    Cardiovascular: Negative for chest pain and palpitations.   Gastrointestinal: Negative for abdominal pain, constipation, diarrhea, heartburn, hematochezia and nausea.   Breasts:  Negative for tenderness, breast mass and discharge.   Genitourinary: Negative for dysuria, frequency, genital sores, hematuria, pelvic pain, urgency, vaginal bleeding and vaginal discharge.   Musculoskeletal: Positive for arthralgias and myalgias.   Skin: Negative for rash.   Neurological: Positive for weakness. Negative for dizziness, headaches and paresthesias.   Psychiatric/Behavioral: Positive for mood changes. The patient is nervous/anxious.    All other systems reviewed and are negative.    OBJECTIVE:   /66 (BP Location: Right arm, Cuff Size: Adult Regular)   Pulse 87   Temp 98.6  F (37  C) (Tympanic)   Ht 1.575 m (5' 2\")   Wt 54.9 kg (121 lb)   SpO2 97%   BMI 22.13 kg/m   Estimated body mass index is 22.13 kg/m  as calculated from the following:    Height as of this encounter: 1.575 m (5' 2\").    Weight as of this encounter: 54.9 kg (121 lb).  Physical Exam  GENERAL: healthy, alert and no distress  EYES: Eyes grossly normal to inspection, PERRL and conjunctivae and sclerae normal  HENT: ear canals and TM's normal, nose and mouth without ulcers or lesions  NECK: no adenopathy, no asymmetry, masses, or scars and thyroid normal to palpation  RESP: lungs clear to auscultation - no rales, rhonchi or wheezes  BREAST: declined  CV: regular rate and rhythm, normal S1 S2, no S3 or S4, no murmur, click or rub, no peripheral edema and peripheral pulses strong  ABDOMEN: soft, nontender, no " hepatosplenomegaly, no masses and bowel sounds normal  MS: no gross musculoskeletal defects noted, no edema  SKIN: no suspicious lesions or rashes  NEURO: Normal strength and tone, mentation intact and speech normal  PSYCH: mentation appears normal, affect normal/bright    Diagnostic Test Results:  Results for orders placed or performed in visit on 05/17/19   Lipid panel reflex to direct LDL Fasting   Result Value Ref Range    Cholesterol 178 <200 mg/dL    Triglycerides 114 <150 mg/dL    HDL Cholesterol 43 (L) >49 mg/dL    LDL Cholesterol Calculated 112 (H) <100 mg/dL    Non HDL Cholesterol 135 (H) <130 mg/dL   Comprehensive metabolic panel   Result Value Ref Range    Sodium 140 133 - 144 mmol/L    Potassium 4.4 3.4 - 5.3 mmol/L    Chloride 106 94 - 109 mmol/L    Carbon Dioxide 27 20 - 32 mmol/L    Anion Gap 7 3 - 14 mmol/L    Glucose 97 70 - 99 mg/dL    Urea Nitrogen 12 7 - 30 mg/dL    Creatinine 0.58 0.52 - 1.04 mg/dL    GFR Estimate >90 >60 mL/min/[1.73_m2]    GFR Estimate If Black >90 >60 mL/min/[1.73_m2]    Calcium 8.9 8.5 - 10.1 mg/dL    Bilirubin Total 0.5 0.2 - 1.3 mg/dL    Albumin 3.5 3.4 - 5.0 g/dL    Protein Total 8.2 6.8 - 8.8 g/dL    Alkaline Phosphatase 74 40 - 150 U/L    ALT 17 0 - 50 U/L    AST 13 0 - 45 U/L   TSH   Result Value Ref Range    TSH 3.01 0.40 - 4.00 mU/L   T4, free   Result Value Ref Range    T4 Free 1.58 (H) 0.76 - 1.46 ng/dL   CRP inflammation   Result Value Ref Range    CRP Inflammation 21.0 (H) 0.0 - 8.0 mg/L   Erythrocyte sedimentation rate auto   Result Value Ref Range    Sed Rate 76 (H) 0 - 30 mm/h   CBC with platelets differential   Result Value Ref Range    WBC 4.9 4.0 - 11.0 10e9/L    RBC Count 3.91 3.8 - 5.2 10e12/L    Hemoglobin 12.1 11.7 - 15.7 g/dL    Hematocrit 37.3 35.0 - 47.0 %    MCV 95 78 - 100 fl    MCH 30.9 26.5 - 33.0 pg    MCHC 32.4 31.5 - 36.5 g/dL    RDW 13.3 10.0 - 15.0 %    Platelet Count 286 150 - 450 10e9/L    % Neutrophils 76.5 %    % Lymphocytes 17.1 %    %  Monocytes 5.8 %    % Eosinophils 0.4 %    % Basophils 0.2 %    Absolute Neutrophil 3.7 1.6 - 8.3 10e9/L    Absolute Lymphocytes 0.8 0.8 - 5.3 10e9/L    Absolute Monocytes 0.3 0.0 - 1.3 10e9/L    Absolute Eosinophils 0.0 0.0 - 0.7 10e9/L    Absolute Basophils 0.0 0.0 - 0.2 10e9/L    Diff Method Automated Method    Lactate Dehydrogenase   Result Value Ref Range    Lactate Dehydrogenase 230 81 - 234 U/L   Magnesium   Result Value Ref Range    Magnesium 2.2 1.6 - 2.3 mg/dL       ASSESSMENT / PLAN:   1. Routine history and physical examination of adult  Colonoscopy UTD  Mammo/Dexa due in 2020  tdap UTD  Pneumonia vaccines UTD  Did not have time to discuss Shingrix today. Had Zostavax.    2. Lung mass  Brought up images and reviewed at depth with patient. This is very likely a malignancy. I cannot tell her options for treatment or survival without knowing what type of tumor this is. She is very concerned about morbidity of treatment and indicates quality of life is much more important to her than length of time. Discussed doing nothing will almost certainly result in a growing/spreading tumor that would eventually cause symptoms. This is her decision, but would be helpful to know what to expect and to be prepared to manage symptoms if she chooses this option. Encouraged her to talk with her family about this as well. She would like to proceed with biopsy before she leaves and hopefully get a consultation about options for treatment as well so she knows more what she should expect and what her options are. This will help with planning with the move. I will contact the team who reviewed her previous case and see if they need more imaging or if they are able to schedule her for the procedure without further imaging given delay in sheduling. Discussed with patient she will need someone to take her to the procedure. Labs look ok. Elevated inflammatory markers, as expected. These were checked as patient has had fluctuating  inflammatory markers in the past and she requested to recheck.   - CRP inflammation  - Erythrocyte sedimentation rate auto  - CBC with platelets differential  - Lactate Dehydrogenase    3. Chronic obstructive pulmonary disease, unspecified COPD type (H)  Has increased SOB. Unclear if this is due to her COPD or lung mass. Encouraged smoking cessation. Continue spiriva and albuterol as needed. O2 sats are ok.   - albuterol (PROAIR HFA/PROVENTIL HFA/VENTOLIN HFA) 108 (90 Base) MCG/ACT inhaler; Inhale 2 puffs into the lungs every 4 hours as needed for shortness of breath / dyspnea  Dispense: 18 g; Refill: 3    4. Hypothyroidism due to Hashimoto's thyroiditis  Labs show TSH ok, but free T4 slightly high. Free T4 was slightly high on last check as well. Would consider reducing her dose, but am concerned about doing this so close to when she is planning to move and not having access to follow-up so soon. I will continue her current dose and recommend rechecking her levels in about 3 months.  - TSH  - T4, free    5. Mixed hyperlipidemia  Cholesterol improved from before starting, but higher than last check. Not taking every day due to concern for side effects. Suggested she stop the medication for about 2 weeks and see if her myalgias resolve. If they resolve, we could try switching to Crestor. If they do not resolve, would favor restarting as she had a very high calcium score on her CT scan, although could consider not restarting if she does not treat her lung mass or if poor predicted survival once pathology known.  - Lipid panel reflex to direct LDL Fasting    6. Leg cramps  Labs ok. Encouraged to drink more water.  - CBC with platelets differential  - Magnesium    7. Myalgia  Could be from statin. Will try stopping as above. Encouraged better hydration.    8. Vitamin D deficiency  Has been low in past - will recheck  - Vitamin D Deficiency    9. Screening for diabetes mellitus  - Comprehensive metabolic panel    End of  "Life Planning:  Patient currently has an advanced directive: this needs to be readdressed once final diagnosis with lung mass made. Patient indicates quality of life more important to her than quantity of days.    COUNSELING:  Reviewed preventive health counseling, as reflected in patient instructions  Special attention given to:       Regular exercise       Healthy diet/nutrition    Estimated body mass index is 22.13 kg/m  as calculated from the following:    Height as of this encounter: 1.575 m (5' 2\").    Weight as of this encounter: 54.9 kg (121 lb).    Weight management plan noted, stable and monitoring     reports that she has been smoking cigarettes.  She has a 25.00 pack-year smoking history. She has never used smokeless tobacco.  Tobacco Cessation Action Plan: Information offered: Patient not interested at this time    Appropriate preventive services were discussed with this patient, including applicable screening as appropriate for cardiovascular disease, diabetes, osteopenia/osteoporosis, and glaucoma.  As appropriate for age/gender, discussed screening for colorectal cancer, prostate cancer, breast cancer, and cervical cancer. Checklist reviewing preventive services available has been given to the patient.    Reviewed patients plan of care and provided an AVS. The Complex Care Plan (for patients with higher acuity and needing more deliberate coordination of services) for Amberly meets the Care Plan requirement. This Care Plan has been established and reviewed with the Patient.    Counseling Resources:  ATP IV Guidelines  Pooled Cohorts Equation Calculator  Breast Cancer Risk Calculator  FRAX Risk Assessment  ICSI Preventive Guidelines  Dietary Guidelines for Americans, 2010  USDA's MyPlate  ASA Prophylaxis  Lung CA Screening    Luz Lopez MD  [facility]    Identified Health Risks:  "

## 2019-05-17 NOTE — TELEPHONE ENCOUNTER
Per provider patient is in clinic today [place] and wanting to proceed with biopsy for lung nodules noted on imaging in December.    Notes indicate bronchial biopsy procedure.    Can schedule biopsy and proceed or due to time lapse do we need to have any imaging repeated?    Contacted Casandra in Triage at [facility] [phone] and she will send a message to thoracic surgery team.  She advised they will place any need orders and contact patient and assist in scheduling.    Luz MEDINA RN - Triage  [facility]

## 2019-05-17 NOTE — PATIENT INSTRUCTIONS
1. Labs today: blood counts, liver function, kidney function, thyroid function, vitamin D, LDH (marker of cell turn over), inflammatory tests, cholesterol and magnesium  2. We will contact the lung nodule clinic and figure out next steps  3. Refilled medications

## 2019-05-31 NOTE — TELEPHONE ENCOUNTER
Called patient to schedule procedure with Dr. Ajay Us, there was no answer.  Left message with my direct line [phone].      Holding Surgery Spot on 06/07 with Dr. Us.

## 2019-05-31 NOTE — TELEPHONE ENCOUNTER
Spoke with patient to schedule procedure with Dr. Ajay Us    Procedure was scheduled on 06/07 at [facility] OR  Patient will have H&P with PCP  Patient is aware a / is needed day of surgery.   Surgery letter was sent via [employer], patient has my direct contact information for any further questions.

## 2019-05-31 NOTE — TELEPHONE ENCOUNTER
Reason for call:  Same Day Appointment   Requested Provider: Dr Lopez    PCP: [unfilled]    Reason for visit: pre op    Duration of symptoms: surgery scheduled for 06/07/2019, will not see any other provider    Have you been treated for this in the past? No    Additional comments: none      Phone number to reach patient:  Home number on file [phone] (home)    Best Time:  anytime    Can we leave a detailed message on this number?  YES

## 2019-06-05 NOTE — PATIENT INSTRUCTIONS
Before Your Surgery      Call your surgeon if there is any change in your health. This includes signs of a cold or flu (such as a sore throat, runny nose, cough, rash or fever).    Do not smoke, drink alcohol or take over the counter medicine (unless your surgeon or primary care doctor tells you to) for the 24 hours before and after surgery.    If you take prescribed drugs: Follow your doctor s orders about which medicines to take and which to stop until after surgery.   You should take your spiriva before the surgery and you can also take your albuterol.  You should skip your levothyroxine that morning.    Eating and drinking prior to surgery: follow the instructions from your surgeon    Take a shower or bath the night before surgery. Use the soap your surgeon gave you to gently clean your skin. If you do not have soap from your surgeon, use your regular soap. Do not shave or scrub the surgery site.  Wear clean pajamas and have clean sheets on your bed.     We can talk about strategies for stopping smoking after the biopsy.

## 2019-06-05 NOTE — PROGRESS NOTES
[facility] [place]  [address]  [address]  [place] [address] [phone]  Dept: [phone]    PRE-OP EVALUATION:  Today's date: 2019    Amberly Palafox (: 1945) presents for pre-operative evaluation assessment as requested by Dr. Us.  She requires evaluation and anesthesia risk assessment prior to undergoing surgery/procedure for treatment of Bronchoscopy .    Proposed Surgery/ Procedure: Bronchoscopy  Date of Surgery/ Procedure: 19  Time of Surgery/ Procedure: 1000  Hospital/Surgical Facility: [facility]    Primary Physician: Luz Lopez  Type of Anesthesia Anticipated: General    Patient has a Health Care Directive or Living Will:  NO    1. NO - Do you have a history of heart attack, stroke, stent, bypass or surgery on an artery in the head, neck, heart or legs?  2. YES - DO YOU EVER HAVE ANY PAIN OR DISCOMFORT IN YOUR CHEST? occasionally  3. NO - Do you have a history of  Heart Failure?  4. NO - Are you troubled by shortness of breath when: walking on the level, up a slight hill or at night?  5. NO - Do you currently have a cold, bronchitis or other respiratory infection?  6. YES - DO YOU HAVE A COUGH, SHORTNESS OF BREATH OR WHEEZING? cough  7. NO - Do you sometimes get pains in the calves of your legs when you walk?  8. NO - Do you or anyone in your family have previous history of blood clots?  9. NO - Do you or does anyone in your family have a serious bleeding problem such as prolonged bleeding following surgeries or cuts?  10. YES - HAVE YOU EVER HAD PROBLEMS WITH ANEMIA OR BEEN TOLD TO TAKE IRON PILLS? Past, long time ago  11. NO - Have you had any abnormal blood loss such as black, tarry or bloody stools, or abnormal vaginal bleeding?  12. NO - Have you ever had a blood transfusion?  13. NO - Have you or any of your relatives ever had problems with anesthesia?  14. NO - Do you have sleep apnea, excessive snoring or daytime drowsiness?  15. NO -  Do you have any prosthetic heart valves?  16. NO - Do you have prosthetic joints?  17. NO - Is there any chance that you may be pregnant?    Notes that she will occasionally have chest discomfort on the right as well as back pain when she is walking and moving around that has been ongoing for months.  She describes the discomfort as being discomfort more than pain and noting that it was like dirt.  She does not think that the pain is related to exercise.    She continues to smoke 10 cigarettes per day.  She notes that she will get short of breath when walking long distances[place] the airport.  She continues to take spiriva and albuterol daily    She recently stopped taking her statin after having leg cramping and noticed that her symptoms have improved     HPI:     HPI related to upcoming procedure: Roxana was found to have a mass on screening CT.  Initially she was not interested in further work up.  She has since undergone an additional CT as well as a PET scan which show worsening of the lung mass.  She is planning to go for biopsy.      See problem list for active medical problems.  Problems all longstanding and stable, except as noted/documented.  See ROS for pertinent symptoms related to these conditions.    MEDICAL HISTORY:     Patient Active Problem List    Diagnosis Date Noted     Lung nodule 09/25/2018     Priority: Medium     Referred to [facility] by [facility] Team.       COPD (chronic obstructive pulmonary disease) (H) 03/27/2013     Priority: Medium     Tobacco abuse 03/27/2013     Priority: Medium     Osteopenia 03/04/2013     Priority: Medium     Hypothyroidism 03/04/2013     Priority: Medium     Mixed hyperlipidemia 03/04/2013     Priority: Medium      Past Medical History:   Diagnosis Date     Symptomatic menopausal or female climacteric states      Unspecified hypothyroidism      Past Surgical History:   Procedure Laterality Date     no surgeries       Current  "Outpatient Medications   Medication Sig Dispense Refill     varenicline (CHANTIX CONTINUING MONTH RANDAL) 1 MG tablet Take 1 tablet (1 mg) by mouth 2 times daily 60 tablet 1     varenicline (CHANTIX RANDAL) 0.5 MG X 11 & 1 MG X 42 tablet Take 0.5 mg tab daily for 3 days, THEN 0.5 mg tab twice daily for 4 days, THEN 1 mg twice daily. 53 tablet 0     albuterol (PROAIR HFA/PROVENTIL HFA/VENTOLIN HFA) 108 (90 Base) MCG/ACT inhaler Inhale 2 puffs into the lungs every 4 hours as needed for shortness of breath / dyspnea 18 g 3     atorvastatin (LIPITOR) 20 MG tablet Take 1 tablet (20 mg) by mouth daily 90 tablet 3     B Complex-C (SUPER B COMPLEX PO) Take 1 tablet by mouth daily       cholecalciferol (VITAMIN D3) 5000 units CAPS capsule Take by mouth daily       FISH OIL        levothyroxine (SYNTHROID/LEVOTHROID) 175 MCG tablet Take 1 tablet (175 mcg) by mouth daily 90 tablet 3     Multiple Vitamins-Minerals (MULTIVITAMIN OR) Take  by mouth.       tiotropium (SPIRIVA RESPIMAT) 2.5 MCG/ACT inhaler Inhale 2 puffs into the lungs daily Please dispense 3 months supply 12 g 3     OTC products: None, except as noted above    No Known Allergies   Latex Allergy: NO    Social History     Tobacco Use     Smoking status: Current Every Day Smoker     Packs/day: 0.50     Years: 50.00     Pack years: 25.00     Types: Cigarettes     Smokeless tobacco: Never Used   Substance Use Topics     Alcohol use: No     History   Drug Use No       REVIEW OF SYSTEMS:   Constitutional, neuro, ENT, endocrine, pulmonary, cardiac, gastrointestinal, genitourinary, musculoskeletal, integument and psychiatric systems are negative, except as otherwise noted.    EXAM:   /72 (BP Location: Right arm, Patient Position: Sitting, Cuff Size: Adult Regular)   Pulse 87   Temp 99.4  F (37.4  C) (Tympanic)   Ht 1.575 m (5' 2\")   Wt 53.7 kg (118 lb 4.8 oz)   SpO2 97%   BMI 21.64 kg/m      GENERAL APPEARANCE: healthy, alert and no distress.  Intermittently " tearful     EYES: EOMI, PERRL, conjunctivae and sclera normal     HENT: ear canals and TM's normal and nose and mouth without ulcers or lesions     NECK: no adenopathy, no asymmetry, masses, or scars and thyroid normal to palpation     RESP: lungs clear to auscultation - no rales, rhonchi or wheezes     CV: regular rates and rhythm, normal S1 S2, no S3 or S4 and no murmur, click or rub     ABDOMEN:  soft, nontender, no HSM or masses and bowel sounds normal     MS: extremities normal- no gross deformities noted, no evidence of inflammation in joints     SKIN: no suspicious lesions or rashes     NEURO: Normal strength and tone, sensory exam grossly normal, mentation intact and speech normal     PSYCH: mentation appears normal. Tearful when discussing possibility of cancer.     LYMPHATICS: No cervical adenopathy    DIAGNOSTICS:   EKG: appears normal, NSR, normal axis, normal intervals, no acute ST/T changes c/w ischemia, no LVH by voltage criteria    Recent Labs   Lab Test 05/17/19  1018 05/29/18  0921  07/22/13  1037   HGB 12.1  --   --  13.0     --   --  262    136   < > 143   POTASSIUM 4.4 4.0   < > 4.1   CR 0.58 0.55   < > 0.52    < > = values in this interval not displayed.        IMPRESSION:   Reason for surgery/procedure: Left lung mass concerning for malignancy.  Plan for biopsy to obtain tissue for diagnosis.  Diagnosis/reason for consult: Current every day smoker with COPD, gene-operative management     The proposed surgical procedure is considered INTERMEDIATE risk.    REVISED CARDIAC RISK INDEX  The patient has the following serious cardiovascular risks for perioperative complications such as (MI, PE, VFib and 3  AV Block):  No serious cardiac risks  INTERPRETATION: 0 risks: Class I (very low risk - 0.4% complication rate)    The patient has the following additional risks for perioperative complications:  No identified additional risks      ICD-10-CM    1. Preop general physical exam Z01.818  EKG 12-lead complete w/read - Clinics   2. Lung mass R91.8    3. Tobacco abuse Z72.0 varenicline (CHANTIX RANDAL) 0.5 MG X 11 & 1 MG X 42 tablet     varenicline (CHANTIX CONTINUING MONTH RANDAL) 1 MG tablet   4. Mixed hyperlipidemia E78.2    5. Elevated coronary artery calcium score R93.1        RECOMMENDATIONS:     Pulmonary Risk  Incentive spirometry post op  Respiratory Therapy (Respiratory Care IP Consult)  post op  NG tube decompression if abdominal distension or significant vomiting     --Patient is to take all scheduled medications on the day of surgery EXCEPT for modifications listed below:   - Do not take atorvastatin or levothyroxine on the day of surgery.    APPROVAL GIVEN to proceed with proposed procedure, without further diagnostic evaluation       Signed Electronically by: Josiane Edwards MD    Copy of this evaluation report is provided to requesting physician.    [facility] Preop Guidelines    Revised Cardiac Risk Index    -------------------------------------  Staff note:  I have seen this patient and examined him in the presence of Dr. Edwards.  I was present during the key components of the presenting complaints, physical exam, diagnosis, and plan, and fully concur with the plan as listed below above in the resident's note. Patient would like to quit smoking with chantix after surgery. Will plan to continue off of atorvastatin and consider trial of another statin in future due to elevated calcium score depending on results of biopsy and overall prognosis.    Luz Lopez MD  Internal Medicine/Pediatrics

## 2019-06-07 NOTE — PROCEDURES
INTERVENTIONAL PULMONOLOGY     Procedure(s):    A flexible bronchoscopy  Airway exam  EBUS-TBNA (1 sites)  Radial EBUS Navigation (1 sites)  Transbronchial forcep biopsies (1 sites)    Indication:  Lung mas    Attending of Record:  Ajay Us MD    Interventional Pulmonary Fellow   Jose Luis Lu MD     Trainees Present:   Prudencio Rice MD    Medications:    General Anesthesia - See anesthesia flowsheet for details    Sedation Time:   Per Anesthesia Care Provider    Time Out:  Performed    The patient's medical record has been reviewed.  The indication for the procedure was reviewed.  The necessary history and physical examination was performed and reviewed.  The risks, benefits and alternatives of the procedure were discussed with the the patient in detail and she had the opportunity to ask questions.  I discussed in particular the potential complications including risks of minor or life-threatening bleeding and/or infection, respiratory failure, vocal cord trauma / paralysis, pneumothorax, and discomfort. Sedation risks were also discussed including abnormal heart rhythms, low blood pressure, and respiratory failure. All questions were answered to the best of my ability.  Verbal and written informed consent was obtained.  The proposed procedure and the patient's identification were verified prior to the procedure by the physician and the surgical team.    The patient was assessed for the adequacy for the procedure and to receive medications.   Mental Status:  Alert and oriented x 3  Airway examination:  Class III (Visualization of only the base of uvula)  Pulmonary:  Decreased breathsound throughout  CV:  RRR, no murmurs or gallops  ASA Grade:  (II)  Mild systemic disease    After clinical evaluation and reviewing the indication, risks, alternatives and benefits of the procedure the patient was deemed to be in satisfactory condition to undergo the procedure.           A Tuberculosis risk assessment was  performed:  This patient has no known RISK of Tuberculosis    The procedure was performed in a negative airflow room: The patient could not be moved to a negative airflow room because of needed OR for the procedure    Maneuvers / Procedure:      A Flexible  bronchoscope was used for the procedure. The patient was orally intubated with a # 8.5 ETT and the flexible scope was passed through.    Airway Examination: A complete airway examination was performed from the distal trachea to the subsegmental level in each lobe of both lungs.  Pertinent findings include no endobronchial lesion.       EBUS-TBNA: The EBUS scope was inserted and biopsies were obtained from Station 11L with 7 passes, 7 samples obtained with CLAUDE present, a combination of suction and no suction was used to obtain the samples. EBUS samples were sent for cytology. To note, we routinely visualize 12 stations bilaterally and LN's that are not listed above were still examined but not biopsied due to axial diameter <5cm, higher LN staging, and/or inability to tolerate the procedure.   Radial EBUS navigation: The 1.8mm 20MHz radial probe was inserted into the bronchoscope and used to navigate to the lesion. The lesion was concentric by ultrasound and fluoroscopy was not used to confirm placement of the probe. Epinephrine was administered and tissue sampling was obtained using biopsy forceps. CLAUDE was present  Transbronchial biopsies: One Site - The bronchoscope was inserted into the ART Apical-Posterior.  Epinephrine was administered.  The biopsy forceps were then advanced with out fluoroscopic guidance.  A total of 5 biopsies were obtained.     Any disposable equipment was visually inspected and deemed to be intact immediately post procedure.      Recommendations:   -->  We will follow up with pathology results.      Jose Luis Lu MD  Interventional Pulmonary  Department of Pulmonary, Allergy, Critical Care and Sleep Medicine   [facility],  [facility]    Luz MONTANO, OCN  Clinical Nurse Specialist  Department of Thoracic Surgery  Office: [phone]  Email: [email or web address]    Tess Snow  Interventional Pulmonology Surgery Scheduler  Office: [phone]  Email: [email or web address]      I was present with the patient, Amberly Palafox, for the entire viewing portion of the bronchscopy procedure (including scope insertion and withdrawal) and agree with the interpretation and report as documented by Dr. Lu.   Ajay Us MD

## 2019-06-07 NOTE — ANESTHESIA PREPROCEDURE EVALUATION
Anesthesia Pre-Procedure Evaluation    Patient: Amberly Palafox   MRN:     [id number] Gender:   female   Age:    74 year old :      1945        Preoperative Diagnosis: Lung Nodule   Procedure(s):  Radial Endobronchial Ultrasound  Endobronchial And Transbronchial Biopsies     Past Medical History:   Diagnosis Date     Symptomatic menopausal or female climacteric states      Unspecified hypothyroidism       Past Surgical History:   Procedure Laterality Date     no surgeries            Anesthesia Evaluation     . Pt has had prior anesthetic. Type: General and MAC    No history of anesthetic complications          ROS/MED HX    ENT/Pulmonary:     (+)tobacco use (0.5 ppd for 50 years), Current use moderate COPD, , . .    Neurologic:  - neg neurologic ROS     Cardiovascular:     (+) Dyslipidemia, ----. : . . . :. . Previous cardiac testing date:results:date: results:ECG reviewed date:19 results:NSR @ 94/min. date: results:          METS/Exercise Tolerance:     Hematologic:  - neg hematologic  ROS       Musculoskeletal:  - neg musculoskeletal ROS       GI/Hepatic:         Renal/Genitourinary:  - ROS Renal section negative       Endo:     (+) thyroid problem hypothyroidism, .      Psychiatric:  - neg psychiatric ROS       Infectious Disease:  - neg infectious disease ROS       Malignancy:      - no malignancy   Other:    - neg other ROS                     PHYSICAL EXAM:   Mental Status/Neuro: A/A/O   Airway: Facies: Feasible  Mallampati: I  Mouth/Opening: Full  TM distance: > 6 cm  Neck ROM: Full   Respiratory: Auscultation: CTAB     Resp. Rate: Normal     Resp. Effort: Normal      CV: Rhythm: Regular  Rate: Age appropriate  Heart: Normal Sounds   Comments:      Dental:  Dental Comments: Upper and lower partials                Lab Results   Component Value Date    WBC 4.9 2019    HGB 12.1 2019    HCT 37.3 2019     2019    CRP 21.0 (H) 2019    SED 76 (H) 2019    NA  "140 05/17/2019    POTASSIUM 4.4 05/17/2019    CHLORIDE 106 05/17/2019    CO2 27 05/17/2019    BUN 12 05/17/2019    CR 0.58 05/17/2019    GLC 97 05/17/2019    JASPAL 8.9 05/17/2019    MAG 2.2 05/17/2019    ALBUMIN 3.5 05/17/2019    PROTTOTAL 8.2 05/17/2019    ALT 17 05/17/2019    AST 13 05/17/2019    ALKPHOS 74 05/17/2019    BILITOTAL 0.5 05/17/2019    LIPASE 53 03/04/2013    TSH 3.01 05/17/2019    T4 1.58 (H) 05/17/2019    T3 76 07/22/2013       Preop Vitals  BP Readings from Last 3 Encounters:   06/07/19 151/87   06/05/19 146/72   05/17/19 122/66    Pulse Readings from Last 3 Encounters:   06/07/19 93   06/05/19 87   05/17/19 87      Resp Readings from Last 3 Encounters:   06/07/19 16   03/04/13 16    SpO2 Readings from Last 3 Encounters:   06/07/19 97%   06/05/19 97%   05/17/19 97%      Temp Readings from Last 1 Encounters:   06/07/19 37.2  C (98.9  F) (Oral)    Ht Readings from Last 1 Encounters:   06/07/19 1.575 m (5' 2\")      Wt Readings from Last 1 Encounters:   06/07/19 54.1 kg (119 lb 4.3 oz)    Estimated body mass index is 21.81 kg/m  as calculated from the following:    Height as of this encounter: 1.575 m (5' 2\").    Weight as of this encounter: 54.1 kg (119 lb 4.3 oz).     LDA:            Assessment:   ASA SCORE: 3    NPO Status: > 2 hours since completed Clear Liquids; > 6 hours since completed Solid Foods      Proceeding: Proceed without further delay  Tobacco Use:  Active user of Tobacco       Last Use: Tobacco products AFTER midnight; Counseled about perioperative risks of tobacco use     Plan:   Anes. Type:  General   Pre-Induction: Midazolam IV; Albuterol Neb   Induction:  IV (Standard)   Airway: Oral ETT   Access/Monitoring: PIV   Maintenance: Balanced   Emergence: Procedure Site   Logistics: Same Day Surgery     Postop Pain/Sedation Strategy:  Standard-Options: Opioids PRN     PONV Management:  Adult Risk Factors: Female, Postop Opioids  Prevention: Ondansetron; Dexamethasone     CONSENT: Direct " conversation   Plan and risks discussed with: Patient   Blood Products: Consent Deferred (Minimal Blood Loss)                         Duane F. Szczepanski, MD

## 2019-06-07 NOTE — OR NURSING
Dr. Lu reviewed repeat chest xray done in phase II. Cleared patient for discharge. Updated family and patient. Discharge instructions to pt and responsible adult.  All questions regarding discharge information answered.  Pt and responsible adult verbalized understanding of all discharge instruction information given.

## 2019-06-07 NOTE — PROCEDURES
INTERVENTIONAL PULMONOLOGY       Procedure(s):    A flexible bronchoscopy  Airway exam  EBUS-TBNA (1 sites)  Radial EBUS Navigation (1 sites)  Transbronchial forcep biopsies (1 sites)    Indication:  ART mass    Attending of Record:  Ajay Us MD    Interventional Pulmonary Fellow   Jose Luis Lu MD     Trainees Present:   Prudencio Rice MD    Medications:    General Anesthesia - See anesthesia flowsheet for details  5 mL epinepherine    Sedation Time:   Per Anesthesia Care Provider    Time Out:  Performed    The patient's medical record has been reviewed.  The indication for the procedure was reviewed.  The necessary history and physical examination was performed and reviewed.  The risks, benefits and alternatives of the procedure were discussed with the the patient in detail and she had the opportunity to ask questions.  I discussed in particular the potential complications including risks of minor or life-threatening bleeding and/or infection, respiratory failure, vocal cord trauma / paralysis, pneumothorax, and discomfort. Sedation risks were also discussed including abnormal heart rhythms, low blood pressure, and respiratory failure. All questions were answered to the best of my ability.  Verbal and written informed consent was obtained.  The proposed procedure and the patient's identification were verified prior to the procedure by the physician and the nurse, technician, resident physician (resident / fellow), surgical team.    The patient was assessed for the adequacy for the procedure and to receive medications.   Mental Status:  Alert and oriented x 3  Airway examination:  Class I (Complete visualization of soft palate)  Pulmonary:  Clear to ausculation bilaterally  CV:  RRR, no murmurs or gallops  ASA Grade:  (III)  Severe systemic disease    After clinical evaluation and reviewing the indication, risks, alternatives and benefits of the procedure the patient was deemed to be in satisfactory  condition to undergo the procedure.      Immediately before administration of medications the patient was re-assessed for adequacy to receive sedatives including the heart rate, respiratory rate, mental status, oxygen saturation, blood pressure and adequacy of pulmonary ventilation. These same parameters were continuously monitored throughout the procedure.    A Tuberculosis risk assessment was performed:  The patient has no known RISK of Tuberculosis    The procedure was performed in a negative airflow room: The patient could not be moved to a negative airflow room because of needed OR for the procedure    Maneuvers / Procedure:      A Flexible  bronchoscope was used for the procedure. The patient was orally intubated with a # 8.5 ETT and the flexible scope was passed through.    Airway Examination: A complete airway examination was performed from the distal trachea to the subsegmental level in each lobe of both lungs.  Pertinent findings include normal anatomy, endobronchial lesion noted in the ART apical posterior segment.       EBUS-TBNA: The EBUS scope was inserted and biopsies were obtained from Station 11L with 7 passes, 7 samples obtained with CLAUDE present, a combination of suction and no suction was used to obtain the samples. EBUS samples were sent for cytology and flow cytometry.  Radial EBUS navigation: The 1.8mm 20MHz radial probe was inserted into the bronchoscope and used to navigate to the lesion. The lesion was eccentric  by ultrasound and fluoroscopy was not used to confirm placement of the probe. Epinephrine was administered and tissue sampling was obtained using biopsy forceps. CLAUDE was present  Transbronchial biopsies: One Site - The bronchoscope was inserted into the ART Apical-Posterior.  Epinephrine was administered.  The biopsy forceps were then advanced with out fluoroscopic guidance.  A total of 6 biopsies were obtained.     Any disposable equipment was visually inspected and deemed to be  intact immediately post procedure.      Recommendations:      -->  Follow up pathology results  -->  CXR post procedure    Dr. Us was present for the entire procedure.     Prudencio Rice MD  Pulmonary and Critical Care Fellow  Pager: [phone]

## 2019-06-07 NOTE — BRIEF OP NOTE
[facility], [place]    Brief Operative Note    Pre-operative diagnosis: Lung Nodule  Post-operative diagnosis * No post-op diagnosis entered *  Procedure: Procedure(s):  Radial Endobronchial Ultrasound  Transbronchial Biopsies  Surgeon: Surgeon(s) and Role:     * Pedro Luis Us MD - Primary     * Jose Luis Lu MD - Assisting  Anesthesia: General   Estimated blood loss: Minimal  Drains: None  Specimens:   ID Type Source Tests Collected by Time Destination   A : Station 11 L Tissue Lymph Node FINE NEEDLE ASPIRATION Pedro Luis Us MD 6/7/2019  1:55 PM    B : Left upper lobe nodule Tissue Lymph Node FINE NEEDLE ASPIRATION, LEUKEMIA LYMPHOMA EVALUATION (FLOW CYTOMETRY) Pedro Luis Us MD 6/7/2019  2:06 PM      Findings:   None.  Complications: None.  Implants:  * No implants in log *

## 2019-06-07 NOTE — OR NURSING
Received call from radiologist. Chest xray showed tiny left apical pneumothorax. This information was relayed to Dr. Lu. Order for repeat chest xray.

## 2019-06-07 NOTE — DISCHARGE INSTRUCTIONS
Post Bronchoscopy Patient Instructions:    June 7, 2019  Amberly Palafox    Your procedure was completed (bronchoscopy with biopsies) without any immediate complications.    You may cough up scant amount of blood for the next 12 hours. If you have excessive cough with blood, chest pain, shortness of breath, please report to the closest emergency room.    You may experience low grade (less than 100.5 F) fever next 24 hours, if so you can take tylenol. If the fever persists more than 24 hours contact to our office or your primary care provider.    Our office (Thoracic/Pulmonary--[phone]) will call you as soon as the results of biopsies are ready.    May resume your regular diet as it was prior to procedure.    Should you have any question, please do not hesitate to call our office.    MYLES Us MD      [facility] [place]  Same-Day Surgery   Adult Discharge Orders & Instructions     For 24 hours after surgery    1. Get plenty of rest.  A responsible adult must stay with you for at least 24 hours after you leave the hospital.   2. Do not drive or use heavy equipment.  If you have weakness or tingling, don't drive or use heavy equipment until this feeling goes away.  3. Do not drink alcohol.  4. Avoid strenuous or risky activities.  Ask for help when climbing stairs.   5. You may feel lightheaded.  IF so, sit for a few minutes before standing.  Have someone help you get up.   6. If you have nausea (feel sick to your stomach): Drink only clear liquids such as apple juice, ginger ale, broth or 7-Up.  Rest may also help.  Be sure to drink enough fluids.  Move to a regular diet as you feel able.  7. You may have a slight fever. Call the doctor if your fever is over 100 F (37.7 C) (taken under the tongue) or lasts longer than 24 hours.  8. You may have a dry mouth, a sore throat, muscle aches or trouble sleeping.  These should go away after 24 hours.  9. Do not make important or  legal decisions.   Call your doctor for any of the followin.  Signs of infection (fever, growing tenderness at the surgery site, a large amount of drainage or bleeding, severe pain, foul-smelling drainage, redness, swelling).    2. It has been over 8 to 10 hours since surgery and you are still not able to urinate (pass water).    3.  Headache for over 24 hours.    To contact a doctor, call Dr. Us's office at [phone] or:      [phone] and ask for the resident on call for Pulmonology (answered 24 hours a day)      Emergency Department:  [facility]: [phone]       (TTY for hearing impaired: [phone])

## 2019-06-07 NOTE — ANESTHESIA POSTPROCEDURE EVALUATION
Anesthesia POST Procedure Evaluation    Patient: Amberly Palafox   MRN:     [id number] Gender:   female   Age:    74 year old :      1945        Preoperative Diagnosis: Lung Nodule   Procedure(s):  Radial Endobronchial Ultrasound  Transbronchial Biopsies   Postop Comments: No value filed.       Anesthesia Type:  General  No value filed.    Reportable Event: NO     PAIN: Uncomplicated   Sign Out status: Comfortable, Well controlled pain     PONV: No PONV   Sign Out status:  No Nausea or Vomiting     Neuro/Psych: Uneventful perioperative course   Sign Out Status: Preoperative baseline; Age appropriate mentation     Airway/Resp.: Uneventful perioperative course   Sign Out Status: Non labored breathing, age appropriate RR; Resp. Status within EXPECTED Parameters     CV: Uneventful perioperative course   Sign Out status: Appropriate BP and perfusion indices; Appropriate HR/Rhythm     Disposition:   Sign Out in:  PACU  Disposition:  Phase II; Home  Recovery Course: Uneventful  Follow-Up: Not required           Last Anesthesia Record Vitals:  CRNA VITALS  2019 1407 - 2019 1452      2019             EKG:  Sinus rhythm          Last PACU Vitals:  Vitals Value Taken Time   /63 2019  2:50 PM   Temp 37.1  C (98.7  F) 2019  2:40 PM   Pulse 83 2019  2:50 PM   Resp 14 2019  2:45 PM   SpO2 98 % 2019  2:51 PM   Temp src     NIBP     Pulse     SpO2     Resp     Temp     Ht Rate     Temp 2     Vitals shown include unvalidated device data.      Electronically Signed By: Duane F. Szczepanski, MD, 2019, 2:52 PM

## 2019-06-07 NOTE — ANESTHESIA CARE TRANSFER NOTE
Patient: Amberly Palafox    Procedure(s):  Radial Endobronchial Ultrasound  Transbronchial Biopsies    Diagnosis: Lung Nodule  Diagnosis Additional Information: No value filed.    Anesthesia Type:   No value filed.     Note:  Airway :Nasal Cannula  Patient transferred to:PACU  Handoff Report: Identifed the Patient, Identified the Reponsible Provider, Reviewed the pertinent medical history, Discussed the surgical course, Reviewed Intra-OP anesthesia mangement and issues during anesthesia, Set expectations for post-procedure period and Allowed opportunity for questions and acknowledgement of understanding      Vitals: (Last set prior to Anesthesia Care Transfer)    CRNA VITALS  6/7/2019 1407 - 6/7/2019 1444      6/7/2019             EKG:  Sinus rhythm                Electronically Signed By: CARLY Dejesus CRNA  June 7, 2019  2:44 PM

## 2019-06-07 NOTE — OR NURSING
Dr. Us viewed xray and verbalized it looks good, no concerns, pt may discharge home when she is ready.

## 2019-06-14 NOTE — TELEPHONE ENCOUNTER
"Daughter calling triage wanting to \"get her mom scheduled\" for testing and other appointments based on her last conversation with Dr. Us.    Roxana is already scheduled to have her PET on Monday and is scheduled to see Dr. Stubbs on Wednesday. Her brain MRI has already been completed. There is nothing further to schedule at this time.    Call returned to daughterAdriana however, there was no answer. Message left.  "

## 2019-06-14 NOTE — TELEPHONE ENCOUNTER
Person calling: Pt's daughter Adriana     Reason for call: test results and scheduling questions. Pt was advised by Dr. Us to have imaging. She has completed MRI and is scheduled Monday for the PET scan. Adriana would like to set up follow-up visit with Dr. Us next week if possible but she is unsure of plan. Requesting call from Care Coordinator to discuss plan and get scheduled.    Action taken: routed this encounter to the following provider(s):  Thoracic Care Coordination Pool    Yancy Mercedes RN   [facility]

## 2019-06-19 PROBLEM — D70.1 CHEMOTHERAPY-INDUCED NEUTROPENIA (H): Status: ACTIVE | Noted: 2019-01-01

## 2019-06-19 PROBLEM — T45.1X5A CHEMOTHERAPY-INDUCED NEUTROPENIA (H): Status: ACTIVE | Noted: 2019-01-01

## 2019-06-19 PROBLEM — C34.90 SMALL CELL LUNG CANCER (H): Status: ACTIVE | Noted: 2019-01-01

## 2019-06-19 PROBLEM — T45.1X5A CHEMOTHERAPY INDUCED NAUSEA AND VOMITING: Status: ACTIVE | Noted: 2019-01-01

## 2019-06-19 PROBLEM — R11.2 CHEMOTHERAPY INDUCED NAUSEA AND VOMITING: Status: ACTIVE | Noted: 2019-01-01

## 2019-06-19 NOTE — NURSING NOTE
"Oncology Rooming Note    June 19, 2019 12:04 PM   Amberly Palafox is a 74 year old female who presents for:    Chief Complaint   Patient presents with     Blood Draw     No lab orders.  VS taken     Oncology Clinic Visit     New, Small Cell Ca of Lung     Initial Vitals: /66 (BP Location: Right arm, Patient Position: Sitting, Cuff Size: Adult Regular)   Pulse 92   Temp 99.4  F (37.4  C) (Oral)   Resp 16   Ht 1.575 m (5' 2.01\")   Wt 52.6 kg (115 lb 14.4 oz)   SpO2 98%   BMI 21.19 kg/m   Estimated body mass index is 21.19 kg/m  as calculated from the following:    Height as of this encounter: 1.575 m (5' 2.01\").    Weight as of this encounter: 52.6 kg (115 lb 14.4 oz). Body surface area is 1.52 meters squared.  No Pain (0) Comment: Data Unavailable   No LMP recorded. Patient is postmenopausal.  Allergies reviewed: Yes  Medications reviewed: Yes    Medications: Medication refills not needed today.  Pharmacy name entered into [employer]:    [facility] [address] [facility] [address] [facility]  [facility]  FOR [facility], [place] - [address] [place] [address]    Clinical concerns: None, Dr Stubbs was NOT notified.      AI SAVAGE LPN            "

## 2019-06-19 NOTE — LETTER
6/19/2019       RE: Amberly Palafox  [address] [place] [address]     Dear Colleague,    Thank you for referring your patient, Amberly Palafox, to the [facility]. Please see a copy of my visit note below.    EALTH  [facility]    NEW PATIENT VISIT NOTE    PATIENT NAME: Amberly Palafox MRN # [id number]  DATE OF VISIT: June 19, 2019 YOB: 1945    Referring Provider: Dr. Us    CANCER TYPE: High grade neuroendocrine carcinoma  STAGE: Extensive  ECOG PS: 0    Cancer Staging  Small cell lung cancer (H)  Staging form: Lung, AJCC 8th Edition  - Clinical stage from 6/19/2019: Stage IV (cT2a, cN3, pM1c) - Signed by Candy Stubbs MD on 6/19/2019    PD-L1:  NGS:    SUMMARY  9/25/18 LDCT for screening through PCP. 3.5 x 2.9 cm ART mass  12/11/18 PET/CT. 3.3 x 3.3 cm ART mass (SUV 15.4), lobular component extending anteriorly and medially, L hilar HEATHER, bilateral axillary HEATHER; 1.8 x 1.1 cm R axillary node (SUV 2.1), 1.9 x 0.9 cm potential LN vs soft tissue nodule immediately adjacent to and anterior to R first rib and clavicle (SUV 3.1). 1 cm L periarotic and a few other small rounded retroperitoneal LN  5/29/19 CT chest w/o contrast. Mucoid impactions vs endobronchial tumor leading to ART mass, 4.4 x 3.8 cm ART mass, 0.3 cm ART nodule, 0.4, 0.3, 0.4 cm ART nodules, new since previous, 1.7 x 1.3 cm R axillary LN, 1.9 x 1.4 cm L axillary LN, additional mildly prominent bilateral axillary, subpectoral and supraclav LN, mediastinal adenopathy.   6/7/19 EBUS/bronch (Dr. Us). No endobronchial lesions. Radial EBUS transbronchial bx of ART mass  6/14/19 Brain MRI negative for mets  6/17/19 PET/CT. 5.1 x 4.4 cm ART mass (SUV 20.3), few small satellite nodules new since Dec, stable since 5/29. New LLL subpleural nodule, RLL nodule, bilateral axillary HEATHER; 2.2 x 1.3 cm L axillary node (SUV 3.7), 2.0 x 1.3 cm R axillary LN (SUV 4.1),  additional hypermetabolic axillary nodes, 1.3 x 0.8 cm L peribronchial LN (SUV 4.5), L hilar adenopathy, new 0.4 cm hypodensity R liver, stable L periaortic LN.    DENZEL Schmidt presents today to establish care with me for newly diagnosed SCLC. She is accompanied by her daughter. She initially underwent a LDCT in September that showed a ART mass. Had a PET/CT (under solitary pulm nodule) and a bx was recommended but she didn't want to pursue that - was in denial that anything was there and was scared. Saw her PCP again in May and at that time was ready to move forward with finding out what it was. Had CT and underwent bronch/EBUS as above. Path showed a small cell carcinoma, but not classical in appearance; synaptophysin +crystal, chromogranin -crystal, Ki67 >95% in viable tumor cells, CD56 +crystal, TTF-1 -crystal, p40 -negative, CD45 negative. Morphology favored poorly differentiated malignancy, with immunostaining pattern favoring high grade neuroendocrine (small cell) carcinoma.    She feels ok physically. Has been more tired in the last month. Appetite good. No weight loss. Denies HA, fevers, chills, N/V. Has chronic cough. No shortness of breath, chest pain/pressure, pain, constipation, diarrhea, numbness/tingling, leg swelling     PAST MEDICAL HISTORY  SCLC as above  COPD. PFT 2013 FEV1 1.08 (48%), FVC 2.02 (69%), DLCOcor 12.9 (63%)  H/o Hashimoto's thyroiditis, now hypothyroidism  Osteopenia  Dyslipidemia  Vitamin D deficiency    CURRENT OUTPATIENT MEDICATIONS  Current Outpatient Medications   Medication Sig Dispense Refill     albuterol (PROAIR HFA/PROVENTIL HFA/VENTOLIN HFA) 108 (90 Base) MCG/ACT inhaler Inhale 2 puffs into the lungs every 4 hours as needed for shortness of breath / dyspnea 18 g 3     B Complex-C (SUPER B COMPLEX PO) Take 1 tablet by mouth daily       cholecalciferol (VITAMIN D3) 5000 units CAPS capsule Take by mouth daily       FISH OIL        levothyroxine (SYNTHROID/LEVOTHROID) 175 MCG tablet Take  "1 tablet (175 mcg) by mouth daily 90 tablet 3     Multiple Vitamins-Minerals (MULTIVITAMIN OR) Take  by mouth.       tiotropium (SPIRIVA RESPIMAT) 2.5 MCG/ACT inhaler Inhale 2 puffs into the lungs daily Please dispense 3 months supply 12 g 3     atorvastatin (LIPITOR) 20 MG tablet   3     varenicline (CHANTIX CONTINUING MONTH RANDAL) 1 MG tablet Take 1 tablet (1 mg) by mouth 2 times daily (Patient not taking: Reported on 6/19/2019) 60 tablet 1     varenicline (CHANTIX RANDAL) 0.5 MG X 11 & 1 MG X 42 tablet Take 0.5 mg tab daily for 3 days, THEN 0.5 mg tab twice daily for 4 days, THEN 1 mg twice daily. (Patient not taking: Reported on 6/19/2019) 53 tablet 0     ALLERGIES  No Known Allergies    SOCIAL HISTORY: Not . Has two daughters - one here and one in [facility]. Not working currently. No current ETOH. Current smoker, about 0.5 PPD, about 50 years. Has cut down quite a bit lately.     FAMILY HISTORY:   Family History   Problem Relation Age of Onset     Thyroid Disease Mother         hypothyroid     Heart Disease Mother         s/p stents     Hypertension Mother      Thyroid Disease Daughter         hypothyroid     Heart Disease Maternal Grandmother 94     Lupus Sister      REVIEW OF SYSTEMS  As above in the HPI, o/w complete 12-point ROS was negative.    PHYSICAL EXAM  /66 (BP Location: Right arm, Patient Position: Sitting, Cuff Size: Adult Regular)   Pulse 92   Temp 99.4  F (37.4  C) (Oral)   Resp 16   Ht 1.575 m (5' 2.01\")   Wt 52.6 kg (115 lb 14.4 oz)   SpO2 98%   BMI 21.19 kg/m     Wt Readings from Last 3 Encounters:   06/07/19 54.1 kg (119 lb 4.3 oz)   06/05/19 53.7 kg (118 lb 4.8 oz)   05/17/19 54.9 kg (121 lb)     GEN: NAD, anxious  LUNGS: clear bilaterally  CV: regular, no murmurs, rubs, or gallops  EXT: warm, well perfused, no edema  NEURO: alert    LABORATORY AND IMAGING STUDIES  Results for BRENT ALEJANDRE (MRN [id number]) as of 6/19/2019 16:48   6/19/2019 13:50   Sodium 135   Potassium " 4.0   Chloride 102   Carbon Dioxide 26   Urea Nitrogen 12   Creatinine 0.54   GFR Estimate >90   GFR Estimate If Black >90   Calcium 8.8   Anion Gap 7   Albumin 3.7   Protein Total 8.3   Bilirubin Total 0.5   Alkaline Phosphatase 75   ALT 15   AST 17   T4 Free 1.66 (H)   TSH 1.36   Glucose 85   WBC 5.7   Hemoglobin 11.7   Hematocrit 37.1   Platelet Count 307   RBC Count 3.94   MCV 94   MCH 29.7   MCHC 31.5   RDW 12.8   Diff Method Automated Method   % Neutrophils 77.3   % Lymphocytes 15.4   % Monocytes 5.8   % Eosinophils 0.7   % Basophils 0.4   % Immature Granulocytes 0.4   Nucleated RBCs 0   Absolute Neutrophil 4.4   Absolute Lymphocytes 0.9   Absolute Monocytes 0.3   Absolute Eosinophils 0.0   Absolute Basophils 0.0   Abs Immature Granulocytes 0.0   Absolute Nucleated RBC 0.0     Copath Report 06/07/2019  2:06 PM 88   Patient Name: OLE ALEJANDRE   MR#: [id number]   Specimen #: [id number]   Collected: 6/7/2019   Received: 6/7/2019   Reported: 6/12/2019 11:08   Ordering Phy(s): SONNY CHRIS     For improved result formatting, select 'View Enhanced Report Format' under    Linked Documents section.     SPECIMEN/STAIN PROCESS:   A: Lymph node, 11L ,endobronchial ultrasound guided fine needle aspiration        Diff Quick Stain-cyto x 3, Cell Block w/ H&E-Cyto x 1, Save Ribbon, 1    x 1, Cell Block, Level 2 x 1,   Save Ribbon, 2 x 1, Cell Block, Level 3 x 1   B: Lung, left upper lobe , endobronchial ultrasound guided fine needle   aspiration        Diff Quick Stain-cyto x 2, Cell Block w/ H&E-Cyto x 1, Save Ribbon, 1    x 1, Cell Block, Level 2 x 1,   Save Ribbon, 2 x 1, Cell Block, Level 3 x 1, TTF-1 x 1, p40 x 1, CD45 LCA   x 1, Chromogranin x 1, Synaptophysin   x 1, Ki-67 x 1, CD56 x 1     ----------------------------------------------------------------     CYTOLOGIC INTERPRETATION:     A.  Lymph node, 11L ,endobronchial ultrasound guided fine needle   aspiration:   Negative for malignancy Polymorphous  population of lymphocytes   Specimen Adequacy: Satisfactory for evaluation.     B.  Lung, left upper lobe , endobronchial ultrasound guided fine needle   aspiration:   Poorly differentiated   high grade malignancy, favor small cell carcinoma (see comment) .   Specimen Adequacy: Satisfactory for evaluation.     COMMENT:   B:  The cytologic preparations show a proliferation of cells with   irregular nuclei and  high N:C ratios. Granular nuclei can be seen in some cell clusters, and mitotic figures are  present.  Necrosis an degeneration of tumor is noted.  Due to the necrosis and degeneration, these features while concerning for a malignancy, do not display a definitive morphology for classification.  The following immunohistochemical stains are performed to better characterize the cells, with appropriate controls:     TTF-1: negative   P40: negative   CD45: negative   Chromogranin: negative   Synaptophysin: positive   Ki67: >95% positive in viable tumor   CD56: diffusely positive     Based on the morphology, the findings are consistent with a poorly   differentiated malignancy.  The   immunostaining pattern favors a high grade neuroendocrine (small cell)   carcinoma.   Dr. CARIDAD Engle has seen specimen B and concurs with the diagnosis and above    comment.     I have personally reviewed all specimens and/or slides, including the   listed special stains, and used them   with my medical judgement to determine or confirm the final diagnosis.     Electronically signed out by:   Pascual Jalloh M.D., [facility]     CLINICAL HISTORY:   74 year old female with COPD and a left upper lobe lesion concerning for   malignancy.  Found via CT in 9/2018.   Treatment was delayed at the time.     ,     GROSS:   A. Lymph node, 11L ,endobronchial ultrasound guided fine needle   aspiration:  Received are 3 air dried slides,   processed for Diff Quik stain, and material in formalin, processed for one    hematoxylin stained cell block.      B. Lung, left upper lobe , endobronchial ultrasound guided fine needle   aspiration:  Received are 2 air dried   slides, processed for Diff Quik stain, and material in formalin, processed    for one hematoxylin stained cell   block. Material in RPMI submitted for Flow Cytometry.     INTRAOPERATIVE CONSULTATION:   FNA Performance: Fine needle aspiration was not performed by [facility],    Pathology staff.   Immediate Adequacy: On site specimen adequacy evaluation was performed by   Dr. BENITO Brooks MD via telepathology.     Onsite adequacy/interpretation:   Pass A1-A2 put directly into formalin, Pass A3 inadequate, Pass A4-A5 put   directly into formalin, Pass A6   inadequate, Pass A7 put directly into formalin, Pass A8 adequate   Pass B1 inadequate, Pass B2 adequate, Pass B3-B5 put directly into   formalin.     MICROSCOPIC:   Microscopic examination was performed.   Mahsa Clemente M.D, PGY-2 Resident   Pascual Jalloh MD, Attending     CPT Codes:   A: 88029-QHVF-ZSL, 66489-NHIQ-FSC, 05330-JJHI, 24166-LOJ, HCB   B: 60812-REIS-FXF, 72620-RYAR-HMZ, 37440-ROTD, 80626-QLF, HCB, 48662-DWP,   99085-LYK, 31047-WIJ, 42121-GIY,   10416-XPJ, 79002-DGV, 92672-OXB     COLLECTION SITE:   Client:  General acute hospital   Location:  [facility] ()     The technical component of this testing was completed at the [facility], with the professional component performed    at the [facility], [address] [place] [id number] ([phone])     Resident   [id number]      Results for orders placed or performed during the hospital encounter of 06/17/19   PET Oncology Whole Body    Narrative    PET/CT SKULL BASE TO MID THIGH DIAGNOSTIC WITH IV CONTRAST  6/17/2019  4:46 PM     HISTORY: Small cell lung cancer, left upper lobe (H).    COMPARISON EXAMS:  [facility] None.  [facility]  PET/CT 12/11/2018.  OTHER: None.    TECHNIQUE:  11.1 mCi of F-18 FDG were administered  intravenously.   Following the uneventful administration of 100 mL Isovue 370  intravenous contrast and oral contrast, a PET/CT scan was performed  from the skull base through the mid thigh.   A diagnostic CT scan was  performed of the chest, abdomen and pelvis. Coronal, sagittal and  axial images were created and fused with the contrast enhanced CT  examination.  Blood glucose: 93 mg/dL.  The CT, PET and fusion images  are then evaluated on a [employer] workstation.  Radiation dose for  this scan was reduced using automated exposure control, adjustment of  the mA and/or kV according to patient size, or iterative  reconstruction technique.    FINDINGS: Normal physiologic uptake is identified within the salivary  glands, myocardium, kidneys, ureters and bladder.  Scattered areas of  physiologic bowel uptake are also present.    NECK:  Lymph nodes: No pathologic activity. No enlarged cervical lymph nodes.    Additional findings: None.    CHEST:  Lungs: Left upper lobe lung mass measures 5.1 x 4.4 cm with an SUV max  20.3, previously measuring 4.4 x 3.8 cm on the more recent chest CT.  PET/CT exam demonstrated the left upper lobe lung mass to measure 3.3  x 3.3 cm with an SUV max 15.4. Findings would indicate interval  progression of patient's left upper lobe lung cancer. A few small  satellite nodules are noted in the left upper lobe, minimally changed  since recent chest CT but new since prior PET/CT exam. A new  subpleural left lower lobe lung nodule is noted on image 124 of series  8, not evident on prior exams. Metastatic lesion is likely. Similar  tiny lesion is noted in the anterior right lower lobe near the major  fissure on image 94. Probable right major fissure lymph node is noted  on image 89 and is stable. Mild interlobular septal thickening is  noted in the upper lobes, new since prior exam. Centrilobular  emphysema is  again noted.    Lymph nodes: Bilateral hypermetabolic axillary lymph nodes are  present. Right axillary lymph node on series 5, image 97 measures 2.0  x 1.3 cm with an SUV max 4.1, and the left axilla demonstrates a lymph  node on image 95 measuring 2.2 x 1.3 cm with an SUV max 3.7 consistent  with metastatic axillary adenopathy. Additional hypermetabolic nodes  are noted in the right and left axilla. Previously noted activity near  the right clavicular head has resolved. Hypermetabolic left  peribronchial lymph node on image 117 measures 1.3 x 0.8 cm with an  SUV max 4.5, previously 1.0 x 0.9 cm with an SUV max 3.3. Left hilar  lymph node on image 117 measures 1.1 x 1.1 cm with an SUV max 10.7,  previously 0.8 x 0.8 cm with an SUV max 3.4. No other hypermetabolic  intrathoracic lymph nodes are appreciated.    Additional findings: No pleural or pericardial fluid. Heart is normal  in size. The esophagus is unremarkable. Calcified plaque is noted in  the aorta and coronary arteries.    ABDOMEN/PELVIS:  Hepatobiliary: No pathologic activity. There is a new tiny hypodensity  posterior right hepatic dome not appreciated on prior exam measuring  0.4 cm but is too small to further characterize by CT or PET imaging.  No other liver lesions are appreciated. No calcified gallstones.    Spleen: No pathologic activity. No enlargement or mass.    Pancreas: No pathologic activity. No evidence of a mass or surrounding  inflammation.    Kidneys: No pathologic activity. Probable tiny cyst upper pole right  kidney is unchanged. No hydronephrosis. Bladder is decompressed but  otherwise unremarkable.    Adrenals: No pathologic activity. No adrenal mass.    Reproductive: No pathologic activity. Uterus is unremarkable. No  adnexal mass.    Gastrointestinal: No pathologic activity. No bowel obstruction.    Lymph nodes: No pathologic activity. Previously noted left periaortic  lymph node now measures 0.7 cm on series 5, image 204,  previously 1.0  cm. No associated hypermetabolism. No enlarged abdominal or pelvic  lymph nodes.    Additional findings: Extensive calcified plaque is noted in the aorta  without aneurysm or dissection. Lower extremities are unremarkable.    SKELETON:   No pathologic activity.      Impression    IMPRESSION:  1. Interval progression of patient's left upper lobe lung cancer with  worsening hypermetabolic adenopathy in the right and left axilla, left  hilum and left peribronchial region of the mediastinum.   2. No evidence of metastatic disease in the neck, abdomen or pelvis.  Small retroperitoneal nodes are stable if not smaller in size.  3. Several tiny new bilateral lung nodules, possibly metastatic  disease but too small to characterize by PET imaging. Follow-up CT  imaging as needed to assess for any interval change.     ARNOLD LAWSON MD     MRI OF THE BRAIN WITHOUT AND WITH CONTRAST 6/14/2019 7:05 AM      COMPARISON: None.     HISTORY:  Small cell lung cancer, left (H).      TECHNIQUE: Axial diffusion-weighted with ADC map, axial T2-weighted  with fat saturation, axial T1-weighted, axial turboFLAIR and coronal  T1-weighted images of the brain were acquired without intravenous  contrast.  Following intravenous administration of gadolinium (5 mL  Gadavist), axial T1-weighted images of the brain were acquired.      FINDINGS: There is mild diffuse cerebral volume loss. There are  numerous tiny scattered focal areas of abnormal T2 signal  hyperintensity in the cerebral white matter bilaterally that are  consistent with sequela of chronic small vessel ischemic disease.     The ventricles and basal cisterns are within normal limits in  configuration given the degree of cerebral volume loss.  There is no  midline shift.  There are no extra-axial fluid collections.  There is  no evidence for stroke or acute intracranial hemorrhage.      There is vascular enhancement in the deep white matter at the  posterior aspect of the  right frontal lobe best seen on the coronal  postcontrast images that likely represents a small venous angioma.  There is no abnormal contrast enhancement in the brain or its  coverings.     There is no sinusitis or mastoiditis.                                                                      IMPRESSION:  Diffuse cerebral volume loss and cerebral white matter  changes consistent with chronic small vessel ischemic disease. No  evidence for acute intracranial pathology. No evidence for  intracranial metastases.     IMMANUEL BAIRES MD     Imaging was personally reviewed and I showed Roxana and her daughter the images too.     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 Has bilateral hypermetabolic nodules, making this extensive stage. Discussed some the nuances of the biopsy results. Explained that the goals of care are to control the cancer for as long as possible while maintaining good QOL, but that treatment is not curative. Recommended carboplatin AUC 5 (D1), etoposide 100 mg/m2 (D1-3), atezolizumab (D1). Reviewed potential drug specific and general side effects. Would do 2 cycles and restage, completing up to 4 cycles in the absence of significant toxicity and progression, followed by maintenance atezolizumab. Discussed typically high response rate but that it might be less given the non-classic morphology. Reviewed the other option of choosing not to treat the cancer. Emphasized that we would continue to follow and care for her if she chose that. Also discussed that if, at any point, short of sudden/severe issues, if she wanted to stop treatment at any time for any reason, we would support her in that. At the end of our 45 minute discussion, she will think about it a bit and get back to us with a decision. I offered a second opinion but she felt she had enough information to  make a decision. We scheduled infusion room appts for her before today's appointment, and can cancel if not needed.     ADDENDUM: Called back about an hour after our appointment and has chosen to move forward. Will proceed with treatment tomorrow, finishing Monday. Will need neulasta on pro. I'll ask her to return for a toxicity check either at the end of next week or early the following week. I'll see her prior to C3 with restaging CT CAP.    Coping: Obviously and understandably having a very hard time. Her hope is that this cancer just goes away and she won't have to worry about it anymore. She finds it unbelievable that it's there. I encouraged her to establish care with Palliative Care, to add another layer of support through this. She's agreeable.     Tobacco dependence: She's cut down quite a bit. I think she's ready to quit. Discussed the tobacco cessation program we have here and its benefits. She has a prescription for varinicline through her PCP. We'll follow up on this at the next visit, but encouraged her to quit as we have data showing that people who are unable to quit tend to have poorer outcomes.    Mild hyperthyroidism: Has H/o Hashimoto's and is on levothyroxine. Will recheck over the next several weeks and adjust levothyroxine dose if remains elevated.     COPD: Breathing ok.     Social: She really hopes to spend hernández and maybe more in [facility], where her other daughter lives. We'll find a provider there. [place] is about 4 hours away, so [place] would be another option.     A total of 65 minutes was spent with the patient, >50% of which was spent in counseling and coordination of care.    Candy Stubbs MD    Hematology, Oncology and Transplantation

## 2019-06-19 NOTE — NURSING NOTE
Chief Complaint   Patient presents with     Blood Draw     No lab orders.  VS taken     As above.  VS taken and pt checked in for MD appt.     Brie Cody RN

## 2019-06-19 NOTE — PROGRESS NOTES
[facility]  [facility]CER CLINIC    NEW PATIENT VISIT NOTE    PATIENT NAME: Amberly Palafox MRN # [id number]  DATE OF VISIT: June 19, 2019 YOB: 1945    Referring Provider: Dr. Us    CANCER TYPE: High grade neuroendocrine carcinoma  STAGE: Extensive  ECOG PS: 0    Cancer Staging  Small cell lung cancer (H)  Staging form: Lung, AJCC 8th Edition  - Clinical stage from 6/19/2019: Stage IV (cT2a, cN3, pM1c) - Signed by Candy Stubbs MD on 6/19/2019    PD-L1:  NGS:    SUMMARY  9/25/18 LDCT for screening through PCP. 3.5 x 2.9 cm ART mass  12/11/18 PET/CT. 3.3 x 3.3 cm ART mass (SUV 15.4), lobular component extending anteriorly and medially, L hilar HEATHER, bilateral axillary HEATHER; 1.8 x 1.1 cm R axillary node (SUV 2.1), 1.9 x 0.9 cm potential LN vs soft tissue nodule immediately adjacent to and anterior to R first rib and clavicle (SUV 3.1). 1 cm L periarotic and a few other small rounded retroperitoneal LN  5/29/19 CT chest w/o contrast. Mucoid impactions vs endobronchial tumor leading to ART mass, 4.4 x 3.8 cm ART mass, 0.3 cm ART nodule, 0.4, 0.3, 0.4 cm ART nodules, new since previous, 1.7 x 1.3 cm R axillary LN, 1.9 x 1.4 cm L axillary LN, additional mildly prominent bilateral axillary, subpectoral and supraclav LN, mediastinal adenopathy.   6/7/19 EBUS/bronch (Dr. Us). No endobronchial lesions. Radial EBUS transbronchial bx of ART mass  6/14/19 Brain MRI negative for mets  6/17/19 PET/CT. 5.1 x 4.4 cm ART mass (SUV 20.3), few small satellite nodules new since Dec, stable since 5/29. New LLL subpleural nodule, RLL nodule, bilateral axillary HEATHER; 2.2 x 1.3 cm L axillary node (SUV 3.7), 2.0 x 1.3 cm R axillary LN (SUV 4.1), additional hypermetabolic axillary nodes, 1.3 x 0.8 cm L peribronchial LN (SUV 4.5), L hilar adenopathy, new 0.4 cm hypodensity R liver, stable L periaortic LN.    DENZEL  Roxana presents today to establish care with me for newly diagnosed SCLC.  She is accompanied by her daughter. She initially underwent a LDCT in September that showed a ART mass. Had a PET/CT (under solitary pulm nodule) and a bx was recommended but she didn't want to pursue that - was in denial that anything was there and was scared. Saw her PCP again in May and at that time was ready to move forward with finding out what it was. Had CT and underwent bronch/EBUS as above. Path showed a small cell carcinoma, but not classical in appearance; synaptophysin +crystal, chromogranin -crystal, Ki67 >95% in viable tumor cells, CD56 +crystal, TTF-1 -crystal, p40 -negative, CD45 negative. Morphology favored poorly differentiated malignancy, with immunostaining pattern favoring high grade neuroendocrine (small cell) carcinoma.    She feels ok physically. Has been more tired in the last month. Appetite good. No weight loss. Denies HA, fevers, chills, N/V. Has chronic cough. No shortness of breath, chest pain/pressure, pain, constipation, diarrhea, numbness/tingling, leg swelling     PAST MEDICAL HISTORY  SCLC as above  COPD. PFT 2013 FEV1 1.08 (48%), FVC 2.02 (69%), DLCOcor 12.9 (63%)  H/o Hashimoto's thyroiditis, now hypothyroidism  Osteopenia  Dyslipidemia  Vitamin D deficiency    CURRENT OUTPATIENT MEDICATIONS  Current Outpatient Medications   Medication Sig Dispense Refill     albuterol (PROAIR HFA/PROVENTIL HFA/VENTOLIN HFA) 108 (90 Base) MCG/ACT inhaler Inhale 2 puffs into the lungs every 4 hours as needed for shortness of breath / dyspnea 18 g 3     B Complex-C (SUPER B COMPLEX PO) Take 1 tablet by mouth daily       cholecalciferol (VITAMIN D3) 5000 units CAPS capsule Take by mouth daily       FISH OIL        levothyroxine (SYNTHROID/LEVOTHROID) 175 MCG tablet Take 1 tablet (175 mcg) by mouth daily 90 tablet 3     Multiple Vitamins-Minerals (MULTIVITAMIN OR) Take  by mouth.       tiotropium (SPIRIVA RESPIMAT) 2.5 MCG/ACT inhaler Inhale 2 puffs into the lungs daily Please dispense 3 months supply 12 g 3      "atorvastatin (LIPITOR) 20 MG tablet   3     varenicline (CHANTIX CONTINUING MONTH RANDAL) 1 MG tablet Take 1 tablet (1 mg) by mouth 2 times daily (Patient not taking: Reported on 6/19/2019) 60 tablet 1     varenicline (CHANTIX RANDAL) 0.5 MG X 11 & 1 MG X 42 tablet Take 0.5 mg tab daily for 3 days, THEN 0.5 mg tab twice daily for 4 days, THEN 1 mg twice daily. (Patient not taking: Reported on 6/19/2019) 53 tablet 0     ALLERGIES  No Known Allergies    SOCIAL HISTORY: Not . Has two daughters - one here and one in [facility]. Not working currently. No current ETOH. Current smoker, about 0.5 PPD, about 50 years. Has cut down quite a bit lately.     FAMILY HISTORY:   Family History   Problem Relation Age of Onset     Thyroid Disease Mother         hypothyroid     Heart Disease Mother         s/p stents     Hypertension Mother      Thyroid Disease Daughter         hypothyroid     Heart Disease Maternal Grandmother 94     Lupus Sister      REVIEW OF SYSTEMS  As above in the HPI, o/w complete 12-point ROS was negative.    PHYSICAL EXAM  /66 (BP Location: Right arm, Patient Position: Sitting, Cuff Size: Adult Regular)   Pulse 92   Temp 99.4  F (37.4  C) (Oral)   Resp 16   Ht 1.575 m (5' 2.01\")   Wt 52.6 kg (115 lb 14.4 oz)   SpO2 98%   BMI 21.19 kg/m    Wt Readings from Last 3 Encounters:   06/07/19 54.1 kg (119 lb 4.3 oz)   06/05/19 53.7 kg (118 lb 4.8 oz)   05/17/19 54.9 kg (121 lb)     GEN: NAD, anxious  LUNGS: clear bilaterally  CV: regular, no murmurs, rubs, or gallops  EXT: warm, well perfused, no edema  NEURO: alert    LABORATORY AND IMAGING STUDIES  Results for BRENT ALEJANDRE JOSE ANGEL (MRN [id number]) as of 6/19/2019 16:48   6/19/2019 13:50   Sodium 135   Potassium 4.0   Chloride 102   Carbon Dioxide 26   Urea Nitrogen 12   Creatinine 0.54   GFR Estimate >90   GFR Estimate If Black >90   Calcium 8.8   Anion Gap 7   Albumin 3.7   Protein Total 8.3   Bilirubin Total 0.5   Alkaline Phosphatase 75   ALT 15   AST " 17   T4 Free 1.66 (H)   TSH 1.36   Glucose 85   WBC 5.7   Hemoglobin 11.7   Hematocrit 37.1   Platelet Count 307   RBC Count 3.94   MCV 94   MCH 29.7   MCHC 31.5   RDW 12.8   Diff Method Automated Method   % Neutrophils 77.3   % Lymphocytes 15.4   % Monocytes 5.8   % Eosinophils 0.7   % Basophils 0.4   % Immature Granulocytes 0.4   Nucleated RBCs 0   Absolute Neutrophil 4.4   Absolute Lymphocytes 0.9   Absolute Monocytes 0.3   Absolute Eosinophils 0.0   Absolute Basophils 0.0   Abs Immature Granulocytes 0.0   Absolute Nucleated RBC 0.0     Copath Report 06/07/2019  2:06 PM 88   Patient Name: OLE ALEJANDRE   MR#: [id number]   Specimen #: [id number]   Collected: 6/7/2019   Received: 6/7/2019   Reported: 6/12/2019 11:08   Ordering Phy(s): SONNY CHRIS     For improved result formatting, select 'View Enhanced Report Format' under    Linked Documents section.     SPECIMEN/STAIN PROCESS:   A: Lymph node, 11L ,endobronchial ultrasound guided fine needle aspiration        Diff Quick Stain-cyto x 3, Cell Block w/ H&E-Cyto x 1, Save Ribbon, 1    x 1, Cell Block, Level 2 x 1,   Save Ribbon, 2 x 1, Cell Block, Level 3 x 1   B: Lung, left upper lobe , endobronchial ultrasound guided fine needle   aspiration        Diff Quick Stain-cyto x 2, Cell Block w/ H&E-Cyto x 1, Save Ribbon, 1    x 1, Cell Block, Level 2 x 1,   Save Ribbon, 2 x 1, Cell Block, Level 3 x 1, TTF-1 x 1, p40 x 1, CD45 LCA   x 1, Chromogranin x 1, Synaptophysin   x 1, Ki-67 x 1, CD56 x 1     ----------------------------------------------------------------     CYTOLOGIC INTERPRETATION:     A.  Lymph node, 11L ,endobronchial ultrasound guided fine needle   aspiration:   Negative for malignancy Polymorphous population of lymphocytes   Specimen Adequacy: Satisfactory for evaluation.     B.  Lung, left upper lobe , endobronchial ultrasound guided fine needle   aspiration:   Poorly differentiated   high grade malignancy, favor small cell carcinoma (see  comment) .   Specimen Adequacy: Satisfactory for evaluation.     COMMENT:   B:  The cytologic preparations show a proliferation of cells with   irregular nuclei and  high N:C ratios. Granular nuclei can be seen in some cell clusters, and mitotic figures are  present.  Necrosis an degeneration of tumor is noted.  Due to the necrosis and degeneration, these features while concerning for a malignancy, do not display a definitive morphology for classification.  The following immunohistochemical stains are performed to better characterize the cells, with appropriate controls:     TTF-1: negative   P40: negative   CD45: negative   Chromogranin: negative   Synaptophysin: positive   Ki67: >95% positive in viable tumor   CD56: diffusely positive     Based on the morphology, the findings are consistent with a poorly   differentiated malignancy.  The   immunostaining pattern favors a high grade neuroendocrine (small cell)   carcinoma.   Dr. CARIDAD Engle has seen specimen B and concurs with the diagnosis and above    comment.     I have personally reviewed all specimens and/or slides, including the   listed special stains, and used them   with my medical judgement to determine or confirm the final diagnosis.     Electronically signed out by:   Pascual Jalloh M.D., [facility]     CLINICAL HISTORY:   74 year old female with COPD and a left upper lobe lesion concerning for   malignancy.  Found via CT in 9/2018.   Treatment was delayed at the time.     ,     GROSS:   A. Lymph node, 11L ,endobronchial ultrasound guided fine needle   aspiration:  Received are 3 air dried slides,   processed for Diff Quik stain, and material in formalin, processed for one    hematoxylin stained cell block.     B. Lung, left upper lobe , endobronchial ultrasound guided fine needle   aspiration:  Received are 2 air dried   slides, processed for Diff Quik stain, and material in formalin, processed    for one hematoxylin stained cell   block. Material  in RPMI submitted for Flow Cytometry.     INTRAOPERATIVE CONSULTATION:   FNA Performance: Fine needle aspiration was not performed by [facility],    Pathology staff.   Immediate Adequacy: On site specimen adequacy evaluation was performed by   Dr. BENITO Brooks MD via telepathology.     Onsite adequacy/interpretation:   Pass A1-A2 put directly into formalin, Pass A3 inadequate, Pass A4-A5 put   directly into formalin, Pass A6   inadequate, Pass A7 put directly into formalin, Pass A8 adequate   Pass B1 inadequate, Pass B2 adequate, Pass B3-B5 put directly into   formalin.     MICROSCOPIC:   Microscopic examination was performed.   Mahsa Clemente M.D, PGY-2 Resident   Pascual Jalloh MD, Attending     CPT Codes:   A: 08510-VWZZ-OYM, 24978-MJWJ-UBH, 90009-EVSF, 04217-GBF, HCB   B: 97830-BJFY-RGQ, 24917-UQTL-CTU, 87926-SZZX, 45904-PMS, HCB, 85904-MMM,   61606-DUD, 79263-HPA, 12726-SQX,   79233-HSY, 58131-HMG, 68506-EXA     COLLECTION SITE:   Client:  [facility]   Location:  [facility] (B)     The technical component of this testing was completed at the [facility], with the professional component performed    at the [facility], [address] [place] [phone] ([phone])     Resident   AXT5      Results for orders placed or performed during the hospital encounter of 06/17/19   PET Oncology Whole Body    Narrative    PET/CT SKULL BASE TO MID THIGH DIAGNOSTIC WITH IV CONTRAST  6/17/2019  4:46 PM     HISTORY: Small cell lung cancer, left upper lobe (H).    COMPARISON EXAMS:  [facility]: None.  [facility] PET/CT 12/11/2018.  OTHER: None.    TECHNIQUE:  11.1 mCi of F-18 FDG were administered  intravenously.   Following the uneventful administration of 100 mL Isovue 370  intravenous contrast and oral contrast, a PET/CT scan was performed  from the  skull base through the mid thigh.   A diagnostic CT scan was  performed of the chest, abdomen and pelvis. Coronal, sagittal and  axial images were created and fused with the contrast enhanced CT  examination.  Blood glucose: 93 mg/dL.  The CT, PET and fusion images  are then evaluated on a [employer] workstation.  Radiation dose for  this scan was reduced using automated exposure control, adjustment of  the mA and/or kV according to patient size, or iterative  reconstruction technique.    FINDINGS: Normal physiologic uptake is identified within the salivary  glands, myocardium, kidneys, ureters and bladder.  Scattered areas of  physiologic bowel uptake are also present.    NECK:  Lymph nodes: No pathologic activity. No enlarged cervical lymph nodes.    Additional findings: None.    CHEST:  Lungs: Left upper lobe lung mass measures 5.1 x 4.4 cm with an SUV max  20.3, previously measuring 4.4 x 3.8 cm on the more recent chest CT.  PET/CT exam demonstrated the left upper lobe lung mass to measure 3.3  x 3.3 cm with an SUV max 15.4. Findings would indicate interval  progression of patient's left upper lobe lung cancer. A few small  satellite nodules are noted in the left upper lobe, minimally changed  since recent chest CT but new since prior PET/CT exam. A new  subpleural left lower lobe lung nodule is noted on image 124 of series  8, not evident on prior exams. Metastatic lesion is likely. Similar  tiny lesion is noted in the anterior right lower lobe near the major  fissure on image 94. Probable right major fissure lymph node is noted  on image 89 and is stable. Mild interlobular septal thickening is  noted in the upper lobes, new since prior exam. Centrilobular  emphysema is again noted.    Lymph nodes: Bilateral hypermetabolic axillary lymph nodes are  present. Right axillary lymph node on series 5, image 97 measures 2.0  x 1.3 cm with an SUV max 4.1, and the left axilla demonstrates a lymph  node on image 95  measuring 2.2 x 1.3 cm with an SUV max 3.7 consistent  with metastatic axillary adenopathy. Additional hypermetabolic nodes  are noted in the right and left axilla. Previously noted activity near  the right clavicular head has resolved. Hypermetabolic left  peribronchial lymph node on image 117 measures 1.3 x 0.8 cm with an  SUV max 4.5, previously 1.0 x 0.9 cm with an SUV max 3.3. Left hilar  lymph node on image 117 measures 1.1 x 1.1 cm with an SUV max 10.7,  previously 0.8 x 0.8 cm with an SUV max 3.4. No other hypermetabolic  intrathoracic lymph nodes are appreciated.    Additional findings: No pleural or pericardial fluid. Heart is normal  in size. The esophagus is unremarkable. Calcified plaque is noted in  the aorta and coronary arteries.    ABDOMEN/PELVIS:  Hepatobiliary: No pathologic activity. There is a new tiny hypodensity  posterior right hepatic dome not appreciated on prior exam measuring  0.4 cm but is too small to further characterize by CT or PET imaging.  No other liver lesions are appreciated. No calcified gallstones.    Spleen: No pathologic activity. No enlargement or mass.    Pancreas: No pathologic activity. No evidence of a mass or surrounding  inflammation.    Kidneys: No pathologic activity. Probable tiny cyst upper pole right  kidney is unchanged. No hydronephrosis. Bladder is decompressed but  otherwise unremarkable.    Adrenals: No pathologic activity. No adrenal mass.    Reproductive: No pathologic activity. Uterus is unremarkable. No  adnexal mass.    Gastrointestinal: No pathologic activity. No bowel obstruction.    Lymph nodes: No pathologic activity. Previously noted left periaortic  lymph node now measures 0.7 cm on series 5, image 204, previously 1.0  cm. No associated hypermetabolism. No enlarged abdominal or pelvic  lymph nodes.    Additional findings: Extensive calcified plaque is noted in the aorta  without aneurysm or dissection. Lower extremities are  unremarkable.    SKELETON:   No pathologic activity.      Impression    IMPRESSION:  1. Interval progression of patient's left upper lobe lung cancer with  worsening hypermetabolic adenopathy in the right and left axilla, left  hilum and left peribronchial region of the mediastinum.   2. No evidence of metastatic disease in the neck, abdomen or pelvis.  Small retroperitoneal nodes are stable if not smaller in size.  3. Several tiny new bilateral lung nodules, possibly metastatic  disease but too small to characterize by PET imaging. Follow-up CT  imaging as needed to assess for any interval change.     ARNOLD LAWSON MD     MRI OF THE BRAIN WITHOUT AND WITH CONTRAST 6/14/2019 7:05 AM      COMPARISON: None.     HISTORY:  Small cell lung cancer, left (H).      TECHNIQUE: Axial diffusion-weighted with ADC map, axial T2-weighted  with fat saturation, axial T1-weighted, axial turboFLAIR and coronal  T1-weighted images of the brain were acquired without intravenous  contrast.  Following intravenous administration of gadolinium (5 mL  Gadavist), axial T1-weighted images of the brain were acquired.      FINDINGS: There is mild diffuse cerebral volume loss. There are  numerous tiny scattered focal areas of abnormal T2 signal  hyperintensity in the cerebral white matter bilaterally that are  consistent with sequela of chronic small vessel ischemic disease.     The ventricles and basal cisterns are within normal limits in  configuration given the degree of cerebral volume loss.  There is no  midline shift.  There are no extra-axial fluid collections.  There is  no evidence for stroke or acute intracranial hemorrhage.      There is vascular enhancement in the deep white matter at the  posterior aspect of the right frontal lobe best seen on the coronal  postcontrast images that likely represents a small venous angioma.  There is no abnormal contrast enhancement in the brain or its  coverings.     There is no sinusitis or  mastoiditis.                                                                      IMPRESSION:  Diffuse cerebral volume loss and cerebral white matter  changes consistent with chronic small vessel ischemic disease. No  evidence for acute intracranial pathology. No evidence for  intracranial metastases.     IMMANUEL BAIRES MD     Imaging was personally reviewed and I showed Roxana and her daughter the images too.     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 Has bilateral hypermetabolic nodules, making this extensive stage. Discussed some the nuances of the biopsy results. Explained that the goals of care are to control the cancer for as long as possible while maintaining good QOL, but that treatment is not curative. Recommended carboplatin AUC 5 (D1), etoposide 100 mg/m2 (D1-3), atezolizumab (D1). Reviewed potential drug specific and general side effects. Would do 2 cycles and restage, completing up to 4 cycles in the absence of significant toxicity and progression, followed by maintenance atezolizumab. Discussed typically high response rate but that it might be less given the non-classic morphology. Reviewed the other option of choosing not to treat the cancer. Emphasized that we would continue to follow and care for her if she chose that. Also discussed that if, at any point, short of sudden/severe issues, if she wanted to stop treatment at any time for any reason, we would support her in that. At the end of our 45 minute discussion, she will think about it a bit and get back to us with a decision. I offered a second opinion but she felt she had enough information to make a decision. We scheduled infusion room appts for her before today's appointment, and can cancel if not needed.     ADDENDUM: Called back about an hour after our appointment and has chosen to move forward. Will  proceed with treatment tomorrow, finishing Monday. Will need neulasta on pro. I'll ask her to return for a toxicity check either at the end of next week or early the following week. I'll see her prior to C3 with restaging CT CAP.    Coping: Obviously and understandably having a very hard time. Her hope is that this cancer just goes away and she won't have to worry about it anymore. She finds it unbelievable that it's there. I encouraged her to establish care with Palliative Care, to add another layer of support through this. She's agreeable.     Tobacco dependence: She's cut down quite a bit. I think she's ready to quit. Discussed the tobacco cessation program we have here and its benefits. She has a prescription for varinicline through her PCP. We'll follow up on this at the next visit, but encouraged her to quit as we have data showing that people who are unable to quit tend to have poorer outcomes.    Mild hyperthyroidism: Has H/o Hashimoto's and is on levothyroxine. Will recheck over the next several weeks and adjust levothyroxine dose if remains elevated.     COPD: Breathing ok.     Social: She really hopes to spend hernández and maybe more in [facility], where her other daughter lives. We'll find a provider there. [place] is about 4 hours away, so [place] would be another option.     A total of 65 minutes was spent with the patient, >50% of which was spent in counseling and coordination of care.    Candy Stubbs MD    Hematology, Oncology and Transplantation

## 2019-06-19 NOTE — NURSING NOTE
Chief Complaint   Patient presents with     Blood Draw     Labs drawn via  by RN in lab. Lab appt only.      Jim Mullen RN

## 2019-06-20 NOTE — PROGRESS NOTES
Met with Roxana, to discuss chemotherapy and resources available at the [facility].  Provided patient with  My Cancer Guidebook , and [email or web address] printouts on Carbo, Etoposide, Atezo.  Reviewed administration, side effects and ongoing symptom support management by APPs in clinic. Provided phone numbers for triage and after hours care. Highlighted steps to expect when getting chemotherapy (check in, labs, pre meds, infusion).  Discusses that chemo may be delayed by a day or two due to blood counts, infusion schedule or patient s need to modify.  Included a one page resource of symptoms and when to contact the Cancer Clinic with questions.

## 2019-06-20 NOTE — PATIENT INSTRUCTIONS
Contact Numbers    [facility] Main Line: [phone]  [facility] Triage and after hours / weekends / holidays:  [phone]      Please call the triage or after hours line if you experience a temperature greater than or equal to 100.5, shaking chills, have uncontrolled nausea, vomiting and/or diarrhea, dizziness, shortness of breath, chest pain, bleeding, unexplained bruising, or if you have any other new/concerning symptoms, questions or concerns.      If you are having any concerning symptoms or wish to speak to a provider before your next infusion visit, please call your care coordinator or triage to notify them so we can adequately serve you.     If you need a refill on a narcotic prescription or other medication, please call before your infusion appointment.

## 2019-06-20 NOTE — PROGRESS NOTES
Infusion Nursing Note:  Amberly Palafox presents today for Cycle 1 Day 1 Tecentriq, Carboplatin, Etoposide.    Patient seen and examined by Dr Stubbs in clinic on 6/19/19    Note:   Today was pt's first chemotherapy infusion.  Erendira Dsouza, care coordinator, reviewed information on tecentriq, carboplatin, and etoposide with pt.  Pt was also given written information on expected side effect of these drugs.  This information was reiterated by this RN and all questions were answered.    Pt has the numbers for triage and the after hour oncall physician.  Pt is aware to call triage or oncall physician  immediately with a temperature of 100.4 or greater.  Pt was given a thermometer to take home.     Intravenous Access:  Peripheral IV placed.    Treatment Conditions:  Lab Results   Component Value Date    HGB 11.7 06/19/2019     Lab Results   Component Value Date    WBC 5.7 06/19/2019      Lab Results   Component Value Date    ANEU 4.4 06/19/2019     Lab Results   Component Value Date     06/19/2019      Lab Results   Component Value Date     06/19/2019                   Lab Results   Component Value Date    POTASSIUM 4.0 06/19/2019           Lab Results   Component Value Date    MAG 2.2 05/17/2019            Lab Results   Component Value Date    CR 0.54 06/19/2019                   Lab Results   Component Value Date    JASPAL 8.8 06/19/2019                Lab Results   Component Value Date    BILITOTAL 0.5 06/19/2019           Lab Results   Component Value Date    ALBUMIN 3.7 06/19/2019                    Lab Results   Component Value Date    ALT 15 06/19/2019           Lab Results   Component Value Date    AST 17 06/19/2019       Results reviewed, labs MET treatment parameters, ok to proceed with treatment.      Post Infusion Assessment:  Patient tolerated infusion without incident.       Discharge Plan:   Prescription refills given for zofran and compazine.   Fernando Maxwell from pharmacy counseled pt on  these medications.     Patient will return tomorrow  for cycle 1 day 2  Face to Face time: 0.    Amelie Whitfield RN

## 2019-06-21 NOTE — PATIENT INSTRUCTIONS
Contact Numbers    [facility] Main Line: [phone]  [facility] Triage and after hours / weekends / holidays:  [phone]      Please call the triage or after hours line if you experience a temperature greater than or equal to 100.5, shaking chills, have uncontrolled nausea, vomiting and/or diarrhea, dizziness, shortness of breath, chest pain, bleeding, unexplained bruising, or if you have any other new/concerning symptoms, questions or concerns.      If you are having any concerning symptoms or wish to speak to a provider before your next infusion visit, please call your care coordinator or triage to notify them so we can adequately serve you.     If you need a refill on a narcotic prescription or other medication, please call before your infusion appointment.                 June 2019 Sunday Monday Tuesday Wednesday Thursday Friday Saturday                                 1       2     3     4     5    MYC PRE OPERATIVE PHYSICAL SB3  10:15 AM   (30 min.)   [facility]   [facility]     7    Admission   9:50 AM   Pedro Luis Us MD   Same Day Surgery [facility]   (Discharge: 6/7/2019)    BRONCHOSCOPY, WITH ENDOBRONCHIAL ULTRASOUND  12:10 PM   Pedro Luis Us MD   [facility] OR    XR CHEST PORT 1 VIEW   2:55 PM   (20 min.)   [id number]   [facility],  Radiology    XR CHEST PORT 1 VIEW   4:25 PM   (20 min.)   [id number]   [facility],  Radiology 8       9     10     11     12     13     14    MR BRAIN WWO   6:30 AM   (45 min.)   SHMRP1   [facility] MRI 15       16     17    PE EYE/TH ONCOLOGY   2:45 PM   (45 min.)   SHPETM1   [facility] PET CT 18     19    [facility] LAB DRAW  11:30 AM   (15 min.)    MASONIC LAB DRAW   [facility] Lab Draw    [facility] NEW  11:45 AM   (60 min.)   Candy Stubbs MD   [facility] LAB DRAW   2:15 PM   (15 min.)    [facility]RAW   [facility] 20    [facility] ONC INFUSION 180   7:00 AM    (180 min.)    ONCOLOGY INFUSION   [facility] 21    [facility] ONC INFUSION 120   8:30 AM   (120 min.)    ONCOLOGY INFUSION   [facility] 22       23     24    [facility] ONC INFUSION 120   3:00 PM   (120 min.)    ONCOLOGY INFUSION   [facility] 25    [facility] NEW  10:45 AM   (60 min.)   Es Palma MD   [facility] 26     27     28 29 30 July 2019 Sunday Monday Tuesday Wednesday Thursday Friday Saturday        1     2    [facility] LAB DRAW   4:30 PM   (15 min.)   [facility] D[facility]   [facility] Lab Draw    [facility] RETURN   4:55 PM   (50 min.)   Norma Crespo, CARLY CNP   [facility] 3     4     5     6       7     8     9     10     11     12     13       14     15     16     17     18     19     20       21     22     23     24     25     26     27       28     29     30     31                                    Lab Results:  No results found for this or any previous visit (from the past 12 hour(s)).

## 2019-06-21 NOTE — PROGRESS NOTES
"SPIRITUAL HEALTH SERVICES  SPIRITUAL ASSESSMENT Progress Note  [facility]     REASON FOR ENCOUNTER: Introduction to [facility]      Reviewed documentation and had introductory visit with \"Roxana\" which included a reflective conversation incorporating elements of illness and family narratives.    Roxana identifies as \"spiritual\" noting that when she was young, \"I visited all the churches and decided, they are all more or less the same.\"     Roxana reflected on the \"shock\" at her initial diagnosis with \"cancer\" and the path to \"acceptance\" (\"At first, I didn't want to tell anyone that I had the 'c-word'\"). But having accepted it now, she is finding it helpful to begin chemotherapy which enables her to focus on \"ridding my body of the cancer.\"    Roxana became momentarily emotional while reflecting on how \"unbelievable\" her two daughters and friends have been in supporting her. One daughter (Estefani) recently moved to [place] and Roxana was hoping to join her - and still might, after she finishes treatment - but for now is living with her other daughter (Adriana) who lives locally.     She is starting to return to a yoga practice that she had begun a long time ago but gotten away from (\"Life happens...\"). She finds yoga to be particularly helpful for \"balance.\" She has lately been visualizing yoga on the beach.    Roxana expects to have series of \"Lblfmqjx-Ydcase-Bpgkqt\" chemo treatments followed by 18 days of off. She welcomes ongoing  support at her infusion appointments.      Prashanth Yen MDiv  Chaplain Resident  Pager [phone]  Cell [phone]    "

## 2019-06-21 NOTE — PROGRESS NOTES
Infusion Nursing Note:  Amberly Palafox presents today for Cycle 1, Day 2 Etoposide.    Patient seen by provider today: No   present during visit today: Not Applicable.    Note: Pt assessed prior to initiating day 2 treatment. Pt denies any new issues or symptoms.    Intravenous Access:  Peripheral IV placed.    Treatment Conditions:  Labs from 6/19   Results reviewed, labs MET treatment parameters, ok to proceed with treatment.    Post Infusion Assessment:  Patient tolerated infusion without incident.  Blood return noted pre and post infusion.  Site patent and intact, free from redness, edema or discomfort.  No evidence of extravasations.  Access discontinued per protocol.     Discharge Plan:   Patient declined prescription refills.  Copy of AVS reviewed with patient and/or family.  Patient will return 6/24/19 for next appointment.  Patient discharged in stable condition accompanied by: self.  Departure Mode: Ambulatory.    Roxane Drew RN

## 2019-06-24 NOTE — PROGRESS NOTES
Infusion Nursing Note:  Amberly Palafox presents today for C1D3 Etoposide.    Patient seen by provider today: No   present during visit today: Not Applicable.    Note: Endorses generalized fatigue. Denies fever/chills nor any signs of infection. Denies nausea/vomiting nor chest and abdominal discomfort. No new concerns made. Otherwise well.    Intravenous Access:  Peripheral IV placed by Vascular Access.    Treatment Conditions:  Not Applicable.    Neulasta Onpro On-Body injector applied to left abdomen at 6/24/19 1600h with light facing belly button.  Writer discussed Neulasta injection would start on 6/25/19 at 1900H, approximately 27 hours after application applied today.  Written and Verbal instruction reviewed with patient.  Pt instructed when the dose delivery starts, it will take about 45 minutes to complete.  Pt aware Neulasta Onpro On-Body should have green flashing light and to call triage or on-call MD if injector flashes red or appears to be leaking. Pt aware to keep Onpro On-Body Neulasta 4 inches away from electrical equipment and to avoid showering 4 hours prior to injection.   Neulasta Onpro Lot number: [id number]      Post Infusion Assessment:  Patient tolerated infusion without incident.  Blood return noted pre and post infusion.  Site patent and intact, free from redness, edema or discomfort.  No evidence of extravasations.  Access discontinued per protocol.       Discharge Plan:   Patient declined prescription refills.  Discharge instructions reviewed with: Patient.  Patient and/or family verbalized understanding of discharge instructions and all questions answered.  Copy of AVS reviewed with patient and/or family.  Patient will return 7/2/19 for next provider appointment.  Patient discharged in stable condition accompanied by: self.  Departure Mode: Ambulatory.  Face to Face time: 5.    AMARILIS LINN RN

## 2019-06-24 NOTE — PATIENT INSTRUCTIONS
Contact Numbers  [facility]: [phone]    After Hours:  [phone]  Triage: [phone]    Please call the [facility] Triage line if you experience a temperature greater than or equal to 100.5, shaking chills, have uncontrolled nausea, vomiting and/or diarrhea, dizziness, shortness of breath, chest pain, bleeding, unexplained bruising, or if you have any other new/concerning symptoms, questions or concerns.     If it is after hours, weekends, or holidays, please call the main hospital  at  [phone] and ask to speak to the Oncology doctor on call.     If you are having any concerning symptoms or wish to speak to a provider before your next infusion visit, please call your care coordinator or triage to notify them so we can adequately serve you.     If you need a refill on a narcotic prescription or other medication, please call triage before your infusion appointment.         June 2019 Sunday Monday Tuesday Wednesday Thursday Friday Saturday                                 1       2     3     4     5    MYC PRE OPERATIVE PHYSICAL SB3  10:15 AM   (30 min.)   Josiane Edwards MD   [facility] 6     7    Admission   9:50 AM   Pedro Luis Us MD   Same Day Surgery [facility]   (Discharge: 6/7/2019)    BRONCHOSCOPY, WITH ENDOBRONCHIAL ULTRASOUND  12:10 PM   Pedro Luis Us MD   [facility] OR    XR CHEST PORT 1 VIEW   2:55 PM   (20 min.)   [id number]   [facility],  Radiology    XR CHEST PORT 1 VIEW   4:25 PM   (20 min.)   [id number]   [facility],  Radiology 8       9     10     11     12     13     14    MR BRAIN WWO   6:30 AM   (45 min.)   SHMRP1   [facility] MRI 15       16     17    PE EYE/TH ONCOLOGY   2:45 PM   (45 min.)   SHPETM1   [facility] PET CT 18     19    [facility] LAB DRAW  11:30 AM   (15 min.)    M[facility] LAB DRAW   [facility] Lab Draw    [facility] NEW  11:45 AM   (60 min.)   Candy Stubbs MD   [facility] Cancer  Clinic    [facility] LAB DRAW   2:15 PM   (15 min.)    [facility] LAB DRAW   [facility] Lab Draw 20    [facility] ONC INFUSION 180   7:00 AM   (180 min.)    ONCOLOGY INFUSION   [facility] 21    [facility] ONC INFUSION 120   8:30 AM   (120 min.)    ONCOLOGY INFUSION   [facility] 22       23     24    [facility] ONC INFUSION 120   3:00 PM   (120 min.)    ONCOLOGY INFUSION   [facility] 25    [facility] NEW  10:45 AM   (60 min.)   Es Palma MD   [facility] 26     27     28     29       30 July 2019 Sunday Monday Tuesday Wednesday Thursday Friday Saturday        1     2    [facility] LAB DRAW   4:30 PM   (15 min.)    MASON[facility] LAB DRAW   [facility] Lab Draw    [facility] RETURN   4:55 PM   (50 min.)   Norma Crespo APRN CNP   [facility] 3     4     5     6       7     8     9     10     11     12     13       14     15     16     17     18     19     20       21     22     23     24     25     26     27       28     29     30     31                                    Lab Results:  No results found for this or any previous visit (from the past 12 hour(s)).       Neulasta injection would start on 6/25/19 at 1900 H, approximately 27 hours after application applied today.  Dose delivery starts, it will take about 45 minutes to complete.  Neulasta Onpro On-Body should have green flashing light and to call triage or on-call MD if injector flashes red or appears to be leaking. Keep Onpro On-Body Neulasta 4 inches away from electrical equipment and to avoid showering 4 hours prior to injection.

## 2019-06-25 NOTE — PROGRESS NOTES
"Palliative Care Outpatient Clinic Consultation Note    Patient:  Amberly Palafox 74 year old female who is presenting to the palliative medicine clinic today at the request of Dr. Stubbs for support with small cell lung cancer.       Chief Complaint:   Lung cancer  fatigue    History of Present Illness:  75 yr old female with newly dx SCLC. She was initially found to have lung mass in 9/2018 but at that time, she was in denial and scared and didn't tell anyone about the mass. In May, 2019, she was ready for follow up and had scans done and found to have extensive lung cancer and elected for palliative treatment of carboplatin, etoposide, atezolizumab.  Plan is for 2 cycles, the restage and complete 4 cycles unless sig toxicities or progression followed by maintenance atezolizumab.     Roxana tells me that she isn't sure why she is here today as she feels great and \"this (pointing to her lungs) thing is going to go away and that is all she is focused on and nothing more to talk about\".  She does complain of a sore throat for past few days but gargling with warm salt water.     No energy for past month. She expected lack of energy with chemo but yesterday went to bed at 7:30 and slept until 6am. She wonders if this is normal and also recognizes that she has been exhausted for past month. She is wondering how she can help herself feel less tired. She wants to know if she can she exercise?  Before past 2 hernández, would walk dog twice a day. This winter couldn't take dog walking because of snow and ice so just had to let it out back door. She is hoping to be well enough to be able to do yoga at the beach. [phone] was doing yoga and felt so good. Felt more relaxed and healthy. Last time did yoga was 2016. Quit \"because she is an idiot\". Daughter does yoga and runs and eats right and she is living with this daughter now and that's wonderful because her daughter motivates her to want to be " "better. Daughter so incredibly supportive and demanded that Roxana live with her until she gets better and Roxana feels so cared for by her daughter, Adriana.     No nausea - has the bottles from pharmacist that she got during first chemo but hasn't needed any medication for nausea.     Appetite is good but she is hoping to be able to drink celery drink as she has read that it can cure cancer. No constipation.     Taking co-q 10; fish oil; stopped the statins due to leg cramps. D, B vitamins as well. Spiriva for lungs, albuterol since the winter.     Review of Systems:  ROS: 10 point ROS neg other than the symptoms noted above in the HPI     Patient's Disease Understanding:  \"this (pointing to her lungs) thing is going to go away and that is all she is focused on and nothing more to talk about\". She feels like she is an idiot and stupid for not following up when it was first seen. She also wonders why she can't have a scan after first round of chemo. She really likes Dr. Stubbs and appreciates how sarabjit she is and how supportive how Dr. Stubbs is.     Functional Status: independent of all ADLs. Having her son-in-law drive her to appointments.     Social History  Living Situation: living with her daughter, Adriana, now. Was in [place] in her home x 20 years, now with with daughter in [place];   Support System: other daughter, Estefani, is in [place] with only granddaughter who just graduated from college from [employer]. Foodie friends and best friend an feels like she has lots of support from friends;  1986 and moved to [place] in 1993 because daughter moved here (both daughters were in [place] for a while)..   Occupation: worked at [employer] and last day was June 2nd and was planning on moving to [place] but now all that has obviously changed.  Spending days now - reading everything about cancer, reading book about the new celery juice cure.     Mother alive age 90; half brothers and sister. Half brother " alcoholic; Raised by grandmother.     Substance Use/History of misuse:+ tobacco x 55 yrs; has cut it down to 4-5 cigs a day.  No hx of etoh or drug abuse    Opioid Risk Tool (ORT):    Family History of Substance Abuse:        Alcohol = 1 pt (yes, female)       Illegal Drugs = 0 pt (no)       Prescription Drugs = 0 pt (no)      Personal History of Substance Abuse:       Alcohol = 0 pt (no)       Illegal Drugs = 0 pt (no)       Prescription Drugs = 0 pt (no)      Family History  Family History   Problem Relation Age of Onset     Thyroid Disease Mother         hypothyroid     Heart Disease Mother         s/p stents     Hypertension Mother      Thyroid Disease Daughter         hypothyroid     Heart Disease Maternal Grandmother 94     Lupus Sister          Advance Care Planning:  Advance Directive:    none  Code status full    No Known Allergies  Current Outpatient Medications   Medication Sig Dispense Refill     albuterol (PROAIR HFA/PROVENTIL HFA/VENTOLIN HFA) 108 (90 Base) MCG/ACT inhaler Inhale 2 puffs into the lungs every 4 hours as needed for shortness of breath / dyspnea 18 g 3     atorvastatin (LIPITOR) 20 MG tablet   3     B Complex-C (SUPER B COMPLEX PO) Take 1 tablet by mouth daily       cholecalciferol (VITAMIN D3) 5000 units CAPS capsule Take by mouth daily       FISH OIL        levothyroxine (SYNTHROID/LEVOTHROID) 150 MCG tablet Take 1 tablet (150 mcg) by mouth daily 90 tablet 1     Multiple Vitamins-Minerals (MULTIVITAMIN OR) Take  by mouth.       ondansetron (ZOFRAN) 8 MG tablet Take 1 tablet (8 mg) by mouth every 8 hours as needed for nausea 30 tablet 11     prochlorperazine (COMPAZINE) 10 MG tablet Take 1 tablet (10 mg) by mouth every 6 hours as needed (Nausea/Vomiting) 30 tablet 11     tiotropium (SPIRIVA RESPIMAT) 2.5 MCG/ACT inhaler Inhale 2 puffs into the lungs daily Please dispense 3 months supply 12 g 3     varenicline (CHANTIX CONTINUING MONTH RANDAL) 1 MG tablet Take 1 tablet (1 mg) by mouth 2  "times daily (Patient not taking: Reported on 6/19/2019) 60 tablet 1     varenicline (CHANTIX RANDAL) 0.5 MG X 11 & 1 MG X 42 tablet Take 0.5 mg tab daily for 3 days, THEN 0.5 mg tab twice daily for 4 days, THEN 1 mg twice daily. (Patient not taking: Reported on 6/19/2019) 53 tablet 0     Past Medical History:   Diagnosis Date     Symptomatic menopausal or female climacteric states      Unspecified hypothyroidism      Past Surgical History:   Procedure Laterality Date     BRONCHOSCOPY RIGID OR FLEXIBLE W/TRANSENDOSCOPIC ENDOBRONCHIAL ULTRASOUND GUIDED N/A 6/7/2019    Procedure: Radial Endobronchial Ultrasound;  Surgeon: Pedro Luis Us MD;  Location: [facility] OR     no surgeries             Vital signs:    , There were no vitals taken for this visit.  Estimated body mass index is 21.19 kg/m  as calculated from the following:    Height as of 6/19/19: 1.575 m (5' 2.01\").    Weight as of 6/19/19: 52.6 kg (115 lb 14.4 oz).   /69 (BP Location: Right arm, Patient Position: Chair, Cuff Size: Adult Regular)   Pulse 98   Temp 99.7  F (37.6  C) (Oral)   Resp 14   Ht 1.575 m (5' 2.01\")   Wt 53.1 kg (117 lb)   SpO2 98%   BMI 21.39 kg/m      General: alert, well groomed, appears slightly older than stated age, in NAD  Eyes: EOMI, sclera clear  HEENT: NC/AT; mucous membranes moist; oral pharynx - slight erythema, no exudates  Lungs: no increased work of breathing, speaking full sentences  Neuro: A&O x 3; CN II-XII grossly intact; gait independent, stable  Neuropsych: alert, good eye contact, engaged, pleasant; sensorium intact      Data Reviewed:  GFR > 90; alb 3.7  TSH 1.36  T4 free 1.66  PET scan 6/17:  IMPRESSION:  1. Interval progression of patient's left upper lobe lung cancer with  worsening hypermetabolic adenopathy in the right and left axilla, left hilum and left peribronchial region of the mediastinum.   2. No evidence of metastatic disease in the neck, abdomen or pelvis. Small retroperitoneal nodes are stable " if not smaller in size.  3. Several tiny new bilateral lung nodules, possibly metastatic  disease but too small to characterize by PET imaging.     Impressions:    Newly dx extensive lung cancer    Recommendations & Counseling:    Fatigue:   - encouraged walking, exercise as able.  - discussed diet. Pt wanting to try celery juice. I supported that as long as she thought the juice tasted good and was a supplement to healthy eating and drinking. If she does not like the taste of the juice, forcing herself to drink it, I recommended stopping the juice.     I encouraged palliative care  support. Roxana will think about it.     Follow up in 4-6 weeks     Thank you for involving us in the patient's care. 60 minutes face time over half spent counseling.  Es Palma MD / Palliative Medicine / Pager [phone] / After-Hours Answering Service [phone] / Main Palliative Clinic - [phone] Inpatient Team Consult Pager [phone] (answered 8am-430pm M-F)

## 2019-06-25 NOTE — NURSING NOTE
"Oncology Rooming Note    June 25, 2019 10:47 AM   Amberly Palafox is a 74 year old female who presents for:    Chief Complaint   Patient presents with     Oncology Clinic Visit     [facility] NEW- SMALL CELL LUNG CA     Initial Vitals: /69 (BP Location: Right arm, Patient Position: Chair, Cuff Size: Adult Regular)   Pulse 98   Temp 99.7  F (37.6  C) (Oral)   Resp 14   Ht 1.575 m (5' 2.01\")   Wt 53.1 kg (117 lb)   SpO2 98%   BMI 21.39 kg/m   Estimated body mass index is 21.39 kg/m  as calculated from the following:    Height as of this encounter: 1.575 m (5' 2.01\").    Weight as of this encounter: 53.1 kg (117 lb). Body surface area is 1.52 meters squared.  No Pain (0) Comment: Data Unavailable   No LMP recorded. Patient is postmenopausal.  Allergies reviewed: Yes  Medications reviewed: Yes    Medications: Medication refills not needed today.  Pharmacy name entered into [employer]:    [facility] [address] [place] [address] [address] [facility]  [facility]  FOR [facility] [place] - [address] [facility]    Clinical concerns: No new concerns. Minerva was notified.      Anmol Zimmerman LPN            "

## 2019-06-26 NOTE — TELEPHONE ENCOUNTER
Received fax from [facility] that patient requesting 3 albuterol inhalers at a time. New rx sent.    Luz Lopez MD  Internal Medicine/Pediatrics

## 2019-06-29 NOTE — TELEPHONE ENCOUNTER
"    Reason for Disposition    [1] Neutropenia known or suspected (e.g., recent cancer chemotherapy) AND [2] fever > 100.4 F (38.0 C)    Additional Information    Negative: Shock suspected (e.g., cold/pale/clammy skin, too weak to stand, low BP, rapid pulse)    Negative: Difficult to awaken or acting confused (e.g., disoriented, slurred speech)    Negative: Bluish (or gray) lips or face now    Negative: New onset rash with many purple (or blood-colored) spots or dots    Negative: Sounds like a life-threatening emergency to the triager    Negative: Other symptom is present, see that guideline  (e.g., symptoms of cough, runny nose, sore throat, earache, abdominal pain, diarrhea, vomiting)    Negative: Fever > 104 F (40 C)    Answer Assessment - Initial Assessment Questions  1. TEMPERATURE: \"What is the most recent temperature?\"  \"How was it measured?\"       100.4  2. ONSET: \"When did the fever start?\"       today  3. SYMPTOMS: \"Do you have any other symptoms besides the fever?\"  (e.g., sore throat, earache, runny nose, cough, rash, diarrhea, vomiting, abdominal pain, painful urination)      Slight sore throat, some cough  4. CONTACTS: \"Does anyone else in the family have an infection?\"      no  5. FEVER TREATMENT: \"What have you done so far to treat this fever?\" (e.g., medications)      no  6. CANCER: \"What type of cancer do you have?\"      Small cell lung CA  7. CANCER - TREATMENT: \"What cancer treatments have you received?\" \"When did you last receive them?\" (e.g., recent surgery, radiation, or chemotherapy). If triager has access to the patient's medical record, triager should review treatments and administration dates.      Chemo last Monday  8. CANCER - NEUTROPENIA RISK: \"Have you received chemotherapy recently? If Yes, \"When was it and what was your last CBC and ANC (absolute neutrophil count)?\" \"Were you told that your white cell count was low?\" If triager has access to the patient's medical record, triager " should review most recent labs. An ANC less than 1,000 - 1,500 means that the neutrophils are low and the immune system is weak.      Yes    Protocols used: CANCER - FEVER-A-AH

## 2019-07-02 NOTE — PROGRESS NOTES
Called Roxana to see how she is feeling after receiving her first chemo last week and to address her questions about her appt on 7/2/19.    Roxana states that she is taking her temperatures multiple times a day. She called in once over the weekend and was told to go to the ED, but her fever never reached 100.4 so she didn't go.  Today, she had a fever greater than 100.4, so went to [facility] ED- where she had vitals taken and was not febrile. She feels fine.  We discussed not taking her tempeture unless she is feeling unwell as our core temp changes during the day.    She states that she does not have any symptoms and feels fine.     Roxana is not willing to drive in traffic to come to tomorrow's appt with CARLY Colin.  Attempted to explain the goals of the visit, but Roxana states she can only attend appts that are in the AM hours- anything after 12noon she will not be able to do.    Again, offered explanation for appt with CARLY (symptom review, labs, better understanding of how clinic works, having support of another person in clinic), but offered to cancel if she was not going to come.  Roxana is willing to come next week to a morning appt.    Message sent to scheduling to cancel 7/2 appt and rebook at next AM opening.

## 2019-07-08 NOTE — PROGRESS NOTES
Medical Assistant Note:  Amberly Palafox presents today for lab draw.    Patient seen by provider today: No.   present during visit today: Not Applicable.    Concerns: No Concerns.    Procedure:  Lab draw site: LAC, Needle type: BF, Gauge: 23g  With gauze and coban.    Post Assessment:  Labs drawn without difficulty: Yes.    Discharge Plan:  Departure Mode: Ambulatory.    Face to Face Time: 4mins.    Mandy Cloud CMA

## 2019-07-08 NOTE — PROGRESS NOTES
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 3.5 x 2.9 cm ART mass  12/11/18            PET/CT. 3.3 x 3.3 cm ART mass (SUV 15.4), lobular component extending anteriorly and medially, L hilar HEATHER, bilateral axillary HEATHER; 1.8 x 1.1 cm R axillary node (SUV 2.1), 1.9 x 0.9 cm potential LN vs soft tissue nodule immediately adjacent to and anterior to R first rib and clavicle (SUV 3.1). 1 cm L periarotic and a few other small rounded retroperitoneal LN  5/29/19              CT chest w/o contrast. Mucoid impactions vs endobronchial tumor leading to ART mass, 4.4 x 3.8 cm ART mass, 0.3 cm ART nodule, 0.4, 0.3, 0.4 cm ART nodules, new since previous, 1.7 x 1.3 cm R axillary LN, 1.9 x 1.4 cm L axillary LN, additional mildly prominent bilateral axillary, subpectoral and supraclav LN, mediastinal adenopathy.   6/7/19                EBUS/bronch (Dr. Us). No endobronchial lesions. Radial EBUS transbronchial bx of ART mass  6/14/19              Brain MRI negative for mets  6/17/19              PET/CT. 5.1 x 4.4 cm ART mass (SUV 20.3), few small satellite nodules new since Dec, stable since 5/29. New LLL subpleural nodule, RLL nodule, bilateral axillary HEATHER; 2.2 x 1.3 cm L axillary node (SUV 3.7), 2.0 x 1.3 cm R axillary LN (SUV 4.1), additional hypermetabolic axillary nodes, 1.3 x 0.8 cm L peribronchial LN (SUV 4.5), L hilar adenopathy, new 0.4 cm hypodensity R liver, stable L periaortic LN.  6/20/19 cycle 1 Carbo/etoposide/atezolizumab    Interval history: Roxana has been feeling quite well. Had a little fatigue after treatment, but feels her energy is improving. No N/V. No mouth sores. Had low grade fevers without feeling ill after her infusion. Went to the ED. Temp was rechecked and wnl. No recurrence of fevers. Cough is dry, no sob, cp, palpitation. Feels a sensation around the ribs that is not painful. No rash,  bruising. No headaches, vision changes. No focal weakness or neuropahty. No mouth sores. Bowel and bladder function is wnl. Has been feeling anxious at times, but feels this is improving. She sits outside in the AM and listens to the birds which she finds relaxing. She is reading a book on the power of positive thinking and is feeling more hopeful. Hopes to travel to [place] after cycle 2, to visit her daughter for about a week.    Current Outpatient Medications   Medication Sig Dispense Refill     albuterol (PROAIR HFA/PROVENTIL HFA/VENTOLIN HFA) 108 (90 Base) MCG/ACT inhaler Inhale 2 puffs into the lungs every 4 hours as needed for shortness of breath / dyspnea 3 Inhaler 3     atorvastatin (LIPITOR) 20 MG tablet   3     B Complex-C (SUPER B COMPLEX PO) Take 1 tablet by mouth daily       cholecalciferol (VITAMIN D3) 5000 units CAPS capsule Take by mouth daily       FISH OIL        levothyroxine (SYNTHROID/LEVOTHROID) 150 MCG tablet Take 1 tablet (150 mcg) by mouth daily 90 tablet 1     Multiple Vitamins-Minerals (MULTIVITAMIN OR) Take  by mouth.       ondansetron (ZOFRAN) 8 MG tablet Take 1 tablet (8 mg) by mouth every 8 hours as needed for nausea 30 tablet 11     prochlorperazine (COMPAZINE) 10 MG tablet Take 1 tablet (10 mg) by mouth every 6 hours as needed (Nausea/Vomiting) 30 tablet 11     tiotropium (SPIRIVA RESPIMAT) 2.5 MCG/ACT inhaler Inhale 2 puffs into the lungs daily Please dispense 3 months supply 12 g 3     varenicline (CHANTIX CONTINUING MONTH RANDAL) 1 MG tablet Take 1 tablet (1 mg) by mouth 2 times daily (Patient not taking: Reported on 6/19/2019) 60 tablet 1     varenicline (CHANTIX RANDAL) 0.5 MG X 11 & 1 MG X 42 tablet Take 0.5 mg tab daily for 3 days, THEN 0.5 mg tab twice daily for 4 days, THEN 1 mg twice daily. (Patient not taking: Reported on 6/19/2019) 53 tablet 0        No Known Allergies      Exam: alert ,appears well.  Blood pressure 140/76, pulse 95, temperature 98.7  F (37.1  C),  "temperature source Oral, resp. rate 18, height 1.575 m (5' 2.01\"), weight 51.3 kg (113 lb), SpO2 99 %.  Wt Readings from Last 4 Encounters:   07/09/19 51.3 kg (113 lb)   06/25/19 53.1 kg (117 lb)   06/19/19 52.6 kg (115 lb 14.4 oz)   06/07/19 54.1 kg (119 lb 4.3 oz)     Oropharynx is moist and without lesion. Neck supple and without adenopathy. Lungs:CTA. Heart: RRR, no murmur or rub. Abdomen: soft, nontender, BS active, no masses or organomegaly.  Extremities: warm, no edema. Speech is clear. CN wnl. Gait/station wnl. Skin: no rashes or bruising. Nodes: R axilla node measures about 2 cm, L axilla node is palpable high in the axilla, measures about 1 cm.      Labs: Results for BRENT ALEJANDRE (MRN [id number]) as of 7/9/2019 08:19   Ref. Range 7/8/2019 08:55   Sodium Latest Ref Range: 133 - 144 mmol/L 138   Potassium Latest Ref Range: 3.4 - 5.3 mmol/L 4.4   Chloride Latest Ref Range: 94 - 109 mmol/L 104   Carbon Dioxide Latest Ref Range: 20 - 32 mmol/L 27   Urea Nitrogen Latest Ref Range: 7 - 30 mg/dL 18   Creatinine Latest Ref Range: 0.52 - 1.04 mg/dL 0.46 (L)   GFR Estimate Latest Ref Range: >60 mL/min/1.73_m2 >90   GFR Estimate If Black Latest Ref Range: >60 mL/min/1.73_m2 >90   Calcium Latest Ref Range: 8.5 - 10.1 mg/dL 9.0   Anion Gap Latest Ref Range: 3 - 14 mmol/L 7   Albumin Latest Ref Range: 3.4 - 5.0 g/dL 3.5   Protein Total Latest Ref Range: 6.8 - 8.8 g/dL 8.8   Bilirubin Total Latest Ref Range: 0.2 - 1.3 mg/dL 0.3   Alkaline Phosphatase Latest Ref Range: 40 - 150 U/L 94   ALT Latest Ref Range: 0 - 50 U/L 19   AST Latest Ref Range: 0 - 45 U/L 12   Glucose Latest Ref Range: 70 - 99 mg/dL 100 (H)   WBC Latest Ref Range: 4.0 - 11.0 10e9/L 6.0   Hemoglobin Latest Ref Range: 11.7 - 15.7 g/dL 10.8 (L)   Hematocrit Latest Ref Range: 35.0 - 47.0 % 32.6 (L)   Platelet Count Latest Ref Range: 150 - 450 10e9/L 189   RBC Count Latest Ref Range: 3.8 - 5.2 10e12/L 3.61 (L)   MCV Latest Ref Range: 78 - 100 fl 90   MCH " Latest Ref Range: 26.5 - 33.0 pg 29.9   MCHC Latest Ref Range: 31.5 - 36.5 g/dL 33.1   RDW Latest Ref Range: 10.0 - 15.0 % 14.4   Diff Method Unknown Automated Method   % Neutrophils Latest Units: % 76.3   % Lymphocytes Latest Units: % 16.9   % Monocytes Latest Units: % 6.0   % Eosinophils Latest Units: % 0.2   % Basophils Latest Units: % 0.3   % Immature Granulocytes Latest Units: % 0.3   Nucleated RBCs Latest Ref Range: 0 /100 0   Absolute Neutrophil Latest Ref Range: 1.6 - 8.3 10e9/L 4.6   Absolute Lymphocytes Latest Ref Range: 0.8 - 5.3 10e9/L 1.0   Absolute Monocytes Latest Ref Range: 0.0 - 1.3 10e9/L 0.4   Absolute Eosinophils Latest Ref Range: 0.0 - 0.7 10e9/L 0.0   Absolute Basophils Latest Ref Range: 0.0 - 0.2 10e9/L 0.0   Abs Immature Granulocytes Latest Ref Range: 0 - 0.4 10e9/L 0.0   Absolute Nucleated RBC Unknown 0.0       Imaging: n/a    Impression/plan:   1. 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 No significant side effects thus far.  -will f/u with me on 7/16/19 prior to cycle 2. If doing well, she will plan to travel to [place] to visit her daughter between cycle 2 and 3. Discussed searching for oncology care options when she is there. She lives about 20 minutes from [place], [place], 4 hours from [place]    2. Coping-has had a lot of grief and sometimes anxiety about the future. Was planning to move to [place] when she was diagnosed. She is hoping to be able to move there in the future.  Has been working on positive thinking, taking time to reflect. Discussed support options and she is not feeling those are needed at this time.    3. Tobacco-still smoking about 4 cigarettes/day. Made a quit date and started chantix yesterday. She has been fearful of the side effects of treatment, but so far has tolerated well.    4.  Hypothyroidism, h/o Hashimoto's,  -on levothyroxine 150 mcg. Monitored TFTs every 3 weeks while on immunotherapy

## 2019-07-09 NOTE — LETTER
7/9/2019       RE: Amberly Palafox  [address] [place] [address]     Dear Colleague,    Thank you for referring your patient, Amberly Palafox, to the [facility]. Please see a copy of my visit note below.    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 3.5 x 2.9 cm ART mass  12/11/18            PET/CT. 3.3 x 3.3 cm ART mass (SUV 15.4), lobular component extending anteriorly and medially, L hilar HEATHER, bilateral axillary HEATHER; 1.8 x 1.1 cm R axillary node (SUV 2.1), 1.9 x 0.9 cm potential LN vs soft tissue nodule immediately adjacent to and anterior to R first rib and clavicle (SUV 3.1). 1 cm L periarotic and a few other small rounded retroperitoneal LN  5/29/19              CT chest w/o contrast. Mucoid impactions vs endobronchial tumor leading to ART mass, 4.4 x 3.8 cm ART mass, 0.3 cm ART nodule, 0.4, 0.3, 0.4 cm ART nodules, new since previous, 1.7 x 1.3 cm R axillary LN, 1.9 x 1.4 cm L axillary LN, additional mildly prominent bilateral axillary, subpectoral and supraclav LN, mediastinal adenopathy.   6/7/19                EBUS/bronch (Dr. Us). No endobronchial lesions. Radial EBUS transbronchial bx of ART mass  6/14/19              Brain MRI negative for mets  6/17/19              PET/CT. 5.1 x 4.4 cm ART mass (SUV 20.3), few small satellite nodules new since Dec, stable since 5/29. New LLL subpleural nodule, RLL nodule, bilateral axillary HEATHER; 2.2 x 1.3 cm L axillary node (SUV 3.7), 2.0 x 1.3 cm R axillary LN (SUV 4.1), additional hypermetabolic axillary nodes, 1.3 x 0.8 cm L peribronchial LN (SUV 4.5), L hilar adenopathy, new 0.4 cm hypodensity R liver, stable L periaortic LN.  6/20/19 cycle 1 Carbo/etoposide/atezolizumab    Interval history: Roxana has been feeling quite well. Had a little fatigue after treatment, but feels her energy is improving. No N/V. No mouth  sores. Had low grade fevers without feeling ill after her infusion. Went to the ED. Temp was rechecked and wnl. No recurrence of fevers. Cough is dry, no sob, cp, palpitation. Feels a sensation around the ribs that is not painful. No rash, bruising. No headaches, vision changes. No focal weakness or neuropahty. No mouth sores. Bowel and bladder function is wnl. Has been feeling anxious at times, but feels this is improving. She sits outside in the AM and listens to the birds which she finds relaxing. She is reading a book on the power of positive thinking and is feeling more hopeful. Hopes to travel to [place] after cycle 2, to visit her daughter for about a week.    Current Outpatient Medications   Medication Sig Dispense Refill     albuterol (PROAIR HFA/PROVENTIL HFA/VENTOLIN HFA) 108 (90 Base) MCG/ACT inhaler Inhale 2 puffs into the lungs every 4 hours as needed for shortness of breath / dyspnea 3 Inhaler 3     atorvastatin (LIPITOR) 20 MG tablet   3     B Complex-C (SUPER B COMPLEX PO) Take 1 tablet by mouth daily       cholecalciferol (VITAMIN D3) 5000 units CAPS capsule Take by mouth daily       FISH OIL        levothyroxine (SYNTHROID/LEVOTHROID) 150 MCG tablet Take 1 tablet (150 mcg) by mouth daily 90 tablet 1     Multiple Vitamins-Minerals (MULTIVITAMIN OR) Take  by mouth.       ondansetron (ZOFRAN) 8 MG tablet Take 1 tablet (8 mg) by mouth every 8 hours as needed for nausea 30 tablet 11     prochlorperazine (COMPAZINE) 10 MG tablet Take 1 tablet (10 mg) by mouth every 6 hours as needed (Nausea/Vomiting) 30 tablet 11     tiotropium (SPIRIVA RESPIMAT) 2.5 MCG/ACT inhaler Inhale 2 puffs into the lungs daily Please dispense 3 months supply 12 g 3     varenicline (CHANTIX CONTINUING MONTH RANDAL) 1 MG tablet Take 1 tablet (1 mg) by mouth 2 times daily (Patient not taking: Reported on 6/19/2019) 60 tablet 1     varenicline (CHANTIX RANDAL) 0.5 MG X 11 & 1 MG X 42 tablet Take 0.5 mg tab daily for 3 days, THEN  "0.5 mg tab twice daily for 4 days, THEN 1 mg twice daily. (Patient not taking: Reported on 6/19/2019) 53 tablet 0        No Known Allergies      Exam: alert ,appears well.  Blood pressure 140/76, pulse 95, temperature 98.7  F (37.1  C), temperature source Oral, resp. rate 18, height 1.575 m (5' 2.01\"), weight 51.3 kg (113 lb), SpO2 99 %.  Wt Readings from Last 4 Encounters:   07/09/19 51.3 kg (113 lb)   06/25/19 53.1 kg (117 lb)   06/19/19 52.6 kg (115 lb 14.4 oz)   06/07/19 54.1 kg (119 lb 4.3 oz)     Oropharynx is moist and without lesion. Neck supple and without adenopathy. Lungs:CTA. Heart: RRR, no murmur or rub. Abdomen: soft, nontender, BS active, no masses or organomegaly.  Extremities: warm, no edema. Speech is clear. CN wnl. Gait/station wnl. Skin: no rashes or bruising. Nodes: R axilla node measures about 2 cm, L axilla node is palpable high in the axilla, measures about 1 cm.      Labs: Results for BRENT ALEJANDRE (MRN [id number]) as of 7/9/2019 08:19   Ref. Range 7/8/2019 08:55   Sodium Latest Ref Range: 133 - 144 mmol/L 138   Potassium Latest Ref Range: 3.4 - 5.3 mmol/L 4.4   Chloride Latest Ref Range: 94 - 109 mmol/L 104   Carbon Dioxide Latest Ref Range: 20 - 32 mmol/L 27   Urea Nitrogen Latest Ref Range: 7 - 30 mg/dL 18   Creatinine Latest Ref Range: 0.52 - 1.04 mg/dL 0.46 (L)   GFR Estimate Latest Ref Range: >60 mL/min/1.73_m2 >90   GFR Estimate If Black Latest Ref Range: >60 mL/min/1.73_m2 >90   Calcium Latest Ref Range: 8.5 - 10.1 mg/dL 9.0   Anion Gap Latest Ref Range: 3 - 14 mmol/L 7   Albumin Latest Ref Range: 3.4 - 5.0 g/dL 3.5   Protein Total Latest Ref Range: 6.8 - 8.8 g/dL 8.8   Bilirubin Total Latest Ref Range: 0.2 - 1.3 mg/dL 0.3   Alkaline Phosphatase Latest Ref Range: 40 - 150 U/L 94   ALT Latest Ref Range: 0 - 50 U/L 19   AST Latest Ref Range: 0 - 45 U/L 12   Glucose Latest Ref Range: 70 - 99 mg/dL 100 (H)   WBC Latest Ref Range: 4.0 - 11.0 10e9/L 6.0   Hemoglobin Latest Ref Range: " 11.7 - 15.7 g/dL 10.8 (L)   Hematocrit Latest Ref Range: 35.0 - 47.0 % 32.6 (L)   Platelet Count Latest Ref Range: 150 - 450 10e9/L 189   RBC Count Latest Ref Range: 3.8 - 5.2 10e12/L 3.61 (L)   MCV Latest Ref Range: 78 - 100 fl 90   MCH Latest Ref Range: 26.5 - 33.0 pg 29.9   MCHC Latest Ref Range: 31.5 - 36.5 g/dL 33.1   RDW Latest Ref Range: 10.0 - 15.0 % 14.4   Diff Method Unknown Automated Method   % Neutrophils Latest Units: % 76.3   % Lymphocytes Latest Units: % 16.9   % Monocytes Latest Units: % 6.0   % Eosinophils Latest Units: % 0.2   % Basophils Latest Units: % 0.3   % Immature Granulocytes Latest Units: % 0.3   Nucleated RBCs Latest Ref Range: 0 /100 0   Absolute Neutrophil Latest Ref Range: 1.6 - 8.3 10e9/L 4.6   Absolute Lymphocytes Latest Ref Range: 0.8 - 5.3 10e9/L 1.0   Absolute Monocytes Latest Ref Range: 0.0 - 1.3 10e9/L 0.4   Absolute Eosinophils Latest Ref Range: 0.0 - 0.7 10e9/L 0.0   Absolute Basophils Latest Ref Range: 0.0 - 0.2 10e9/L 0.0   Abs Immature Granulocytes Latest Ref Range: 0 - 0.4 10e9/L 0.0   Absolute Nucleated RBC Unknown 0.0       Imaging: n/a    Impression/plan:   1. 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 No significant side effects thus far.  -will f/u with me on 7/16/19 prior to cycle 2. If doing well, she will plan to travel to [place] to visit her daughter between cycle 2 and 3. Discussed searching for oncology care options when she is there. She lives about 20 minutes from [place], [place], 4 hours from [place]    2. Coping-has had a lot of grief and sometimes anxiety about the future. Was planning to move to [place] when she was diagnosed. She is hoping to be able to move there in the future.  Has been working on positive thinking, taking time to reflect. Discussed support options and she is not  feeling those are needed at this time.    3. Tobacco-still smoking about 4 cigarettes/day. Made a quit date and started chantix yesterday. She has been fearful of the side effects of treatment, but so far has tolerated well.    4. Hypothyroidism, h/o Hashimoto's,  -on levothyroxine 150 mcg. Monitored TFTs every 3 weeks while on immunotherapy    Again, thank you for allowing me to participate in the care of your patient.      Sincerely,    CARLY Bhatt CNP

## 2019-07-09 NOTE — NURSING NOTE
"Oncology Rooming Note    July 9, 2019 8:15 AM   Amberly Palafox is a 74 year old female who presents for:    Chief Complaint   Patient presents with     Oncology Clinic Visit     Return visit related to Small Cell Lung Cancer     Initial Vitals: /76 (BP Location: Left arm, Patient Position: Sitting, Cuff Size: Adult Small)   Pulse 95   Temp 98.7  F (37.1  C) (Oral)   Resp 18   Ht 1.575 m (5' 2.01\")   Wt 51.3 kg (113 lb)   SpO2 99%   BMI 20.66 kg/m   Estimated body mass index is 20.66 kg/m  as calculated from the following:    Height as of this encounter: 1.575 m (5' 2.01\").    Weight as of this encounter: 51.3 kg (113 lb). Body surface area is 1.5 meters squared.  No Pain (0) Comment: Data Unavailable   No LMP recorded. Patient is postmenopausal.  Allergies reviewed: Yes  Medications reviewed: Yes    Medications: Medication refills not needed today.  Pharmacy name entered into [employer]:    [facility] [address] [place] [address] [address] [place] [address]  [facility]  FOR [place], [place] - [address] [facility]    Clinical concerns: No new concerns. Provider was notified.      Reina Gan LPN            "

## 2019-07-16 NOTE — NURSING NOTE
"Oncology Rooming Note    July 16, 2019 10:22 AM   Amberly Palafox is a 74 year old female who presents for:    Chief Complaint   Patient presents with     Oncology Clinic Visit     Return visit related to Small Cell Lung Cancer     Blood Draw     Labs drawn via PIV placed by RN in lab. line flushed with saline. VS taken.      Initial Vitals: /59 (BP Location: Right arm, Patient Position: Sitting, Cuff Size: Adult Regular)   Pulse 93   Temp 98.1  F (36.7  C) (Oral)   Resp 18   Ht 1.575 m (5' 2.01\")   Wt 52.6 kg (116 lb)   SpO2 98%   BMI 21.21 kg/m   Estimated body mass index is 21.21 kg/m  as calculated from the following:    Height as of this encounter: 1.575 m (5' 2.01\").    Weight as of this encounter: 52.6 kg (116 lb). Body surface area is 1.52 meters squared.  No Pain (0) Comment: Data Unavailable   No LMP recorded. Patient is postmenopausal.  Allergies reviewed: Yes  Medications reviewed: Yes    Medications: Medication refills not needed today.  Pharmacy name entered into [employer]:    [facility] [address] [place] [address] [address] [facility]  [facility]  [place], [place] - [address] [place] [address]    Clinical concerns: No new concerns. Provider was notified.      Reina Gan LPN            "

## 2019-07-16 NOTE — PROGRESS NOTES
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 3.5 x 2.9 cm ART mass  12/11/18            PET/CT. 3.3 x 3.3 cm ART mass (SUV 15.4), lobular component extending anteriorly and medially, L hilar HEATHER, bilateral axillary HEATHER; 1.8 x 1.1 cm R axillary node (SUV 2.1), 1.9 x 0.9 cm potential LN vs soft tissue nodule immediately adjacent to and anterior to R first rib and clavicle (SUV 3.1). 1 cm L periarotic and a few other small rounded retroperitoneal LN  5/29/19              CT chest w/o contrast. Mucoid impactions vs endobronchial tumor leading to ART mass, 4.4 x 3.8 cm ART mass, 0.3 cm ART nodule, 0.4, 0.3, 0.4 cm ART nodules, new since previous, 1.7 x 1.3 cm R axillary LN, 1.9 x 1.4 cm L axillary LN, additional mildly prominent bilateral axillary, subpectoral and supraclav LN, mediastinal adenopathy.   6/7/19                EBUS/bronch (Dr. Us). No endobronchial lesions. Radial EBUS transbronchial bx of ART mass  6/14/19              Brain MRI negative for mets  6/17/19              PET/CT. 5.1 x 4.4 cm ART mass (SUV 20.3), few small satellite nodules new since Dec, stable since 5/29. New LLL subpleural nodule, RLL nodule, bilateral axillary HEATHER; 2.2 x 1.3 cm L axillary node (SUV 3.7), 2.0 x 1.3 cm R axillary LN (SUV 4.1), additional hypermetabolic axillary nodes, 1.3 x 0.8 cm L peribronchial LN (SUV 4.5), L hilar adenopathy, new 0.4 cm hypodensity R liver, stable L periaortic LN.  6/20/19 cycle 1 Carbo/etoposide/atezolizumab    Interval history: Roxana is here with her daughter. She is a little anxious, ready to get this next cycle done so she can go to [place] this week. She is feeling well. No fevers/chills. No N/V. No cough/sob, chest pain. No wheezing. No headaches, vision changes. Bowel and bladder function are good. Doing well on chantix, is down to 2 cigarettes/day. Tolerating the chantix well.   "    Current Outpatient Medications   Medication Sig Dispense Refill     albuterol (PROAIR HFA/PROVENTIL HFA/VENTOLIN HFA) 108 (90 Base) MCG/ACT inhaler Inhale 2 puffs into the lungs every 4 hours as needed for shortness of breath / dyspnea 3 Inhaler 3     atorvastatin (LIPITOR) 20 MG tablet   3     B Complex-C (SUPER B COMPLEX PO) Take 1 tablet by mouth daily       cholecalciferol (VITAMIN D3) 5000 units CAPS capsule Take by mouth daily       FISH OIL        levothyroxine (SYNTHROID/LEVOTHROID) 150 MCG tablet Take 1 tablet (150 mcg) by mouth daily 90 tablet 1     Multiple Vitamins-Minerals (MULTIVITAMIN OR) Take  by mouth.       ondansetron (ZOFRAN) 8 MG tablet Take 1 tablet (8 mg) by mouth every 8 hours as needed for nausea (Patient not taking: Reported on 7/9/2019) 30 tablet 11     prochlorperazine (COMPAZINE) 10 MG tablet Take 1 tablet (10 mg) by mouth every 6 hours as needed (Nausea/Vomiting) (Patient not taking: Reported on 7/9/2019) 30 tablet 11     tiotropium (SPIRIVA RESPIMAT) 2.5 MCG/ACT inhaler Inhale 2 puffs into the lungs daily Please dispense 3 months supply 12 g 3     varenicline (CHANTIX CONTINUING MONTH RANDAL) 1 MG tablet Take 1 tablet (1 mg) by mouth 2 times daily 60 tablet 1     varenicline (CHANTIX RANDAL) 0.5 MG X 11 & 1 MG X 42 tablet Take 0.5 mg tab daily for 3 days, THEN 0.5 mg tab twice daily for 4 days, THEN 1 mg twice daily. 53 tablet 0        No Known Allergies      Exam: alert, appears well. Blood pressure 129/59, pulse 93, temperature 98.1  F (36.7  C), temperature source Oral, resp. rate 18, height 1.575 m (5' 2.01\"), weight 52.6 kg (116 lb), SpO2 98 %.  Wt Readings from Last 4 Encounters:   07/16/19 52.6 kg (116 lb)   07/09/19 51.3 kg (113 lb)   06/25/19 53.1 kg (117 lb)   06/19/19 52.6 kg (115 lb 14.4 oz)     Oropharynx is moist and without lesion. Neck supple and without adenopathy. Lungs:CTA. Heart: RRR, no murmur or rub. Abdomen: soft, nontender, BS active, no masses or organomegaly.  " Extremities: warm, no edema. Speech is clear. CN wnl. Gait/station wnl. Skin: no rashes or bruising on exposed skin. Nodes: L axilla node measures about 1 cm, R axilla node measures about 2 cm.      Labs:   Results for BRENT ALEJANDRE (MRN [id number]) as of 7/16/2019 18:10   Ref. Range 7/16/2019 10:13   Sodium Latest Ref Range: 133 - 144 mmol/L 136   Potassium Latest Ref Range: 3.4 - 5.3 mmol/L 3.7   Chloride Latest Ref Range: 94 - 109 mmol/L 101   Carbon Dioxide Latest Ref Range: 20 - 32 mmol/L 28   Urea Nitrogen Latest Ref Range: 7 - 30 mg/dL 12   Creatinine Latest Ref Range: 0.52 - 1.04 mg/dL 0.50 (L)   GFR Estimate Latest Ref Range: >60 mL/min/1.73_m2 >90   GFR Estimate If Black Latest Ref Range: >60 mL/min/1.73_m2 >90   Calcium Latest Ref Range: 8.5 - 10.1 mg/dL 8.6   Anion Gap Latest Ref Range: 3 - 14 mmol/L 6   Albumin Latest Ref Range: 3.4 - 5.0 g/dL 3.4   Protein Total Latest Ref Range: 6.8 - 8.8 g/dL 8.4   Bilirubin Total Latest Ref Range: 0.2 - 1.3 mg/dL 0.3   Alkaline Phosphatase Latest Ref Range: 40 - 150 U/L 83   ALT Latest Ref Range: 0 - 50 U/L 18   AST Latest Ref Range: 0 - 45 U/L 13   TSH Latest Ref Range: 0.40 - 4.00 mU/L 1.44   Glucose Latest Ref Range: 70 - 99 mg/dL 100 (H)   WBC Latest Ref Range: 4.0 - 11.0 10e9/L 6.3   Hemoglobin Latest Ref Range: 11.7 - 15.7 g/dL 11.8   Hematocrit Latest Ref Range: 35.0 - 47.0 % 36.8   Platelet Count Latest Ref Range: 150 - 450 10e9/L 351   RBC Count Latest Ref Range: 3.8 - 5.2 10e12/L 3.92   MCV Latest Ref Range: 78 - 100 fl 94   MCH Latest Ref Range: 26.5 - 33.0 pg 30.1   MCHC Latest Ref Range: 31.5 - 36.5 g/dL 32.1   RDW Latest Ref Range: 10.0 - 15.0 % 16.3 (H)   Diff Method Unknown Automated Method   % Neutrophils Latest Units: % 79.7   % Lymphocytes Latest Units: % 14.1   % Monocytes Latest Units: % 5.1   % Eosinophils Latest Units: % 0.2   % Basophils Latest Units: % 0.3   % Immature Granulocytes Latest Units: % 0.6   Nucleated RBCs Latest Ref Range: 0  /100 0   Absolute Neutrophil Latest Ref Range: 1.6 - 8.3 10e9/L 5.0   Absolute Lymphocytes Latest Ref Range: 0.8 - 5.3 10e9/L 0.9   Absolute Monocytes Latest Ref Range: 0.0 - 1.3 10e9/L 0.3   Absolute Eosinophils Latest Ref Range: 0.0 - 0.7 10e9/L 0.0   Absolute Basophils Latest Ref Range: 0.0 - 0.2 10e9/L 0.0   Abs Immature Granulocytes Latest Ref Range: 0 - 0.4 10e9/L 0.0   Absolute Nucleated RBC Unknown 0.0       Imaging: n/a    Impression/plan:   1. 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 Will proceed with cycle 2 today. She is planning to visit [place] at the end of this week and will return to see Dr. Stubbs with scans prior to cycle 3. She is hoping to be able to transition care there if stable in the future. She plans to identify a few oncologists that could work with her locally while on her trip. She will be staying with her daughter. Reviewed that if she has fevers/chills she may need to be seen locally. She is aware and has identified a hospital.     2. Coping--doing some better since starting treatment. Still adjusting to the idea of having cancer. Is more hopeful about being able to live in [place] as was her plan prior to her cancer diagnosis     3. Tobacco-on Chantix and tolerating well. Cutting back on smoking, currently 2 cigarettes/day. Encouraged her efforts.      4. Hypothyroidism, h/o Hashimoto's,  -on levothyroxine 150 mcg. Monitored TFTs every 3 weeks while on immunotherapy, TSH wnl today.

## 2019-07-16 NOTE — LETTER
7/16/2019       RE: Amberly Palafox  [address] [place] [address]     Dear Colleague,    Thank you for referring your patient, Amberly Palafox, to the [facility]. Please see a copy of my visit note below.    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 3.5 x 2.9 cm ART mass  12/11/18            PET/CT. 3.3 x 3.3 cm ART mass (SUV 15.4), lobular component extending anteriorly and medially, L hilar HEATHER, bilateral axillary HEATHER; 1.8 x 1.1 cm R axillary node (SUV 2.1), 1.9 x 0.9 cm potential LN vs soft tissue nodule immediately adjacent to and anterior to R first rib and clavicle (SUV 3.1). 1 cm L periarotic and a few other small rounded retroperitoneal LN  5/29/19              CT chest w/o contrast. Mucoid impactions vs endobronchial tumor leading to ART mass, 4.4 x 3.8 cm ART mass, 0.3 cm ART nodule, 0.4, 0.3, 0.4 cm ART nodules, new since previous, 1.7 x 1.3 cm R axillary LN, 1.9 x 1.4 cm L axillary LN, additional mildly prominent bilateral axillary, subpectoral and supraclav LN, mediastinal adenopathy.   6/7/19                EBUS/bronch (Dr. Us). No endobronchial lesions. Radial EBUS transbronchial bx of ART mass  6/14/19              Brain MRI negative for mets  6/17/19              PET/CT. 5.1 x 4.4 cm ART mass (SUV 20.3), few small satellite nodules new since Dec, stable since 5/29. New LLL subpleural nodule, RLL nodule, bilateral axillary HEATHER; 2.2 x 1.3 cm L axillary node (SUV 3.7), 2.0 x 1.3 cm R axillary LN (SUV 4.1), additional hypermetabolic axillary nodes, 1.3 x 0.8 cm L peribronchial LN (SUV 4.5), L hilar adenopathy, new 0.4 cm hypodensity R liver, stable L periaortic LN.  6/20/19 cycle 1 Carbo/etoposide/atezolizumab    Interval history: Roxana is here with her daughter. She is a little anxious, ready to get this next cycle done so she can go to [place]  "this week. She is feeling well. No fevers/chills. No N/V. No cough/sob, chest pain. No wheezing. No headaches, vision changes. Bowel and bladder function are good. Doing well on chantix, is down to 2 cigarettes/day. Tolerating the chantix well.      Current Outpatient Medications   Medication Sig Dispense Refill     albuterol (PROAIR HFA/PROVENTIL HFA/VENTOLIN HFA) 108 (90 Base) MCG/ACT inhaler Inhale 2 puffs into the lungs every 4 hours as needed for shortness of breath / dyspnea 3 Inhaler 3     atorvastatin (LIPITOR) 20 MG tablet   3     B Complex-C (SUPER B COMPLEX PO) Take 1 tablet by mouth daily       cholecalciferol (VITAMIN D3) 5000 units CAPS capsule Take by mouth daily       FISH OIL        levothyroxine (SYNTHROID/LEVOTHROID) 150 MCG tablet Take 1 tablet (150 mcg) by mouth daily 90 tablet 1     Multiple Vitamins-Minerals (MULTIVITAMIN OR) Take  by mouth.       ondansetron (ZOFRAN) 8 MG tablet Take 1 tablet (8 mg) by mouth every 8 hours as needed for nausea (Patient not taking: Reported on 7/9/2019) 30 tablet 11     prochlorperazine (COMPAZINE) 10 MG tablet Take 1 tablet (10 mg) by mouth every 6 hours as needed (Nausea/Vomiting) (Patient not taking: Reported on 7/9/2019) 30 tablet 11     tiotropium (SPIRIVA RESPIMAT) 2.5 MCG/ACT inhaler Inhale 2 puffs into the lungs daily Please dispense 3 months supply 12 g 3     varenicline (CHANTIX CONTINUING MONTH RANDAL) 1 MG tablet Take 1 tablet (1 mg) by mouth 2 times daily 60 tablet 1     varenicline (CHANTIX RANDAL) 0.5 MG X 11 & 1 MG X 42 tablet Take 0.5 mg tab daily for 3 days, THEN 0.5 mg tab twice daily for 4 days, THEN 1 mg twice daily. 53 tablet 0        No Known Allergies      Exam: alert, appears well. Blood pressure 129/59, pulse 93, temperature 98.1  F (36.7  C), temperature source Oral, resp. rate 18, height 1.575 m (5' 2.01\"), weight 52.6 kg (116 lb), SpO2 98 %.  Wt Readings from Last 4 Encounters:   07/16/19 52.6 kg (116 lb)   07/09/19 51.3 kg (113 lb) "   06/25/19 53.1 kg (117 lb)   06/19/19 52.6 kg (115 lb 14.4 oz)     Oropharynx is moist and without lesion. Neck supple and without adenopathy. Lungs:CTA. Heart: RRR, no murmur or rub. Abdomen: soft, nontender, BS active, no masses or organomegaly.  Extremities: warm, no edema. Speech is clear. CN wnl. Gait/station wnl. Skin: no rashes or bruising on exposed skin. Nodes: L axilla node measures about 1 cm, R axilla node measures about 2 cm.      Labs:   Results for BRENT ALEJANDRE (MRN [id number]) as of 7/16/2019 18:10   Ref. Range 7/16/2019 10:13   Sodium Latest Ref Range: 133 - 144 mmol/L 136   Potassium Latest Ref Range: 3.4 - 5.3 mmol/L 3.7   Chloride Latest Ref Range: 94 - 109 mmol/L 101   Carbon Dioxide Latest Ref Range: 20 - 32 mmol/L 28   Urea Nitrogen Latest Ref Range: 7 - 30 mg/dL 12   Creatinine Latest Ref Range: 0.52 - 1.04 mg/dL 0.50 (L)   GFR Estimate Latest Ref Range: >60 mL/min/1.73_m2 >90   GFR Estimate If Black Latest Ref Range: >60 mL/min/1.73_m2 >90   Calcium Latest Ref Range: 8.5 - 10.1 mg/dL 8.6   Anion Gap Latest Ref Range: 3 - 14 mmol/L 6   Albumin Latest Ref Range: 3.4 - 5.0 g/dL 3.4   Protein Total Latest Ref Range: 6.8 - 8.8 g/dL 8.4   Bilirubin Total Latest Ref Range: 0.2 - 1.3 mg/dL 0.3   Alkaline Phosphatase Latest Ref Range: 40 - 150 U/L 83   ALT Latest Ref Range: 0 - 50 U/L 18   AST Latest Ref Range: 0 - 45 U/L 13   TSH Latest Ref Range: 0.40 - 4.00 mU/L 1.44   Glucose Latest Ref Range: 70 - 99 mg/dL 100 (H)   WBC Latest Ref Range: 4.0 - 11.0 10e9/L 6.3   Hemoglobin Latest Ref Range: 11.7 - 15.7 g/dL 11.8   Hematocrit Latest Ref Range: 35.0 - 47.0 % 36.8   Platelet Count Latest Ref Range: 150 - 450 10e9/L 351   RBC Count Latest Ref Range: 3.8 - 5.2 10e12/L 3.92   MCV Latest Ref Range: 78 - 100 fl 94   MCH Latest Ref Range: 26.5 - 33.0 pg 30.1   MCHC Latest Ref Range: 31.5 - 36.5 g/dL 32.1   RDW Latest Ref Range: 10.0 - 15.0 % 16.3 (H)   Diff Method Unknown Automated Method   %  Neutrophils Latest Units: % 79.7   % Lymphocytes Latest Units: % 14.1   % Monocytes Latest Units: % 5.1   % Eosinophils Latest Units: % 0.2   % Basophils Latest Units: % 0.3   % Immature Granulocytes Latest Units: % 0.6   Nucleated RBCs Latest Ref Range: 0 /100 0   Absolute Neutrophil Latest Ref Range: 1.6 - 8.3 10e9/L 5.0   Absolute Lymphocytes Latest Ref Range: 0.8 - 5.3 10e9/L 0.9   Absolute Monocytes Latest Ref Range: 0.0 - 1.3 10e9/L 0.3   Absolute Eosinophils Latest Ref Range: 0.0 - 0.7 10e9/L 0.0   Absolute Basophils Latest Ref Range: 0.0 - 0.2 10e9/L 0.0   Abs Immature Granulocytes Latest Ref Range: 0 - 0.4 10e9/L 0.0   Absolute Nucleated RBC Unknown 0.0       Imaging: n/a    Impression/plan:   1. 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 Will proceed with cycle 2 today. She is planning to visit [place] at the end of this week and will return to see Dr. Stubbs with scans prior to cycle 3. She is hoping to be able to transition care there if stable in the future. She plans to identify a few oncologists that could work with her locally while on her trip. She will be staying with her daughter. Reviewed that if she has fevers/chills she may need to be seen locally. She is aware and has identified a hospital.     2. Coping--doing some better since starting treatment. Still adjusting to the idea of having cancer. Is more hopeful about being able to live in [place] as was her plan prior to her cancer diagnosis     3. Tobacco-on Chantix and tolerating well. Cutting back on smoking, currently 2 cigarettes/day. Encouraged her efforts.      4. Hypothyroidism, h/o Hashimoto's,  -on levothyroxine 150 mcg. Monitored TFTs every 3 weeks while on immunotherapy, TSH wnl today.      Again, thank you for allowing me to  participate in the care of your patient.      Sincerely,    CARLY Bhatt CNP

## 2019-07-16 NOTE — NURSING NOTE
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 line flushed with saline. VS taken.      Jim Mullen RN

## 2019-07-16 NOTE — PROGRESS NOTES
Infusion Nursing Note:  Amberly Palafox presents today for cycle 2, day 1 tecentriq, carboplatin, etoposide  Patient seen by provider today: Yes: Norma Crespo NP   present during visit today: Not Applicable.    Note: N/A.    Intravenous Access:  Peripheral IV placed in lab, Flushed with ease and had brisk blood return on arrival to infusion. PIV was painful to flush and had no blood return at the completion of emend.  Site without redness or swelling.  PIV dc'd.    New PIV placed by vascular access with ultrasound and used for the remainder of infusion     Treatment Conditions:  Lab Results   Component Value Date    HGB 11.8 07/16/2019     Lab Results   Component Value Date    WBC 6.3 07/16/2019      Lab Results   Component Value Date    ANEU 5.0 07/16/2019     Lab Results   Component Value Date     07/16/2019      Lab Results   Component Value Date     07/16/2019                   Lab Results   Component Value Date    POTASSIUM 3.7 07/16/2019           Lab Results   Component Value Date    MAG 2.2 05/17/2019            Lab Results   Component Value Date    CR 0.50 07/16/2019                   Lab Results   Component Value Date    JASPAL 8.6 07/16/2019                Lab Results   Component Value Date    BILITOTAL 0.3 07/16/2019           Lab Results   Component Value Date    ALBUMIN 3.4 07/16/2019                    Lab Results   Component Value Date    ALT 18 07/16/2019           Lab Results   Component Value Date    AST 13 07/16/2019       Results reviewed, labs MET treatment parameters, ok to proceed with treatment.      Post Infusion Assessment:  Patient tolerated infusion without incident.  Blood return noted pre and post infusion.  Site patent and intact, free from redness, edema or discomfort.  No evidence of extravasations.  Access discontinued per protocol and per patient preference      Discharge Plan:   Patient declined prescription refills.  Discharge instructions reviewed with:  Patient.  Patient and/or family verbalized understanding of discharge instructions and all questions answered.  AVS to patient via AgendaT.  Patient will return tomorrow for next appointment.   Patient discharged in stable condition accompanied by: self.  Departure Mode: Ambulatory.  Face to Face time: 0.    Daysi Starkey RN

## 2019-07-17 NOTE — PROGRESS NOTES
Infusion Nursing Note:  Amberly Palafox presents for C2D2 Etoposide    Note: pt feeling well today, no new issue or concerns to report.    Pain: denies    Treatment Conditions:  Reviewed labs from yesterday.    Intravenous Access:  Peripheral IV placed by vascular access.    Post Infusion Assessment:  Patient tolerated infusion without incident.  Blood return noted pre and post infusion.  No evidence of extravasations.  PIV left intact for infusion tomorrow.    Discharge Plan:   Patient declined prescription refills.  Discharge instructions reviewed with: Patient.  Patient and/or family verbalized understanding of discharge instructions and all questions answered.  AVS to patient via [employer]ART.  Patient will return tomorrow for next appointment.   Patient discharged in stable condition accompanied by: self.    Shari Cheek RN

## 2019-07-17 NOTE — PATIENT INSTRUCTIONS
Contact numbers:  Triage Main/After hours Line: [phone]    Main (scheduling) line: [phone]    Call with chills and/or temperature greater than or equal to 100.5 and questions or concerns.    If after hours, weekends, or holidays, call main hospital  at  [phone] and ask for Oncology doctor on call.         July 2019 Sunday Monday Tuesday Wednesday Thursday Friday Saturday        1     2     3     4     5     6       7     8    Outpatient Visit   8:08 AM   Norma Crespo APRN CNP   [facility]    RETURN   8:50 AM   (20 min.)    LAB DRAW 1   [facility] 9    [facility] RETURN   8:15 AM   (50 min.)   Norma Crespo APRN CNP   [facility] 10     11     12     13       14     15     16    [facility] DRAW   9:45 AM   (15 min.)   [facility]   [facility] Draw    [facility] RETURN   9:55 AM   (50 min.)   Norma Crespo APRN CNP   [facility]    [facility] ONC INFUSION 180  12:00 PM   (180 min.)   [facility] ONCOLOGY INFUSION   [facility] 17    [facility] ONC INFUSION 120  12:30 PM   (120 min.)   [facility] INFUSION   [facility] 18    [facility] ONC INFUSION 120  10:30 AM   (120 min.)   [facility] INFUSION   [facility] 19     20       21     22     23     24     25     26     27       28     29     30     31                                August 2019 Sunday Monday Tuesday Wednesday Thursday Friday Saturday                       1     2    CT CHEST/ABDOMEN/PELVIS W  10:30 AM   (20 min.)   UC[facility]   [facility] CT 3       4     5     6    [facility] DRAW  10:15 AM   (15 min.)   [facility] DRAW   [facility] Lab Draw    [facility] RETURN  10:30 AM   (30 min.)   Candy Stubbs MD   [facility]    [facility] ONC INFUSION 180  11:30 AM   (180 min.)   [facility] ONCOL[facility]Y INFUSION   [facility]    [facility] RETURN   2:15 PM   (30 min.)    Es Palma MD   [facility] 7    [facility] ONC INFUSION 120  10:30 AM   (120 min.)    ONCOLOGY INFUSION   [facility] 8    [facility] ONC INFUSION 120  10:30 AM   (120 min.)    ONCOLOGY INFUSION   [facility] 9     10       11     12     13     14     15     16     17       18     19     20     21     22     23     24       25     26     27     28     29     30     31                     Lab Results:  No results found for this or any previous visit (from the past 12 hour(s)).

## 2019-07-17 NOTE — PROGRESS NOTES
"SPIRITUAL HEALTH SERVICES  SPIRITUAL ASSESSMENT Progress Note  [facility]     REASON FOR ENCOUNTER: Follow-up      Reviewed documentation and had supportive follow-up visit with Roxana which included a reflective conversation incorporating elements of illness and family narratives.    Roxana is excited to go to [place] this week to visit her daughter and to see her dog, who has been temporarily cared for by Roxana's daughter (\"It was just him and me for 10 years; this has been hard on him!\")    She reflected on her experience of treatment, noting that it's been \"so much better than I thought it was going to be.\" She said that other than fatigue, she has experienced minimal side effects and has been grateful not to lose her hair.    She has been reading a book Mind over Medicine that has resonated with her. In giving an overview of her treatment plan, she cited the number of chemotherapy treatments she has remaining, noting that \"then [my doctor] will do a CT scan, and it will all be gone.\"    Roxana feels well cared for and supported, and articulated her gratitude for how well things have gone, far exceeding her expectations after the \"shock\" of her diagnosis (\"I never thought this would happen to me\").     Prashanth Yen MDiv  Chaplain Resident  Pager [phone]  Cell [phone]    "

## 2019-07-18 NOTE — PROGRESS NOTES
Infusion Nursing Note:  Amberly Palafox presents today for Cycle 2 Day 3 Etoposide.    Patient seen and examined by Norma Crespo NP in clinic on 7/16/19.     Note:   Neulasta Onpro On-Body injector applied to LLQ abdomen at 1215 with light facing upwards.  Writer discussed Neulasta injection would start tomorrow 7/19/19 at 1515, approximately 27 hours after application applied today.  Written and Verbal instruction reviewed with patient.  Pt instructed when the dose delivery starts, it will take about 45 minutes to complete.  Pt aware Neulasta Onpro On-Body should have green flashing light and to call triage or on-call MD if injector flashes red or appears to be leaking. Pt aware to keep Onpro On-Body Neulasta 4 inches away from electrical equipment and to avoid showering 4 hours prior to injection.   Neulasta Onpro Lot number: [id number]      Intravenous Access:  Peripheral IV intact from yesterday  Blood return noted pre and post infusion.         Treatment Conditions:  Lab Results   Component Value Date    HGB 11.8 07/16/2019     Lab Results   Component Value Date    WBC 6.3 07/16/2019      Lab Results   Component Value Date    ANEU 5.0 07/16/2019     Lab Results   Component Value Date     07/16/2019      Lab Results   Component Value Date     07/16/2019                   Lab Results   Component Value Date    POTASSIUM 3.7 07/16/2019           Lab Results   Component Value Date    MAG 2.2 05/17/2019            Lab Results   Component Value Date    CR 0.50 07/16/2019                   Lab Results   Component Value Date    JASPAL 8.6 07/16/2019                Lab Results   Component Value Date    BILITOTAL 0.3 07/16/2019           Lab Results   Component Value Date    ALBUMIN 3.4 07/16/2019                    Lab Results   Component Value Date    ALT 18 07/16/2019           Lab Results   Component Value Date    AST 13 07/16/2019       Results reviewed, labs MET treatment parameters, ok to proceed with  treatment.      Post Infusion Assessment:  Patient tolerated infusion without incident.      Discharge Plan:   Patient declined prescription refills.    AVS to patient via [employer].  Patient will return 8/6/19 for cycle 3.  Face to Face time: 0.    Amelie Whitfield RN

## 2019-07-18 NOTE — PATIENT INSTRUCTIONS
Contact Numbers    [facility] Main Line: [phone]  [facility] Triage and after hours / weekends / holidays:  [phone]      Please call the triage or after hours line if you experience a temperature greater than or equal to 100.5, shaking chills, have uncontrolled nausea, vomiting and/or diarrhea, dizziness, shortness of breath, chest pain, bleeding, unexplained bruising, or if you have any other new/concerning symptoms, questions or concerns.      If you are having any concerning symptoms or wish to speak to a provider before your next infusion visit, please call your care coordinator or triage to notify them so we can adequately serve you.     If you need a refill on a narcotic prescription or other medication, please call before your infusion appointment.

## 2019-08-01 NOTE — TELEPHONE ENCOUNTER
Tobacco Treatment Program at the [facility] attempted to reach Ms. Palafox on 8/1/2019 regarding the tobacco cessation program to help Ms. Palafox to quit smoking. We will attempt to reach Ms. Palafox another time.     [facility] Mansfield [facility]  Tobacco Treatment Specialist  PH: [phone]

## 2019-08-02 NOTE — DISCHARGE INSTRUCTIONS

## 2019-08-06 NOTE — NURSING NOTE
"Oncology Rooming Note    August 6, 2019 11:06 AM   Amberly Palafox is a 74 year old female who presents for:    Chief Complaint   Patient presents with     Blood Draw     Labs drawn via PIV placed by vascular access. VS taken. Patient checked in for next appt.     Oncology Clinic Visit     Return Lung Ca     Initial Vitals: BP (!) 147/70 (BP Location: Left arm, Patient Position: Sitting, Cuff Size: Adult Small)   Pulse 98   Temp 98.3  F (36.8  C) (Oral)   Resp 16   Ht 1.575 m (5' 2\")   Wt 53.9 kg (118 lb 12.8 oz)   SpO2 97%   Breastfeeding? No   BMI 21.73 kg/m   Estimated body mass index is 21.73 kg/m  as calculated from the following:    Height as of this encounter: 1.575 m (5' 2\").    Weight as of this encounter: 53.9 kg (118 lb 12.8 oz). Body surface area is 1.54 meters squared.  No Pain (0) Comment: Data Unavailable   No LMP recorded. Patient is postmenopausal.  Allergies reviewed: Yes  Medications reviewed: Yes    Medications: Medication refills not needed today.  Pharmacy name entered into [employer]: [id number] - [id number] [place] - [id number]    Clinical concerns: CT scan results    Estefani Alex CMA              "

## 2019-08-06 NOTE — LETTER
8/6/2019       RE: Amberly Palafox  [address] [place] [address]     Dear Colleague,    Thank you for referring your patient, Amberly Palafox, to the [facility]. Please see a copy of my visit note below.    [facility]  [facility]    FOLLOW-UP VISIT NOTE    PATIENT NAME: Amberly Palafox MRN # [id number]  DATE OF VISIT: August 6, 2019 YOB: 1945    CANCER TYPE: High grade neuroendocrine carcinoma  STAGE: Extensive  ECOG PS: 0    Cancer Staging  Small cell lung cancer (H)  Staging form: Lung, AJCC 8th Edition  - Clinical stage from 6/19/2019: Stage IV (cT2a, cN3, pM1c) - Signed by Candy Stubbs MD on 6/19/2019    PD-L1:  NGS:    SUMMARY  9/25/18 LDCT for screening through PCP. 3.5 x 2.9 cm ART mass  12/11/18 PET/CT. 3.3 x 3.3 cm ART mass (SUV 15.4), lobular component extending anteriorly and medially, L hilar HEATHER, bilateral axillary HEATHER; 1.8 x 1.1 cm R axillary node (SUV 2.1), 1.9 x 0.9 cm potential LN vs soft tissue nodule immediately adjacent to and anterior to R first rib and clavicle (SUV 3.1). 1 cm L periarotic and a few other small rounded retroperitoneal LN  5/29/19 CT chest w/o contrast. Mucoid impactions vs endobronchial tumor leading to ART mass, 4.4 x 3.8 cm ART mass, 0.3 cm ART nodule, 0.4, 0.3, 0.4 cm ART nodules, new since previous, 1.7 x 1.3 cm R axillary LN, 1.9 x 1.4 cm L axillary LN, additional mildly prominent bilateral axillary, subpectoral and supraclav LN, mediastinal adenopathy.   6/7/19 EBUS/bronch (Dr. Us). No endobronchial lesions. Radial EBUS transbronchial bx of ART mass  6/14/19 Brain MRI negative for mets  6/17/19 PET/CT. 5.1 x 4.4 cm ART mass (SUV 20.3), few small satellite nodules new since Dec, stable since 5/29. New LLL subpleural nodule, RLL nodule, bilateral axillary HEATHER; 2.2 x 1.3 cm L axillary node (SUV 3.7), 2.0 x 1.3 cm R axillary LN (SUV 4.1), additional hypermetabolic axillary nodes,  1.3 x 0.8 cm L peribronchial LN (SUV 4.5), L hilar adenopathy, new 0.4 cm hypodensity R liver, stable L periaortic LN.    DENZEL Schmidt returns today for follow up of SCLC after 2 cycles of carbo etoposide atezo. Doing well. No significant issues. Energy and apeptite are good. Hasn't lost her hair. No increased shorntess of breath, chest pain/pressure, HA, N/V, constipation, diarrhea, numbness/tingling, fevers, chills, bleeding.       PAST MEDICAL HISTORY  SCLC as above  COPD. PFT 2013 FEV1 1.08 (48%), FVC 2.02 (69%), DLCOcor 12.9 (63%)  H/o Hashimoto's thyroiditis, now hypothyroidism  Osteopenia  Dyslipidemia  Vitamin D deficiency    CURRENT OUTPATIENT MEDICATIONS  Current Outpatient Medications   Medication Sig Dispense Refill     albuterol (PROAIR HFA/PROVENTIL HFA/VENTOLIN HFA) 108 (90 Base) MCG/ACT inhaler Inhale 2 puffs into the lungs every 4 hours as needed for shortness of breath / dyspnea 3 Inhaler 3     B Complex-C (SUPER B COMPLEX PO) Take 1 tablet by mouth daily       cholecalciferol (VITAMIN D3) 5000 units CAPS capsule Take by mouth daily       FISH OIL        levothyroxine (SYNTHROID/LEVOTHROID) 150 MCG tablet Take 1 tablet (150 mcg) by mouth daily 90 tablet 1     Multiple Vitamins-Minerals (MULTIVITAMIN OR) Take  by mouth.       tiotropium (SPIRIVA RESPIMAT) 2.5 MCG/ACT inhaler Inhale 2 puffs into the lungs daily Please dispense 3 months supply 12 g 3     varenicline (CHANTIX CONTINUING MONTH RANDAL) 1 MG tablet Take 1 tablet (1 mg) by mouth 2 times daily 60 tablet 1     atorvastatin (LIPITOR) 20 MG tablet   3     ondansetron (ZOFRAN) 8 MG tablet Take 1 tablet (8 mg) by mouth every 8 hours as needed for nausea (Patient not taking: Reported on 8/6/2019) 30 tablet 11     prochlorperazine (COMPAZINE) 10 MG tablet Take 1 tablet (10 mg) by mouth every 6 hours as needed (Nausea/Vomiting) (Patient not taking: Reported on 8/6/2019) 30 tablet 11     ALLERGIES  No Known Allergies    REVIEW OF SYSTEMS  As  "above in the HPI, o/w complete 12-point ROS was negative.    PHYSICAL EXAM  BP (!) 147/70 (BP Location: Left arm, Patient Position: Sitting, Cuff Size: Adult Small)   Pulse 98   Temp 98.3  F (36.8  C) (Oral)   Resp 16   Ht 1.575 m (5' 2\")   Wt 53.9 kg (118 lb 12.8 oz)   SpO2 97%   Breastfeeding? No   BMI 21.73 kg/m     Wt Readings from Last 3 Encounters:   08/06/19 53.9 kg (118 lb 12.8 oz)   07/17/19 54.5 kg (120 lb 3.2 oz)   07/16/19 52.6 kg (116 lb)     GEN: NAD  HEENT: EOMI, no icterus, injection or pallor. Oropharynx clear.  LUNGS: clear bilaterally  CV: regular, no murmurs, rubs, or gallops  ABDOMEN: soft, non-tender, non-distended, normal bowel sounds  EXT: warm, well perfused, no edema  NEURO: alert    LABORATORY AND IMAGING STUDIES  Results for BRENT ALEJANDRE (MRN [id number]) as of 8/7/2019 13:35   8/6/2019 10:50   Sodium 137   Potassium 3.8   Chloride 102   Carbon Dioxide 26   Urea Nitrogen 14   Creatinine 0.51 (L)   GFR Estimate >90   GFR Estimate If Black >90   Calcium 9.0   Anion Gap 7   Albumin 3.7   Protein Total 8.3   Bilirubin Total 0.4   Alkaline Phosphatase 90   ALT 22   AST 18   T4 Free 1.24   TSH 2.06   Glucose 94   WBC 8.8   Hemoglobin 10.1 (L)   Hematocrit 31.2 (L)   Platelet Count 277   RBC Count 3.27 (L)   MCV 95   MCH 30.9   MCHC 32.4   RDW 18.5 (H)   Diff Method Automated Method   % Neutrophils 82.9   % Lymphocytes 10.2   % Monocytes 5.6   % Eosinophils 0.1   % Basophils 0.3   % Immature Granulocytes 0.9   Nucleated RBCs 0   Absolute Neutrophil 7.3   Absolute Lymphocytes 0.9   Absolute Monocytes 0.5   Absolute Eosinophils 0.0   Absolute Basophils 0.0   Abs Immature Granulocytes 0.1   Absolute Nucleated RBC 0.0     Results for orders placed or performed in visit on 08/02/19   CT Chest/Abdomen/Pelvis w Contrast    Narrative    EXAMINATION: CT of the Chest, Abdomen and Pelvis on 8/2/2019.    INDICATION: SCLC restaging after 2 cycles of carboplatin and etoposide      COMPARISON: PET " CT 6/7/2019 and CT chest 5/29/2019     TECHNIQUE: Axial images of the chest, abdomen and pelvis were obtained  with contrast. Coronal reconstructions were provided. Images were  reviewed in bone, lung, and soft tissue windows.    Dose: 337 mGy*cm    Contrast: Isovue 370 73ml    FINDINGS:    Chest: Thyroid is unremarkable large bilateral axillary lymph nodes  with the largest on the left measuring 1.4x 2.9 x 1.6 cm (previously  2.2 x 1.3 cm). Enlarged right axillary lymph node measuring 1.2 x 1.7  cm (previously 2.0 x 1.3). No appreciable supraclavicular  lymphadenopathy. Enlarged mediastinal lymph node measuring 1.2 x 1.0  series 3 image 129, with slightly necrotic appearing center. Prominent  left hilar lymph node. Heart is grossly normal in size. No significant  coronary calcifications. Aorta and pulmonary artery are normal in  caliber. Moderate aortic arch calcifications.    Once again in the left upper lobe there is a large mass measuring 3.0  x 3.0 cm, previously measured 5.1 x 4.4 cm. There is no significant  decrease in size of previously demonstrated Satellite nodules with the  nodule in the peripheral left upper lobe measuring 4 x 3 mm (series 6  image 39). Left lower lobe pleural-based nodule measuring 3 x 3 mm is  slightly smaller than previous imaging. Several sub-4 mm nonsuspicious  appearing pulmonary nodules. Centrilobular emphysema again noted with  mild intralobular septal thickening in the upper lobes. Unchanged  intrafissural lymph node in the right major fissure.      Abdomen and Pelvis:  1.1 x 0.7 cm hypodensity in the liver dome is grossly unchanged.  Remaining liver parenchyma is unremarkable. Normal contour of the  liver. The gallbladder is decompressed without evidence of  cholelithiasis. No pericholecystic fluid. Common bile duct is not  dilated. The spleen, adrenal glands and pancreas are unremarkable.    Bilateral kidneys are grossly unremarkable. No hydronephrosis, renal  mass or  hydroureter. No visualized renal calculi. Bladder is  decompressed and unremarkable. Multiple pelvic phleboliths.    No pelvic mass. The largest small bowel are normal in caliber and  appearance. The appendix is not definitively visualized however there  is no increased vascularity or edema in the right lower quadrant to  suggest acute appendicitis. The stomach is unremarkable.    Moderate distal abdominal aortic and biiliac calcifications with  moderate stenosis of the left common femoral artery. No evidence for  aneurysm. The major branches off the abdominal aorta are patent.    No significant intra-abdominal lymphadenopathy. Soft tissues are  unremarkable. No acute osseous lesions. No acute fractures are  dislocations. Mild degenerative changes of the thoracolumbar spine.        Impression    IMPRESSION: In this patient with known SCLC on immunotherapy there is  positive response to therapy without evidence of autoimmune side  effects.  1. Marked decrease in size of the left large left upper lobe lung  lesion now measuring 3 x 3 cm and decrease in size of satellite  disease.   2. Similar size bilateral axillary lymphadenopathy. Slightly improved  mediastinal left hilar lymphadenopathy.         I have personally reviewed the examination and initial interpretation  and I agree with the findings.    ISIAH DOMINIQUE MD     Imaging was personally reviewed     ASSESSMENT AND PLAN  SCLC: Good [place] to 2 cycles of carbo etoposide atezolizumab. Recommended 2 more cycles and restage with CT CAP. If SD or better, will be followed by maintenance atezo until AE, progression, decision to stop.     Tobacco dependence: Working hard to quit. Down to a few cigarettes per day. Stopped chantix because it was making her feel quite bad. She's open to working with Daysi Mansfield. I'll send her a message - Daysi has tried to call before but luz marina hasn't called back.    Anxiety: Doing much better.     Mild hyperthyroidism: Has H/o  Hashimoto's and is on levothyroxine. Will recheck over the next several weeks and adjust levothyroxine dose if remains elevated.    COPD: Breathing ok.     Social: She really hopes to spend her[place] and maybe more in [facility], where her other daughter lives. We settled on [facility]. I gave her the name of two thoracic oncologists there in the [place] system. Asked her to call and start the process of making an appt. She might go there in Sept.    A total of 30 minutes was spent with the patient, >50% of which was spent in counseling and coordination of care.    Candy Stubbs MD    Hematology, Oncology and Transplantation

## 2019-08-06 NOTE — PATIENT INSTRUCTIONS
Contact Numbers    [facility] Main Line/Triage and after hours / weekends / holidays: [phone]      Please call the triage or after hours line if you experience a temperature greater than or equal to 100.5, shaking chills, have uncontrolled nausea, vomiting and/or diarrhea, dizziness, shortness of breath, chest pain, bleeding, unexplained bruising, or if you have any other new/concerning symptoms, questions or concerns.      If you are having any concerning symptoms or wish to speak to a provider before your next infusion visit, please call your care coordinator or triage to notify them so we can adequately serve you.     If you need a refill on a narcotic prescription or other medication, please call before your infusion appointment.

## 2019-08-06 NOTE — PROGRESS NOTES
[facility]    FOLLOW-UP VISIT NOTE    PATIENT NAME: Amberly Palafox MRN # [id number]  DATE OF VISIT: August 6, 2019 YOB: 1945    CANCER TYPE: High grade neuroendocrine carcinoma  STAGE: Extensive  ECOG PS: 0    Cancer Staging  Small cell lung cancer (H)  Staging form: Lung, AJCC 8th Edition  - Clinical stage from 6/19/2019: Stage IV (cT2a, cN3, pM1c) - Signed by Candy Stubbs MD on 6/19/2019    PD-L1:  NGS:    SUMMARY  9/25/18 LDCT for screening through PCP. 3.5 x 2.9 cm ART mass  12/11/18 PET/CT. 3.3 x 3.3 cm ART mass (SUV 15.4), lobular component extending anteriorly and medially, L hilar HEATHER, bilateral axillary HEATHER; 1.8 x 1.1 cm R axillary node (SUV 2.1), 1.9 x 0.9 cm potential LN vs soft tissue nodule immediately adjacent to and anterior to R first rib and clavicle (SUV 3.1). 1 cm L periarotic and a few other small rounded retroperitoneal LN  5/29/19 CT chest w/o contrast. Mucoid impactions vs endobronchial tumor leading to ART mass, 4.4 x 3.8 cm ART mass, 0.3 cm ART nodule, 0.4, 0.3, 0.4 cm ART nodules, new since previous, 1.7 x 1.3 cm R axillary LN, 1.9 x 1.4 cm L axillary LN, additional mildly prominent bilateral axillary, subpectoral and supraclav LN, mediastinal adenopathy.   6/7/19 EBUS/bronch (Dr. Us). No endobronchial lesions. Radial EBUS transbronchial bx of ART mass  6/14/19 Brain MRI negative for mets  6/17/19 PET/CT. 5.1 x 4.4 cm ART mass (SUV 20.3), few small satellite nodules new since Dec, stable since 5/29. New LLL subpleural nodule, RLL nodule, bilateral axillary HEATHER; 2.2 x 1.3 cm L axillary node (SUV 3.7), 2.0 x 1.3 cm R axillary LN (SUV 4.1), additional hypermetabolic axillary nodes, 1.3 x 0.8 cm L peribronchial LN (SUV 4.5), L hilar adenopathy, new 0.4 cm hypodensity R liver, stable L periaortic LN.    SUBJECTIVE  Roxana returns today for follow up of SCLC after 2 cycles of carbo etoposide atezo. Doing well. No significant  "issues. Energy and apeptite are good. Hasn't lost her hair. No increased shorntess of breath, chest pain/pressure, HA, N/V, constipation, diarrhea, numbness/tingling, fevers, chills, bleeding.       PAST MEDICAL HISTORY  SCLC as above  COPD. PFT 2013 FEV1 1.08 (48%), FVC 2.02 (69%), DLCOcor 12.9 (63%)  H/o Hashimoto's thyroiditis, now hypothyroidism  Osteopenia  Dyslipidemia  Vitamin D deficiency    CURRENT OUTPATIENT MEDICATIONS  Current Outpatient Medications   Medication Sig Dispense Refill     albuterol (PROAIR HFA/PROVENTIL HFA/VENTOLIN HFA) 108 (90 Base) MCG/ACT inhaler Inhale 2 puffs into the lungs every 4 hours as needed for shortness of breath / dyspnea 3 Inhaler 3     B Complex-C (SUPER B COMPLEX PO) Take 1 tablet by mouth daily       cholecalciferol (VITAMIN D3) 5000 units CAPS capsule Take by mouth daily       FISH OIL        levothyroxine (SYNTHROID/LEVOTHROID) 150 MCG tablet Take 1 tablet (150 mcg) by mouth daily 90 tablet 1     Multiple Vitamins-Minerals (MULTIVITAMIN OR) Take  by mouth.       tiotropium (SPIRIVA RESPIMAT) 2.5 MCG/ACT inhaler Inhale 2 puffs into the lungs daily Please dispense 3 months supply 12 g 3     varenicline (CHANTIX CONTINUING MONTH RANDAL) 1 MG tablet Take 1 tablet (1 mg) by mouth 2 times daily 60 tablet 1     atorvastatin (LIPITOR) 20 MG tablet   3     ondansetron (ZOFRAN) 8 MG tablet Take 1 tablet (8 mg) by mouth every 8 hours as needed for nausea (Patient not taking: Reported on 8/6/2019) 30 tablet 11     prochlorperazine (COMPAZINE) 10 MG tablet Take 1 tablet (10 mg) by mouth every 6 hours as needed (Nausea/Vomiting) (Patient not taking: Reported on 8/6/2019) 30 tablet 11     ALLERGIES  No Known Allergies    REVIEW OF SYSTEMS  As above in the HPI, o/w complete 12-point ROS was negative.    PHYSICAL EXAM  BP (!) 147/70 (BP Location: Left arm, Patient Position: Sitting, Cuff Size: Adult Small)   Pulse 98   Temp 98.3  F (36.8  C) (Oral)   Resp 16   Ht 1.575 m (5' 2\")   Wt " 53.9 kg (118 lb 12.8 oz)   SpO2 97%   Breastfeeding? No   BMI 21.73 kg/m    Wt Readings from Last 3 Encounters:   08/06/19 53.9 kg (118 lb 12.8 oz)   07/17/19 54.5 kg (120 lb 3.2 oz)   07/16/19 52.6 kg (116 lb)     GEN: NAD  HEENT: EOMI, no icterus, injection or pallor. Oropharynx clear.  LUNGS: clear bilaterally  CV: regular, no murmurs, rubs, or gallops  ABDOMEN: soft, non-tender, non-distended, normal bowel sounds  EXT: warm, well perfused, no edema  NEURO: alert    LABORATORY AND IMAGING STUDIES  Results for BRENT ALEJANDRE (MRN [id number]) as of 8/7/2019 13:35   8/6/2019 10:50   Sodium 137   Potassium 3.8   Chloride 102   Carbon Dioxide 26   Urea Nitrogen 14   Creatinine 0.51 (L)   GFR Estimate >90   GFR Estimate If Black >90   Calcium 9.0   Anion Gap 7   Albumin 3.7   Protein Total 8.3   Bilirubin Total 0.4   Alkaline Phosphatase 90   ALT 22   AST 18   T4 Free 1.24   TSH 2.06   Glucose 94   WBC 8.8   Hemoglobin 10.1 (L)   Hematocrit 31.2 (L)   Platelet Count 277   RBC Count 3.27 (L)   MCV 95   MCH 30.9   MCHC 32.4   RDW 18.5 (H)   Diff Method Automated Method   % Neutrophils 82.9   % Lymphocytes 10.2   % Monocytes 5.6   % Eosinophils 0.1   % Basophils 0.3   % Immature Granulocytes 0.9   Nucleated RBCs 0   Absolute Neutrophil 7.3   Absolute Lymphocytes 0.9   Absolute Monocytes 0.5   Absolute Eosinophils 0.0   Absolute Basophils 0.0   Abs Immature Granulocytes 0.1   Absolute Nucleated RBC 0.0     Results for orders placed or performed in visit on 08/02/19   CT Chest/Abdomen/Pelvis w Contrast    Narrative    EXAMINATION: CT of the Chest, Abdomen and Pelvis on 8/2/2019.    INDICATION: SCLC restaging after 2 cycles of carboplatin and etoposide      COMPARISON: PET CT 6/7/2019 and CT chest 5/29/2019     TECHNIQUE: Axial images of the chest, abdomen and pelvis were obtained  with contrast. Coronal reconstructions were provided. Images were  reviewed in bone, lung, and soft tissue windows.    Dose: 337  mGy*cm    Contrast: Isovue 370 73ml    FINDINGS:    Chest: Thyroid is unremarkable large bilateral axillary lymph nodes  with the largest on the left measuring 1.4x 2.9 x 1.6 cm (previously  2.2 x 1.3 cm). Enlarged right axillary lymph node measuring 1.2 x 1.7  cm (previously 2.0 x 1.3). No appreciable supraclavicular  lymphadenopathy. Enlarged mediastinal lymph node measuring 1.2 x 1.0  series 3 image 129, with slightly necrotic appearing center. Prominent  left hilar lymph node. Heart is grossly normal in size. No significant  coronary calcifications. Aorta and pulmonary artery are normal in  caliber. Moderate aortic arch calcifications.    Once again in the left upper lobe there is a large mass measuring 3.0  x 3.0 cm, previously measured 5.1 x 4.4 cm. There is no significant  decrease in size of previously demonstrated Satellite nodules with the  nodule in the peripheral left upper lobe measuring 4 x 3 mm (series 6  image 39). Left lower lobe pleural-based nodule measuring 3 x 3 mm is  slightly smaller than previous imaging. Several sub-4 mm nonsuspicious  appearing pulmonary nodules. Centrilobular emphysema again noted with  mild intralobular septal thickening in the upper lobes. Unchanged  intrafissural lymph node in the right major fissure.      Abdomen and Pelvis:  1.1 x 0.7 cm hypodensity in the liver dome is grossly unchanged.  Remaining liver parenchyma is unremarkable. Normal contour of the  liver. The gallbladder is decompressed without evidence of  cholelithiasis. No pericholecystic fluid. Common bile duct is not  dilated. The spleen, adrenal glands and pancreas are unremarkable.    Bilateral kidneys are grossly unremarkable. No hydronephrosis, renal  mass or hydroureter. No visualized renal calculi. Bladder is  decompressed and unremarkable. Multiple pelvic phleboliths.    No pelvic mass. The largest small bowel are normal in caliber and  appearance. The appendix is not definitively visualized however  there  is no increased vascularity or edema in the right lower quadrant to  suggest acute appendicitis. The stomach is unremarkable.    Moderate distal abdominal aortic and biiliac calcifications with  moderate stenosis of the left common femoral artery. No evidence for  aneurysm. The major branches off the abdominal aorta are patent.    No significant intra-abdominal lymphadenopathy. Soft tissues are  unremarkable. No acute osseous lesions. No acute fractures are  dislocations. Mild degenerative changes of the thoracolumbar spine.        Impression    IMPRESSION: In this patient with known SCLC on immunotherapy there is  positive response to therapy without evidence of autoimmune side  effects.  1. Marked decrease in size of the left large left upper lobe lung  lesion now measuring 3 x 3 cm and decrease in size of satellite  disease.   2. Similar size bilateral axillary lymphadenopathy. Slightly improved  mediastinal left hilar lymphadenopathy.         I have personally reviewed the examination and initial interpretation  and I agree with the findings.    ISIAH DOMINIQUE MD     Imaging was personally reviewed     ASSESSMENT AND PLAN  SCLC: Good NH to 2 cycles of carbo etoposide atezolizumab. Recommended 2 more cycles and restage with CT CAP. If SD or better, will be followed by maintenance atezo until AE, progression, decision to stop.     Tobacco dependence: Working hard to quit. Down to a few cigarettes per day. Stopped chantix because it was making her feel quite bad. She's open to working with Daysi Mansfield. I'll send her a message - Daysi has tried to call before but luz marina hasn't called back.    Anxiety: Doing much better.     Mild hyperthyroidism: Has H/o Hashimoto's and is on levothyroxine. Will recheck over the next several weeks and adjust levothyroxine dose if remains elevated.    COPD: Breathing ok.     Social: She really hopes to spend hernández and maybe more in [facility], where her other daughter  lives. We settled on [facility]. I gave her the name of two thoracic oncologists there in the [facility]. Asked her to call and start the process of making an appt. She might go there in Sept.    A total of 30 minutes was spent with the patient, >50% of which was spent in counseling and coordination of care.    Candy Stubbs MD    Hematology, Oncology and Transplantation

## 2019-08-06 NOTE — NURSING NOTE
Chief Complaint   Patient presents with     Blood Draw     Labs drawn via PIV placed by vascular access. VS taken. Patient checked in for next appt.     Labs drawn from PIV placed by vascular access RN. Line flushed with saline. Vitals taken. Pt checked in for appointment.    Aliya Atkins RN

## 2019-08-06 NOTE — PROGRESS NOTES
SPIRITUAL HEALTH SERVICES  SPIRITUAL ASSESSMENT Progress Note  [facility]    REFERRAL SOURCE: Follow Up      Introduced myself to Roxana. She had recently met with chaplain Alford    At the beginning of our visit, Roxana received a long text regarding her transportation this week that required she follow up right away. Our visit came to an end.    PLAN: [facility] will continue to support Roxana while she is receiving treatment at the [facility].    Janett Ahuja Resident

## 2019-08-06 NOTE — PROGRESS NOTES
Infusion Nursing Note:  Amberly Palafox presents today for Cycle 3 Day 1 Tecentriq, Carboplatin, Etoposide.  Patient seen by provider today: Yes: Dr. Stubbs   present during visit today: Not Applicable.    Note: Patient presents to infusion today following her provider appointment. Patient denies any questions or concerns at this time.     Intravenous Access:  Peripheral IV placed.    Treatment Conditions:  Lab Results   Component Value Date    HGB 10.1 08/06/2019     Lab Results   Component Value Date    WBC 8.8 08/06/2019      Lab Results   Component Value Date    ANEU 7.3 08/06/2019     Lab Results   Component Value Date     08/06/2019      Lab Results   Component Value Date     08/06/2019                   Lab Results   Component Value Date    POTASSIUM 3.8 08/06/2019           Lab Results   Component Value Date    MAG 2.2 05/17/2019            Lab Results   Component Value Date    CR 0.51 08/06/2019                   Lab Results   Component Value Date    JASPAL 9.0 08/06/2019                Lab Results   Component Value Date    BILITOTAL 0.4 08/06/2019           Lab Results   Component Value Date    ALBUMIN 3.7 08/06/2019                    Lab Results   Component Value Date    ALT 22 08/06/2019           Lab Results   Component Value Date    AST 18 08/06/2019       Results reviewed, labs MET treatment parameters, ok to proceed with treatment.      Post Infusion Assessment:  Patient tolerated infusion without incident.  Blood return noted pre and post infusion.  Site patent and intact, free from redness, edema or discomfort.  No evidence of extravasations.  Peripheral IV left in place for tomorrow's infusion.        Discharge Plan:   Patient declined prescription refills.  Discharge instructions reviewed with: Patient.  Patient and/or family verbalized understanding of discharge instructions and all questions answered.  AVS to patient via [employer].  Patient will return 8/7/19 for next  appointment.   Patient discharged in stable condition accompanied by: self.  Departure Mode: Ambulatory.    Suzette Miller RN

## 2019-08-07 NOTE — PROGRESS NOTES
Infusion Nursing Note:  Amberly Palafox presents today for Cycle 3 Day 2 Etoposide.    Patient seen by provider today: No   present during visit today: Not Applicable.    Note: Patient reports that she experienced mild nausea over the last 24 hours. She reports that she has not taken any anti-emetics as she tries to avoid medication, and declined any IV anti-emetics be added to her tx plan today. Educated pt on nausea prevention and how to take her medications. Pt agrees to take compazine if nausea persists.     Intravenous Access:  Peripheral IV in place from 8/6/2019.    Treatment Conditions:  Results from 8/6/2019 reviewed, labs MET treatment parameters, ok to proceed with treatment.      Post Infusion Assessment:  Patient tolerated infusion without incident.  Blood return noted pre and post infusion.  Site patent and intact, free from redness, edema or discomfort.  No evidence of extravasations.  Access discontinued per patient preference      Discharge Plan:   Patient declined prescription refills.  Discharge instructions reviewed with: Patient.  Patient and/or family verbalized understanding of discharge instructions and all questions answered.  AVS to patient via [employer].  Patient will return 8/8/2019 for next infusion appointment.   Patient discharged in stable condition accompanied by: self.  Departure Mode: Ambulatory.    Genevieve Bran RN

## 2019-08-08 NOTE — PATIENT INSTRUCTIONS
Contact Numbers:     [facility] Main Line: [phone]  [facility] Triage: [phone]    Call triage to speak with triage if you are experiencing chills and/or temperature greater than or equal to 100.5, uncontrolled nausea/vomiting, diarrhea, constipation, dizziness, shortness of breath, chest pain, bleeding, unexplained bruising, or any new/concerning symptoms, questions/concerns.     If you are having any concerning symptoms or wish to speak to a provider before your next infusion visit, please call your care coordinator or triage to notify them so we can adequately serve you.     If you need a refill on a narcotic prescription, please call triage or your care coordinator before your infusion appointment.         August 2019 Sunday Monday Tuesday Wednesday Thursday Friday Saturday                       1     2    CT CHEST/ABDOMEN/PELVIS W  10:30 AM   (20 min.)   [facility]   [facility] CT 3       4     5     6    [facility] DRAW  10:15 AM   (15 min.)   [facility] DRAW   [facility] Lab Draw    [facility] RETURN  10:30 AM   (30 min.)   Candy Stubbs MD   [facility]    [facility] ONC INFUSION 180  11:30 AM   (180 min.)    ONCOLOGY INFUSION   [facility] 7    [facility] ONC INFUSION 120  10:30 AM   (120 min.)   [facility] ONCOLOGY INFUSION   [facility] 8    [facility] ONC INFUSION 120  10:30 AM   (120 min.)   [facility] ONCOLOGY INFUSION   [facility] 9     10       11     12     13     14     15     16     17       18     19     20     21     22     23     24       25     26     27    [facility] DRAW   8:00 AM   (15 min.)    [facility] DRAW   [facility] Lab Draw    [facility] RETURN   8:25 AM   (50 min.)   Norma Crespo APRN CNP   [facility]    [facility] ONC INFUSION 180   9:30 AM   (180 min.)    ONCOLOGY INFUSION   [facility] 28    [facility] ONC INFUSION 120  11:00 AM   (120 min.)   [facility] ONCOLOGY INFUSION   [facility]  Cancer Clinic 29    [facility] ONC INFUSION 120  12:00 PM   (120 min.)    ONCOLOGY INFUSION   [facility] 30 31 September 2019 Sunday Monday Tuesday Wednesday Thursday Friday Saturday   1     2     3     4     5     6     7       8     9     10     11     12     13     14       15     16     17     18     19     20    [facility] LAB DRAW  12:30 PM   (15 min.)   [facility] LAB DRAW   [facility] Lab Draw    [facility] RETURN  12:45 PM   (30 min.)   Candy Stubbs MD   [facility] ONC INFUSION 60   1:30 PM   (60 min.)    ONCOLOGY INFUSION   [facility] 21       22     23     24     25     26     27     28       29     30                                              Lab Results:  No results found for this or any previous visit (from the past 12 hour(s)).

## 2019-08-08 NOTE — PROGRESS NOTES
Infusion Nursing Note:  Amberly Palafox presents today for cycle 3 day 3 etoposide, on body neualsta.    Patient seen by provider today: No   present during visit today: Not Applicable.    Note: Pt feeling well today, no new concerns overnight.  Neulasta On Pro- On Body injector applied to patient today on lower left abdomen at 1215 with light facing up. Writer discussed Neulasta injection would start tomorrow at 1515 approximately 27 hours after application applied today.  Written and Verbal instruction reviewed with patient.  Pt instructed when the dose delivery starts, it will take about 45 minutes to complete. Pt aware Neulasta Onpro On-Body should have green flashing light and to call triage or on-call MD if injector flashes red or appears to be leaking. Pt aware to keep Onpro On-Body Neualsta 4 inches away from electrical equipment and to avoid showering 4 hours prior to injection.       Intravenous Access:  Peripheral IV placed by vascular access.    Treatment Conditions:  Lab Results   Component Value Date    HGB 10.1 08/06/2019     Lab Results   Component Value Date    WBC 8.8 08/06/2019      Lab Results   Component Value Date    ANEU 7.3 08/06/2019     Lab Results   Component Value Date     08/06/2019      Lab Results   Component Value Date     08/06/2019                   Lab Results   Component Value Date    POTASSIUM 3.8 08/06/2019           Lab Results   Component Value Date    MAG 2.2 05/17/2019            Lab Results   Component Value Date    CR 0.51 08/06/2019                   Lab Results   Component Value Date    JASPAL 9.0 08/06/2019                Lab Results   Component Value Date    BILITOTAL 0.4 08/06/2019           Lab Results   Component Value Date    ALBUMIN 3.7 08/06/2019                    Lab Results   Component Value Date    ALT 22 08/06/2019           Lab Results   Component Value Date    AST 18 08/06/2019           Post Infusion Assessment:  Patient tolerated  infusion without incident.  Blood return noted pre and post infusion.  Site patent and intact, free from redness, edema or discomfort.  No evidence of extravasations.  Access discontinued per protocol.       Discharge Plan:   Patient declined prescription refills.  Discharge instructions reviewed with: Patient.  Patient and/or family verbalized understanding of discharge instructions and all questions answered.  Copy of AVS reviewed with patient and/or family.  Patient will return 8/27/19 for next appointment.  Patient discharged in stable condition accompanied by: self.  Departure Mode: Ambulatory.    Tanvi Lopez RN

## 2019-08-15 NOTE — TELEPHONE ENCOUNTER
"Imaging called.    Pt is scheduled for a DEXA tomorrow morning, but there was no order placed.    Per last DEXA scan in 2017, was to repeat DEXA in 3 years (2020). Last OV in 06/05/19, there are no notes discussing an earlier DEXA.    Called the pt. No answer. LVM to call back.    If the pt calls back, she should be informed that there are no orders for the DEXA and can not be performed until then. We should ask her is she was either unaware of the recommendation to wait until next year, or did she receive a recommendation since then indicating an earlier DEXA.    PCP is not back in the office until next week.    - Jose \"Madi\" TAD Stanton  Triage [facility]    "

## 2019-08-15 NOTE — TELEPHONE ENCOUNTER
"Pt returned call. Disc'd preceding re: DEXA scan; plan to scan in 2020.      Scheduled for mammogram @7:45pm tomorrow, labs @8AM, DEXA @8:15AM.. Now pt states she wants to cancel all 3 appointments \"because I can have those in Sept\" Warm transferred to scheduling dept at pt request.   "

## 2019-08-15 NOTE — TELEPHONE ENCOUNTER
Tobacco Treatment Program at the [facility] attempted to reach Ms. Palafox on 8/15/2019 regarding the tobacco cessation program to help Ms. Palafox to quit smoking. We will attempt to reach Ms. Palafox another time.     [facility]  Tobacco Treatment Specialist  PH: [phone]

## 2019-08-20 NOTE — TELEPHONE ENCOUNTER
Tobacco Treatment Program at the [facility] attempted to reach Ms. Palafox on 8/20/2019 regarding the tobacco cessation program to help Ms. Palafox to quit smoking. We will attempt to reach Ms. Palafox another time.     [facility]  Tob[facility] Treatment Specialist  PH: [phone]

## 2019-08-27 NOTE — PATIENT INSTRUCTIONS
Contact Numbers  [facility]: [phone]        Please call the [facility] Triage line if you experience a temperature greater than or equal to 100.5, shaking chills, have uncontrolled nausea, vomiting and/or diarrhea, dizziness, shortness of breath, chest pain, bleeding, unexplained bruising, or if you have any other new/concerning symptoms, questions or concerns.     If it is after hours, weekends, or holidays, please call the main hospital  at  [phone] and ask to speak to the Oncology doctor on call.     If you are having any concerning symptoms or wish to speak to a provider before your next infusion visit, please call your care coordinator or triage to notify them so we can adequately serve you.     If you need a refill on a narcotic prescription or other medication, please call triage before your infusion appointment.       August 2019 Sunday Monday Tuesday Wednesday Thursday Friday Saturday                       1     2    CT CHEST/ABDOMEN/PELVIS W  10:30 AM   (20 min.)   UCCT2   [facility] CT 3       4     5     6    [facility] DRAW  10:15 AM   (15 min.)   [facility] DRAW   [facility] Lab Draw    [facility] RETURN  10:30 AM   (30 min.)   Candy Stubbs MD   [facility]  11:30 AM   (180 min.)    ONCOLOGY INFUSION   [facility] 7    [facility] ONC INFUSION 120  10:30 AM   (120 min.)    ONCOLOGY INFUSION   [facility] 8    [facility] ONC INFUSION 120  10:30 AM   (120 min.)    ONCOLOGY INFUSION   [facility] 9     10       11     12     13     14     15     16     17       18     19     20     21     22     23     24       25     26     27    [facility] DRAW   8:00 AM   (15 min.)    [facility] LAB DRAW   [facility] Lab Draw    [facility] RETURN   8:25 AM   (50 min.)   Norma Crespo APRN CNP   [facility]    [facility] ONC INFUSION 180   9:30 AM   (180  min.)   [facility] ONCOLOGY INFUSION   [facility] 28    [facility] ONC INFUSION 120  11:00 AM   (120 min.)   [facility] ONCOLOGY INFUSION   [facility] 29    [facility] ONC INFUSION 120  12:00 PM   (120 min.)   UC ONCOLOGY INFUSION   [facility] 30     31 September 2019 Sunday Monday Tuesday Wednesday Thursday Friday Saturday   1     2     3     4     5     6     7       8     9     10     11     12     13     14       15     16     17     18     19    CT CHEST/ABDOMEN/PELVIS W  10:40 AM   (20 min.)   UCCT2   [facility] CT 20    [facility] DRAW  12:30 PM   (15 min.)   [facility] DRAW   [facility] Lab Draw    [facility] RETURN  12:45 PM   (30 min.)   [facility]bs [facility]   1:30 PM   (60 min.)    ONCOLOGY INFUSION   [facility] 21       22     23     24     25     26     27     28       29     30                                              Lab Results:  Recent Results (from the past 12 hour(s))   T4, free    Collection Time: 08/27/19  8:24 AM   Result Value Ref Range    T4 Free 1.41 0.76 - 1.46 ng/dL   TSH    Collection Time: 08/27/19  8:24 AM   Result Value Ref Range    TSH 4.00 0.40 - 4.00 mU/L   CBC with platelets differential    Collection Time: 08/27/19  8:24 AM   Result Value Ref Range    WBC 9.6 4.0 - 11.0 10e9/L    RBC Count 3.01 (L) 3.8 - 5.2 10e12/L    Hemoglobin 9.9 (L) 11.7 - 15.7 g/dL    Hematocrit 30.4 (L) 35.0 - 47.0 %     (H) 78 - 100 fl    MCH 32.9 26.5 - 33.0 pg    MCHC 32.6 31.5 - 36.5 g/dL    RDW 20.4 (H) 10.0 - 15.0 %    Platelet Count 210 150 - 450 10e9/L    Diff Method Automated Method     % Neutrophils 86.2 %    % Lymphocytes 8.1 %    % Monocytes 4.6 %    % Eosinophils 0.2 %    % Basophils 0.3 %    % Immature Granulocytes 0.6 %    Nucleated RBCs 0 0 /100    Absolute Neutrophil 8.2 1.6 - 8.3 10e9/L    Absolute Lymphocytes 0.8 0.8 - 5.3 10e9/L     Absolute Monocytes 0.4 0.0 - 1.3 10e9/L    Absolute Eosinophils 0.0 0.0 - 0.7 10e9/L    Absolute Basophils 0.0 0.0 - 0.2 10e9/L    Abs Immature Granulocytes 0.1 0 - 0.4 10e9/L    Absolute Nucleated RBC 0.0    Comprehensive metabolic panel    Collection Time: 08/27/19  8:24 AM   Result Value Ref Range    Sodium 135 133 - 144 mmol/L    Potassium 3.9 3.4 - 5.3 mmol/L    Chloride 103 94 - 109 mmol/L    Carbon Dioxide 25 20 - 32 mmol/L    Anion Gap 7 3 - 14 mmol/L    Glucose 93 70 - 99 mg/dL    Urea Nitrogen 17 7 - 30 mg/dL    Creatinine 0.49 (L) 0.52 - 1.04 mg/dL    GFR Estimate >90 >60 mL/min/[1.73_m2]    GFR Estimate If Black >90 >60 mL/min/[1.73_m2]    Calcium 9.1 8.5 - 10.1 mg/dL    Bilirubin Total 0.3 0.2 - 1.3 mg/dL    Albumin 3.7 3.4 - 5.0 g/dL    Protein Total 8.7 6.8 - 8.8 g/dL    Alkaline Phosphatase 89 40 - 150 U/L    ALT 16 0 - 50 U/L    AST 15 0 - 45 U/L

## 2019-08-27 NOTE — PROGRESS NOTES
Reason for Visit: seen in f/u of high grade neuroendocrine carcinoma    Oncology HPI:   9/25/18              LDCT for screening through PCP. 3.5 x 2.9 cm ART mass  12/11/18            PET/CT. 3.3 x 3.3 cm ART mass (SUV 15.4), lobular component extending anteriorly and medially, L hilar HEATHER, bilateral axillary HEATHER; 1.8 x 1.1 cm R axillary node (SUV 2.1), 1.9 x 0.9 cm potential LN vs soft tissue nodule immediately adjacent to and anterior to R first rib and clavicle (SUV 3.1). 1 cm L periarotic and a few other small rounded retroperitoneal LN  5/29/19              CT chest w/o contrast. Mucoid impactions vs endobronchial tumor leading to ART mass, 4.4 x 3.8 cm ART mass, 0.3 cm ART nodule, 0.4, 0.3, 0.4 cm ART nodules, new since previous, 1.7 x 1.3 cm R axillary LN, 1.9 x 1.4 cm L axillary LN, additional mildly prominent bilateral axillary, subpectoral and supraclav LN, mediastinal adenopathy.   6/7/19                EBUS/bronch (Dr. Us). No endobronchial lesions. Radial EBUS transbronchial bx of ART mass  6/14/19              Brain MRI negative for mets  6/17/19              PET/CT. 5.1 x 4.4 cm ART mass (SUV 20.3), few small satellite nodules new since Dec, stable since 5/29. New LLL subpleural nodule, RLL nodule, bilateral axillary HEATHER; 2.2 x 1.3 cm L axillary node (SUV 3.7), 2.0 x 1.3 cm R axillary LN (SUV 4.1), additional hypermetabolic axillary nodes, 1.3 x 0.8 cm L peribronchial LN (SUV 4.5), L hilar adenopathy, new 0.4 cm hypodensity R liver, stable L periaortic LN.  6/20/19-current:  Carbo/etoposide/atezolizumab    Interval history: Roxana has been feeling well. No N/V. Appetite and energy are good. Returned from [place] last night. Bowel and bladder function wnl. No cough, sob, cp, palpitation, dizziness. No bone aches/pains. No headaches, vision changes. No mouth sores. No rashes, bruising, edema. Is starting to become aware of her prognosis. Didn't hear at her initial visits that her cancer was  not curable. She doesn't feel like she is dying and isn't ready to accept that.    Current Outpatient Medications   Medication Sig Dispense Refill     albuterol (PROAIR HFA/PROVENTIL HFA/VENTOLIN HFA) 108 (90 Base) MCG/ACT inhaler Inhale 2 puffs into the lungs every 4 hours as needed for shortness of breath / dyspnea 3 Inhaler 3     atorvastatin (LIPITOR) 20 MG tablet   3     B Complex-C (SUPER B COMPLEX PO) Take 1 tablet by mouth daily       cholecalciferol (VITAMIN D3) 5000 units CAPS capsule Take by mouth daily       FISH OIL        levothyroxine (SYNTHROID/LEVOTHROID) 150 MCG tablet Take 1 tablet (150 mcg) by mouth daily 90 tablet 1     Multiple Vitamins-Minerals (MULTIVITAMIN OR) Take  by mouth.       ondansetron (ZOFRAN) 8 MG tablet Take 1 tablet (8 mg) by mouth every 8 hours as needed for nausea (Patient not taking: Reported on 8/6/2019) 30 tablet 11     prochlorperazine (COMPAZINE) 10 MG tablet Take 1 tablet (10 mg) by mouth every 6 hours as needed (Nausea/Vomiting) (Patient not taking: Reported on 8/7/2019) 30 tablet 11     tiotropium (SPIRIVA RESPIMAT) 2.5 MCG/ACT inhaler Inhale 2 puffs into the lungs daily Please dispense 3 months supply 12 g 3     varenicline (CHANTIX CONTINUING MONTH RANDAL) 1 MG tablet Take 1 tablet (1 mg) by mouth 2 times daily (Patient not taking: Reported on 8/7/2019) 60 tablet 1        No Known Allergies      Exam: alert, appears well.  Blood pressure (!) 148/67, pulse 92, temperature 97.7  F (36.5  C), temperature source Oral, resp. rate 16, weight 53.4 kg (117 lb 11.2 oz), SpO2 98 %, not currently breastfeeding.  Wt Readings from Last 4 Encounters:   08/27/19 53.4 kg (117 lb 11.2 oz)   08/06/19 53.9 kg (118 lb 12.8 oz)   07/17/19 54.5 kg (120 lb 3.2 oz)   07/16/19 52.6 kg (116 lb)     Oropharynx is moist and without lesion. Neck supple and without adenopathy. Lungs:CTA except rare crackles in the bases. Heart: RRR, no murmur or rub. Abdomen: soft, nontender, BS active, no masses or  organomegaly.  Extremities: warm, no edema. Speech is clear. CN wnl. Gait/station wnl. Skin: no rashes or bruising on the exposed skin      Labs: Results for BRENT ALEJANDRE (MRN [id number]) as of 8/27/2019 09:12   Ref. Range 8/27/2019 08:24   Sodium Latest Ref Range: 133 - 144 mmol/L 135   Potassium Latest Ref Range: 3.4 - 5.3 mmol/L 3.9   Chloride Latest Ref Range: 94 - 109 mmol/L 103   Carbon Dioxide Latest Ref Range: 20 - 32 mmol/L 25   Urea Nitrogen Latest Ref Range: 7 - 30 mg/dL 17   Creatinine Latest Ref Range: 0.52 - 1.04 mg/dL 0.49 (L)   GFR Estimate Latest Ref Range: >60 mL/min/1.73_m2 >90   GFR Estimate If Black Latest Ref Range: >60 mL/min/1.73_m2 >90   Calcium Latest Ref Range: 8.5 - 10.1 mg/dL 9.1   Anion Gap Latest Ref Range: 3 - 14 mmol/L 7   Albumin Latest Ref Range: 3.4 - 5.0 g/dL 3.7   Protein Total Latest Ref Range: 6.8 - 8.8 g/dL 8.7   Bilirubin Total Latest Ref Range: 0.2 - 1.3 mg/dL 0.3   Alkaline Phosphatase Latest Ref Range: 40 - 150 U/L 89   ALT Latest Ref Range: 0 - 50 U/L 16   AST Latest Ref Range: 0 - 45 U/L 15   T4 Free Latest Ref Range: 0.76 - 1.46 ng/dL 1.41   TSH Latest Ref Range: 0.40 - 4.00 mU/L 4.00   Glucose Latest Ref Range: 70 - 99 mg/dL 93   WBC Latest Ref Range: 4.0 - 11.0 10e9/L 9.6   Hemoglobin Latest Ref Range: 11.7 - 15.7 g/dL 9.9 (L)   Hematocrit Latest Ref Range: 35.0 - 47.0 % 30.4 (L)   Platelet Count Latest Ref Range: 150 - 450 10e9/L 210   RBC Count Latest Ref Range: 3.8 - 5.2 10e12/L 3.01 (L)   MCV Latest Ref Range: 78 - 100 fl 101 (H)   MCH Latest Ref Range: 26.5 - 33.0 pg 32.9   MCHC Latest Ref Range: 31.5 - 36.5 g/dL 32.6   RDW Latest Ref Range: 10.0 - 15.0 % 20.4 (H)   Diff Method Unknown Automated Method   % Neutrophils Latest Units: % 86.2   % Lymphocytes Latest Units: % 8.1   % Monocytes Latest Units: % 4.6   % Eosinophils Latest Units: % 0.2   % Basophils Latest Units: % 0.3   % Immature Granulocytes Latest Units: % 0.6   Nucleated RBCs Latest Ref Range: 0  "/100 0   Absolute Neutrophil Latest Ref Range: 1.6 - 8.3 10e9/L 8.2   Absolute Lymphocytes Latest Ref Range: 0.8 - 5.3 10e9/L 0.8   Absolute Monocytes Latest Ref Range: 0.0 - 1.3 10e9/L 0.4   Absolute Eosinophils Latest Ref Range: 0.0 - 0.7 10e9/L 0.0   Absolute Basophils Latest Ref Range: 0.0 - 0.2 10e9/L 0.0   Abs Immature Granulocytes Latest Ref Range: 0 - 0.4 10e9/L 0.1   Absolute Nucleated RBC Unknown 0.0       Imaging: n/a    Impression/plan:   1. 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 Discussed her questions and concerns about prognosis. I reviewed that her cancer is not curable. Treatment is focused on controlling the disease and reducing side effects of the cancer.  Reviewed the role of chemotherapy and immunotherapy. She wanted to know if she could switch to a \"pill\". Reviewed that there are various treatments as options int he future. However, with a good response and good tolerance would recommend continuing current therapy. Reviewed a plan for continuing on maintenance immunotherapy if she has continued disease control.   -She would like to get her infusions in [place], [place]. I searched the internet with her and there doesn't appear to be an infusion area directly in [place]. However, there is an infusion center nearby, [facility] in [place]. She will likely pursue her oncology consultation in [place], but would like her treatments closer to home. She will call them to figure out if that is an option.  -ok to proceed with treatment today, will have f/u with Dr. Stubbs with restaging scans prior to the next cycle. If stable, will switch to atezolizumab maintenance at that time.        2. Tobacco-on Chantix and tolerating well. Struggling to cut back more than a few a day. Finds it more challenging with stress. She hasn't " reached out to Daysi Mansfield yet, but plans to.      4. Hypothyroidism, h/o Hashimoto's,  -on levothyroxine 150 mcg. Monitored TFTs every 3 weeks while on immunotherap.  -TFTs wnl today

## 2019-08-27 NOTE — PROGRESS NOTES
Infusion Nursing Note:  Amberly Palafox presents today for Cycle 4 Day 1 of Tecentriq, Carboplatin, and Etoposide.    Patient seen by provider today: Yes: Norma Crespo Np   present during visit today: Not Applicable.    Note: N/A.    Intravenous Access:  Peripheral IV placed by Vascular access    Treatment Conditions:  Lab Results   Component Value Date    HGB 9.9 08/27/2019     Lab Results   Component Value Date    WBC 9.6 08/27/2019      Lab Results   Component Value Date    ANEU 8.2 08/27/2019     Lab Results   Component Value Date     08/27/2019      Lab Results   Component Value Date     08/27/2019                   Lab Results   Component Value Date    POTASSIUM 3.9 08/27/2019           Lab Results   Component Value Date    MAG 2.2 05/17/2019            Lab Results   Component Value Date    CR 0.49 08/27/2019                   Lab Results   Component Value Date    JASPAL 9.1 08/27/2019                Lab Results   Component Value Date    BILITOTAL 0.3 08/27/2019           Lab Results   Component Value Date    ALBUMIN 3.7 08/27/2019                    Lab Results   Component Value Date    ALT 16 08/27/2019           Lab Results   Component Value Date    AST 15 08/27/2019       Results reviewed, labs MET treatment parameters, ok to proceed with treatment.      Post Infusion Assessment:  Patient tolerated infusion without incident.  Blood return noted pre and post infusion.  Site patent and intact, free from redness, edema or discomfort.  No evidence of extravasations.  Access discontinued per patient preference      Discharge Plan:   Patient declined prescription refills.  Discharge instructions reviewed with: Patient.  Patient and/or family verbalized understanding of discharge instructions and all questions answered.  AVS to patient via [employer].  Patient will return 8/28/19 for next appointment.   Patient discharged in stable condition accompanied by: self.  Departure Mode:  Ambulatory.    Christine Piedra, RN, RN

## 2019-08-27 NOTE — NURSING NOTE
"Oncology Rooming Note    August 27, 2019 8:33 AM   Amberly Palafox is a 74 year old female who presents for:    Chief Complaint   Patient presents with     Blood Draw     labs drawn via PIV by RN in lab. VS taken.      Oncology Clinic Visit     SCLC; Active Tx     Initial Vitals: BP (!) 148/67   Pulse 92   Temp 97.7  F (36.5  C) (Oral)   Resp 16   Wt 53.4 kg (117 lb 11.2 oz)   SpO2 98%   BMI 21.53 kg/m   Estimated body mass index is 21.53 kg/m  as calculated from the following:    Height as of 8/6/19: 1.575 m (5' 2\").    Weight as of this encounter: 53.4 kg (117 lb 11.2 oz). Body surface area is 1.53 meters squared.  No Pain (0) Comment: Data Unavailable   No LMP recorded. Patient is postmenopausal.  Allergies reviewed: Yes  Medications reviewed: Yes    Medications: Medication refills not needed today.  Pharmacy name entered into [employer]: [facility] [address] [place], [place] - [address]    Clinical concerns: Patient states there are no new concerns to discuss with provider.  Norma was not notified.         Dulce Crum CMA              "

## 2019-08-27 NOTE — NURSING NOTE
Chief Complaint   Patient presents with     Blood Draw     labs drawn via PIV by RN in lab. VS taken.      Labs drawn via peripheral IV. Vital signs taken. Checked into next appointment.   Cristy Trejo RN

## 2019-08-27 NOTE — LETTER
8/27/2019       RE: Amberly Palafox  [address] [place] [address]     Dear Colleague,    Thank you for referring your patient, Amberly Palafox, to the [facility]. Please see a copy of my visit note below.    Reason for Visit: seen in f/u of high grade neuroendocrine carcinoma    Oncology HPI:   9/25/18              LDCT for screening through PCP. 3.5 x 2.9 cm ART mass  12/11/18            PET/CT. 3.3 x 3.3 cm ART mass (SUV 15.4), lobular component extending anteriorly and medially, L hilar HEATHER, bilateral axillary HEATHER; 1.8 x 1.1 cm R axillary node (SUV 2.1), 1.9 x 0.9 cm potential LN vs soft tissue nodule immediately adjacent to and anterior to R first rib and clavicle (SUV 3.1). 1 cm L periarotic and a few other small rounded retroperitoneal LN  5/29/19              CT chest w/o contrast. Mucoid impactions vs endobronchial tumor leading to ART mass, 4.4 x 3.8 cm ART mass, 0.3 cm ART nodule, 0.4, 0.3, 0.4 cm ART nodules, new since previous, 1.7 x 1.3 cm R axillary LN, 1.9 x 1.4 cm L axillary LN, additional mildly prominent bilateral axillary, subpectoral and supraclav LN, mediastinal adenopathy.   6/7/19                EBUS/bronch (Dr. Us). No endobronchial lesions. Radial EBUS transbronchial bx of ART mass  6/14/19              Brain MRI negative for mets  6/17/19              PET/CT. 5.1 x 4.4 cm ART mass (SUV 20.3), few small satellite nodules new since Dec, stable since 5/29. New LLL subpleural nodule, RLL nodule, bilateral axillary HEATHER; 2.2 x 1.3 cm L axillary node (SUV 3.7), 2.0 x 1.3 cm R axillary LN (SUV 4.1), additional hypermetabolic axillary nodes, 1.3 x 0.8 cm L peribronchial LN (SUV 4.5), L hilar adenopathy, new 0.4 cm hypodensity R liver, stable L periaortic LN.  6/20/19-current:  Carbo/etoposide/atezolizumab    Interval history: Roxana has been feeling well. No N/V. Appetite and energy are good. Returned from [place] last night. Bowel and bladder  function wnl. No cough, sob, cp, palpitation, dizziness. No bone aches/pains. No headaches, vision changes. No mouth sores. No rashes, bruising, edema. Is starting to become aware of her prognosis. Didn't hear at her initial visits that her cancer was not curable. She doesn't feel like she is dying and isn't ready to accept that.    Current Outpatient Medications   Medication Sig Dispense Refill     albuterol (PROAIR HFA/PROVENTIL HFA/VENTOLIN HFA) 108 (90 Base) MCG/ACT inhaler Inhale 2 puffs into the lungs every 4 hours as needed for shortness of breath / dyspnea 3 Inhaler 3     atorvastatin (LIPITOR) 20 MG tablet   3     B Complex-C (SUPER B COMPLEX PO) Take 1 tablet by mouth daily       cholecalciferol (VITAMIN D3) 5000 units CAPS capsule Take by mouth daily       FISH OIL        levothyroxine (SYNTHROID/LEVOTHROID) 150 MCG tablet Take 1 tablet (150 mcg) by mouth daily 90 tablet 1     Multiple Vitamins-Minerals (MULTIVITAMIN OR) Take  by mouth.       ondansetron (ZOFRAN) 8 MG tablet Take 1 tablet (8 mg) by mouth every 8 hours as needed for nausea (Patient not taking: Reported on 8/6/2019) 30 tablet 11     prochlorperazine (COMPAZINE) 10 MG tablet Take 1 tablet (10 mg) by mouth every 6 hours as needed (Nausea/Vomiting) (Patient not taking: Reported on 8/7/2019) 30 tablet 11     tiotropium (SPIRIVA RESPIMAT) 2.5 MCG/ACT inhaler Inhale 2 puffs into the lungs daily Please dispense 3 months supply 12 g 3     varenicline (CHANTIX CONTINUING MONTH RANDAL) 1 MG tablet Take 1 tablet (1 mg) by mouth 2 times daily (Patient not taking: Reported on 8/7/2019) 60 tablet 1        No Known Allergies      Exam: alert, appears well.  Blood pressure (!) 148/67, pulse 92, temperature 97.7  F (36.5  C), temperature source Oral, resp. rate 16, weight 53.4 kg (117 lb 11.2 oz), SpO2 98 %, not currently breastfeeding.  Wt Readings from Last 4 Encounters:   08/27/19 53.4 kg (117 lb 11.2 oz)   08/06/19 53.9 kg (118 lb 12.8 oz)   07/17/19 54.5  kg (120 lb 3.2 oz)   07/16/19 52.6 kg (116 lb)     Oropharynx is moist and without lesion. Neck supple and without adenopathy. Lungs:CTA except rare crackles in the bases. Heart: RRR, no murmur or rub. Abdomen: soft, nontender, BS active, no masses or organomegaly.  Extremities: warm, no edema. Speech is clear. CN wnl. Gait/station wnl. Skin: no rashes or bruising on the exposed skin      Labs: Results for BRENT ALEJANDRE (MRN [id number]) as of 8/27/2019 09:12   Ref. Range 8/27/2019 08:24   Sodium Latest Ref Range: 133 - 144 mmol/L 135   Potassium Latest Ref Range: 3.4 - 5.3 mmol/L 3.9   Chloride Latest Ref Range: 94 - 109 mmol/L 103   Carbon Dioxide Latest Ref Range: 20 - 32 mmol/L 25   Urea Nitrogen Latest Ref Range: 7 - 30 mg/dL 17   Creatinine Latest Ref Range: 0.52 - 1.04 mg/dL 0.49 (L)   GFR Estimate Latest Ref Range: >60 mL/min/1.73_m2 >90   GFR Estimate If Black Latest Ref Range: >60 mL/min/1.73_m2 >90   Calcium Latest Ref Range: 8.5 - 10.1 mg/dL 9.1   Anion Gap Latest Ref Range: 3 - 14 mmol/L 7   Albumin Latest Ref Range: 3.4 - 5.0 g/dL 3.7   Protein Total Latest Ref Range: 6.8 - 8.8 g/dL 8.7   Bilirubin Total Latest Ref Range: 0.2 - 1.3 mg/dL 0.3   Alkaline Phosphatase Latest Ref Range: 40 - 150 U/L 89   ALT Latest Ref Range: 0 - 50 U/L 16   AST Latest Ref Range: 0 - 45 U/L 15   T4 Free Latest Ref Range: 0.76 - 1.46 ng/dL 1.41   TSH Latest Ref Range: 0.40 - 4.00 mU/L 4.00   Glucose Latest Ref Range: 70 - 99 mg/dL 93   WBC Latest Ref Range: 4.0 - 11.0 10e9/L 9.6   Hemoglobin Latest Ref Range: 11.7 - 15.7 g/dL 9.9 (L)   Hematocrit Latest Ref Range: 35.0 - 47.0 % 30.4 (L)   Platelet Count Latest Ref Range: 150 - 450 10e9/L 210   RBC Count Latest Ref Range: 3.8 - 5.2 10e12/L 3.01 (L)   MCV Latest Ref Range: 78 - 100 fl 101 (H)   MCH Latest Ref Range: 26.5 - 33.0 pg 32.9   MCHC Latest Ref Range: 31.5 - 36.5 g/dL 32.6   RDW Latest Ref Range: 10.0 - 15.0 % 20.4 (H)   Diff Method Unknown Automated Method   %  "Neutrophils Latest Units: % 86.2   % Lymphocytes Latest Units: % 8.1   % Monocytes Latest Units: % 4.6   % Eosinophils Latest Units: % 0.2   % Basophils Latest Units: % 0.3   % Immature Granulocytes Latest Units: % 0.6   Nucleated RBCs Latest Ref Range: 0 /100 0   Absolute Neutrophil Latest Ref Range: 1.6 - 8.3 10e9/L 8.2   Absolute Lymphocytes Latest Ref Range: 0.8 - 5.3 10e9/L 0.8   Absolute Monocytes Latest Ref Range: 0.0 - 1.3 10e9/L 0.4   Absolute Eosinophils Latest Ref Range: 0.0 - 0.7 10e9/L 0.0   Absolute Basophils Latest Ref Range: 0.0 - 0.2 10e9/L 0.0   Abs Immature Granulocytes Latest Ref Range: 0 - 0.4 10e9/L 0.1   Absolute Nucleated RBC Unknown 0.0       Imaging: n/a    Impression/plan:   1. 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 Discussed her questions and concerns about prognosis. I reviewed that her cancer is not curable. Treatment is focused on controlling the disease and reducing side effects of the cancer.  Reviewed the role of chemotherapy and immunotherapy. She wanted to know if she could switch to a \"pill\". Reviewed that there are various treatments as options int he future. However, with a good response and good tolerance would recommend continuing current therapy. Reviewed a plan for continuing on maintenance immunotherapy if she has continued disease control.   -She would like to get her infusions in [place]. I searched the internet with her and there doesn't appear to be an infusion area directly in [place]. However, there is an infusion center nearby, [facility] in [place]. She will likely pursue her oncology consultation in [place], but would like her treatments closer to home. She will call them to figure out if that is an option.  -ok to proceed with treatment today, will have f/u with Dr. Stubbs " with restaging scans prior to the next cycle. If stable, will switch to atezolizumab maintenance at that time.        2. Tobacco-on Chantix and tolerating well. Struggling to cut back more than a few a day. Finds it more challenging with stress. She hasn't reached out to Days[facility] Mansfield yet, but plans to.      4. Hypothyroidism, h/o Hashimoto's,  -on levothyroxine 150 mcg. Monitored TFTs every 3 weeks while on immunotherap.  -TFTs wnl today    Again, thank you for allowing me to participate in the care of your patient.      Sincerely,    CARLY Bhatt CNP

## 2019-08-28 NOTE — PATIENT INSTRUCTIONS
[facility] Main Line: [phone]    Call triage nurse with chills and/or temperature greater than or equal to 100.4, uncontrolled nausea/vomiting, diarrhea, constipation, dizziness, shortness of breath, chest pain, bleeding, unexplained bruising, or any new/concerning symptoms, questions/concerns.   If you are having any concerning symptoms or wish to speak to a provider before your next infusion visit, please call your care coordinator or triage to notify them so we can adequately serve you.   Nurse Triage line:  [phone]    If after hours, weekends, or holidays, call main hospital  and ask for Oncology doctor on call @ [phone]      August 2019 Sunday Monday Tuesday Wednesday Thursday Friday Saturday                       1     2    CT CHEST/ABDOMEN/PELVIS W  10:30 AM   (20 min.)   UCCT2   [facility] CT 3       4     5     6    [facility] DRAW  10:15 AM   (15 min.)    [facility] LAB DRAW   [facility] Lab Draw    [facility] RETURN  10:30 AM   (30 min.)   Candy Stubbs MD   [facility]    [facility] ONC INFUSION 180  11:30 AM   (180 min.)   [facility] ONCOLOGY INFUSION   [facility] 7    [facility] ONC INFUSION 120  10:30 AM   (120 min.)   [facility] ONCOLOGY INFUSION   [facility] 8    [facility] ONC INFUSION 120  10:30 AM   (120 min.)   [facility] ONCOLOGY INFUSION   [facility] 9     10       11     12     13     14     15     16     17       18     19     20     21     22     23     24       25     26     27    [facility] DRAW   8:00 AM   (15 min.)   [facility] DRAW   [facility] Lab Draw    [facility] RETURN   8:25 AM   (50 min.)   Norma Crespo APRN CNP   [facility]    [facility] ONC INFUSION 180   9:30 AM   (180 min.)   [facility] ONCOLOGY INFUSION   [facility] 28    [facility] ONC INFUSION 120  11:00 AM   (120 min.)   [facility] ONCOLOGY INFUSION   [facility] 29    [facility] ONC INFUSION  120  12:00 PM   (120 min.)    ONCOLOGY INFUSION   [facility] 30 31 September 2019 Sunday Monday Tuesday Wednesday Thursday Friday Saturday   1     2     3     4     5     6     7       8     9     10     11     12     13     14       15     16     17     18     19    CT CHEST/ABDOMEN/PELVIS W  10:40 AM   (20 min.)   UCCT2   [facility] CT 20    [facility] LAB DRAW  12:30 PM   (15 min.)   [facility] LAB DRAW   [facility] Lab Draw    [facility] RETURN  12:45 PM   (30 min.)   Candy Stubbs MD   [facility] ONC INFUSION 60   1:30 PM   (60 min.)    ONCOLOGY INFUSION   [facility] 21       22     23     24     25     26     27     28       29     30                                              Lab Results:  No results found for this or any previous visit (from the past 12 hour(s)).

## 2019-08-28 NOTE — PROGRESS NOTES
Infusion Nursing Note:  Amberly Palafox presents today for C4D2 Etoposide.    Patient seen by provider today: No   present during visit today: Not Applicable.    Note: Patient reported to clinic with no new concerns today. Patient did report some anxiety about starting her PIV, stating they had a hard time yesterday. Warm pack and warm blanket applied to left arm, Vascular Access called.    Intravenous Access:  Peripheral IV placed by Vascular Access using US.    Treatment Conditions:  Lab Results   Component Value Date    HGB 9.9 08/27/2019     Lab Results   Component Value Date    WBC 9.6 08/27/2019      Lab Results   Component Value Date    ANEU 8.2 08/27/2019     Lab Results   Component Value Date     08/27/2019      Lab Results   Component Value Date     08/27/2019                   Lab Results   Component Value Date    POTASSIUM 3.9 08/27/2019           Lab Results   Component Value Date    MAG 2.2 05/17/2019            Lab Results   Component Value Date    CR 0.49 08/27/2019                   Lab Results   Component Value Date    JASPAL 9.1 08/27/2019                Lab Results   Component Value Date    BILITOTAL 0.3 08/27/2019           Lab Results   Component Value Date    ALBUMIN 3.7 08/27/2019                    Lab Results   Component Value Date    ALT 16 08/27/2019           Lab Results   Component Value Date    AST 15 08/27/2019       Results reviewed, labs MET treatment parameters, ok to proceed with treatment.      Post Infusion Assessment:  Patient tolerated infusion without incident.  Blood return noted pre and post infusion.  Site patent and intact, free from redness, edema or discomfort.  No evidence of extravasations.  Access left in per patient request for tomorrow's infusion. Flushed with ease and had brisk blood return.    Discharge Plan:   Patient declined prescription refills.  Discharge instructions reviewed with: Patient.  Patient and/or family verbalized  understanding of discharge instructions and all questions answered.  AVS to patient via [employer].  Patient will return 8/29/19 for next appointment.   Patient discharged in stable condition accompanied by: self.  Departure Mode: Ambulatory.  Face to Face time: 0 minutes.    Sammy Polk RN, RN

## 2019-08-29 NOTE — PATIENT INSTRUCTIONS
[facility] Triage and after hours / weekends / holidays:  [phone]    Please call the triage or after hours line if you experience a temperature greater than or equal to 100.5, shaking chills, have uncontrolled nausea, vomiting and/or diarrhea, dizziness, shortness of breath, chest pain, bleeding, unexplained bruising, or if you have any other new/concerning symptoms, questions or concerns.      If you are having any concerning symptoms or wish to speak to a provider before your next infusion visit, please call your care coordinator or triage to notify them so we can adequately serve you.     If you need a refill on a narcotic prescription or other medication, please call before your infusion appointment.

## 2019-08-29 NOTE — PROGRESS NOTES
Infusion Nursing Note:  Amberly Palafox presents today for Cycle 4 Day 3 Etoposide and Onbody Neulasta.    Patient seen and examined by Norma Crespo NP in clinic on 8/27/19    Note: Pt states the past couple days she has felt ok.  She reports some fatigue and some mild nausea but has not used her antiemetics.  She states he appetite has been great.     Neulasta Onpro On-Body injector applied to LLQ abdomen at 1345 with light facing up.  Writer discussed Neulasta injection would start tomorrow 8/30/19 at 1645, approximately 27 hours after application applied today.  Written and Verbal instruction reviewed with patient.  Pt instructed when the dose delivery starts, it will take about 45 minutes to complete.  Pt aware Neulasta Onpro On-Body should have green flashing light and to call triage or on-call MD if injector flashes red or appears to be leaking. Pt aware to keep Onpro On-Body Neulasta 4 inches away from electrical equipment and to avoid showering 4 hours prior to injection.   Neulasta Onpro Lot number: [id number]      Intravenous Access:  Peripheral IV placed. By vascular access on 8/28/19  Positive blood return pre and post etoposide    Treatment Conditions:  Lab Results   Component Value Date    HGB 9.9 08/27/2019     Lab Results   Component Value Date    WBC 9.6 08/27/2019      Lab Results   Component Value Date    ANEU 8.2 08/27/2019     Lab Results   Component Value Date     08/27/2019      Lab Results   Component Value Date     08/27/2019                   Lab Results   Component Value Date    POTASSIUM 3.9 08/27/2019           Lab Results   Component Value Date    MAG 2.2 05/17/2019            Lab Results   Component Value Date    CR 0.49 08/27/2019                   Lab Results   Component Value Date    JASPAL 9.1 08/27/2019                Lab Results   Component Value Date    BILITOTAL 0.3 08/27/2019           Lab Results   Component Value Date    ALBUMIN 3.7 08/27/2019                     Lab Results   Component Value Date    ALT 16 08/27/2019           Lab Results   Component Value Date    AST 15 08/27/2019       Results reviewed, labs MET treatment parameters, ok to proceed with treatment.      Post Infusion Assessment:  Patient tolerated infusion without incident.       Discharge Plan:   Patient declined prescription refills.  Copy of AVS reviewed with patient and/or family.  Patient will return on 9/20 to see Dr Stubbs in clinic and start an immunotherapy. Face to Face time: 0.    Amelie Whitfield, RN, RN

## 2019-08-30 NOTE — NURSING NOTE
Patient presents to the [facility] Infusion for pegfilgrastim (NEULASTA) injection 6 mg. Order written by Candy Multani MD was completed today. Name and  were verified by patient. See MAR for medication details. Patient was asked if they have any new symptoms or questions/concerns. Patient [declined/requested] to speak with an RN today.Medication was given in the following site: Subcutaneous Abdominal Tissue.  Patient tolerated injection well and was discharged to home.    Binta Watson CMA

## 2019-09-10 NOTE — TELEPHONE ENCOUNTER
Duplicate request. See [employer] refill encounter 09/10/19. Closing request.    Perla Llamas RN  [facility]

## 2019-09-10 NOTE — TELEPHONE ENCOUNTER
"Requested Prescriptions   Pending Prescriptions Disp Refills     SPIRIVA RESPIMAT 2.5 MCG/ACT inhaler [Pharmacy Med Name: SPIRIVA RESPIMAT INH [id number] 2.5MCG]    Last Written Prescription Date:  5/17/2019  Last Fill Quantity: 12 g,  # refills: 3   Last office visit: 6/5/2019 with prescribing provider:  Luz Lopez     Future Office Visit:     12 g 4     Sig: USE 2 INHALATIONS DAILY       Asthma Maintenance Inhalers - Anticholinergics Passed - 9/10/2019  8:24 AM        Passed - Patient is age 12 years or older        Passed - Recent (12 mo) or future (30 days) visit within the authorizing provider's specialty     Patient had office visit in the last 12 months or has a visit in the next 30 days with authorizing provider or within the authorizing provider's specialty.  See \"Patient Info\" tab in inbasket, or \"Choose Columns\" in Meds & Orders section of the refill encounter.              Passed - Medication is active on med list          "

## 2019-09-10 NOTE — TELEPHONE ENCOUNTER
Reason for call:  Medication   If this is a refill request, has the caller requested the refill from the pharmacy already? No  Will the patient be using a [facility]? No  Name of the [facility] the [facility]    Name of the medication requested: hiren    Other request: this was sent to [employer] and needs to [facility], please resend.     Phone number to reach patient:  Home number on file [phone] (home)    Best Time:  any    Can we leave a detailed message on this number?  YES

## 2019-09-10 NOTE — TELEPHONE ENCOUNTER
"Refill of tiotropium (SPIRIVA RESPIMAT) 2.5 MCG/ACT inhaler [facility].    - Jose \"Madi\" TAD Stanton  [facility]    "

## 2019-09-20 NOTE — NURSING NOTE
"Oncology Rooming Note    September 20, 2019 12:50 PM   Amberly Palafox is a 74 year old female who presents for:    Chief Complaint   Patient presents with     Oncology Clinic Visit     Returnl Small Cell Lung Ca     Blood Draw     labs drawn via piv by RN in lab. VS taken.      Initial Vitals: /67   Pulse 100   Temp 98.5  F (36.9  C) (Oral)   Resp 16   Wt 53.2 kg (117 lb 3.2 oz)   SpO2 97%   BMI 21.44 kg/m   Estimated body mass index is 21.44 kg/m  as calculated from the following:    Height as of 8/30/19: 1.575 m (5' 2\").    Weight as of this encounter: 53.2 kg (117 lb 3.2 oz). Body surface area is 1.53 meters squared.  No Pain (0) Comment: Data Unavailable   No LMP recorded. Patient is postmenopausal.  Allergies reviewed: Yes  Medications reviewed: Yes    Medications: Medication refills not needed today.  Pharmacy name entered into [employer]:    [facility] [address] [place] [address] [address] [place] [address]  [facility] [address]    Clinical concerns: No new concerns.       Sharron Cuevas CMA              "

## 2019-09-20 NOTE — LETTER
9/20/2019       RE: Amberly Palafox  [address] [place] [address]     Dear Colleague,    Thank you for referring your patient, Amberly Palafox, to the [facility]. Please see a copy of my visit note below.    [facility]  [facility]    FOLLOW-UP VISIT NOTE    PATIENT NAME: Amberly Palafox MRN # [id number]  DATE OF VISIT: September 20, 2019 YOB: 1945    CANCER TYPE: High grade neuroendocrine carcinoma  STAGE: Extensive  ECOG PS: 1    Cancer Staging  Small cell lung cancer (H)  Staging form: Lung, AJCC 8th Edition  - Clinical stage from 6/19/2019: Stage IV (cT2a, cN3, pM1c) - Signed by Candy Stubbs MD on 6/19/2019    PD-L1:  NGS:    SUMMARY  9/25/18 LDCT for screening through PCP. 3.5 x 2.9 cm ART mass  12/11/18 PET/CT. 3.3 x 3.3 cm ART mass (SUV 15.4), lobular component extending anteriorly and medially, L hilar HEATHER, bilateral axillary HEATHER; 1.8 x 1.1 cm R axillary node (SUV 2.1), 1.9 x 0.9 cm potential LN vs soft tissue nodule immediately adjacent to and anterior to R first rib and clavicle (SUV 3.1). 1 cm L periarotic and a few other small rounded retroperitoneal LN  5/29/19 CT chest w/o contrast. Mucoid impactions vs endobronchial tumor leading to ART mass, 4.4 x 3.8 cm ART mass, 0.3 cm ART nodule, 0.4, 0.3, 0.4 cm ART nodules, new since previous, 1.7 x 1.3 cm R axillary LN, 1.9 x 1.4 cm L axillary LN, additional mildly prominent bilateral axillary, subpectoral and supraclav LN, mediastinal adenopathy.   6/7/19 EBUS/bronch (Dr. Us). No endobronchial lesions. Radial EBUS transbronchial bx of ART mass  6/14/19 Brain MRI negative for mets  6/17/19 PET/CT. 5.1 x 4.4 cm ART mass (SUV 20.3), few small satellite nodules new since Dec, stable since 5/29. New LLL subpleural nodule, RLL nodule, bilateral axillary HEATHER; 2.2 x 1.3 cm L axillary node (SUV 3.7), 2.0 x 1.3 cm R axillary LN (SUV 4.1), additional hypermetabolic axillary  nodes, 1.3 x 0.8 cm L peribronchial LN (SUV 4.5), L hilar adenopathy, new 0.4 cm hypodensity R liver, stable L periaortic LN.  6/20~8/29/19     DENZEL Schmidt returns today for follow up of SCLC after 4 cycles of carbo etoposide atezo. Doing fine. Even still has her hair. No new sxs. Energy good, appetite excellent. No shortness of breath, HA, vision changes, N/V, cough, chest pain/pressure, abdominal pain, constipation, diarrhea, numbness/tingling, bleeding.     Going to [facility] tomorrow. Not sure whether she'll come back to [place] or not.     Still smoking. Hasn't contacted Daysi Mansfield to discuss cessation yet.    PAST MEDICAL HISTORY  SCLC as above  COPD. PFT 2013 FEV1 1.08 (48%), FVC 2.02 (69%), DLCOcor 12.9 (63%)  H/o Hashimoto's thyroiditis, now hypothyroidism  Osteopenia  Dyslipidemia  Vitamin D deficiency    CURRENT OUTPATIENT MEDICATIONS  Current Outpatient Medications   Medication Sig Dispense Refill     albuterol (PROAIR HFA/PROVENTIL HFA/VENTOLIN HFA) 108 (90 Base) MCG/ACT inhaler Inhale 2 puffs into the lungs every 4 hours as needed for shortness of breath / dyspnea 3 Inhaler 3     atorvastatin (LIPITOR) 20 MG tablet   3     B Complex-C (SUPER B COMPLEX PO) Take 1 tablet by mouth daily       cholecalciferol (VITAMIN D3) 5000 units CAPS capsule Take by mouth daily       FISH OIL        levothyroxine (SYNTHROID/LEVOTHROID) 150 MCG tablet Take 1 tablet (150 mcg) by mouth daily 90 tablet 1     Multiple Vitamins-Minerals (MULTIVITAMIN OR) Take  by mouth.       prochlorperazine (COMPAZINE) 10 MG tablet Take 1 tablet (10 mg) by mouth every 6 hours as needed (Nausea/Vomiting) 30 tablet 11     tiotropium (SPIRIVA RESPIMAT) 2.5 MCG/ACT inhaler Inhale 2 puffs into the lungs daily 12 g 0     varenicline (CHANTIX CONTINUING MONTH RANDAL) 1 MG tablet Take 1 tablet (1 mg) by mouth 2 times daily 60 tablet 1     ondansetron (ZOFRAN) 8 MG tablet Take 1 tablet (8 mg) by mouth every 8 hours as needed for nausea (Patient not  taking: Reported on 9/20/2019) 30 tablet 11     ALLERGIES  No Known Allergies    REVIEW OF SYSTEMS  As above in the HPI, o/w complete 12-point ROS was negative.    PHYSICAL EXAM  /67   Pulse 100   Temp 98.5  F (36.9  C) (Oral)   Resp 16   Wt 53.2 kg (117 lb 3.2 oz)   SpO2 97%   BMI 21.44 kg/m     Wt Readings from Last 3 Encounters:   08/30/19 53.1 kg (117 lb)   08/27/19 53.4 kg (117 lb 11.2 oz)   08/06/19 53.9 kg (118 lb 12.8 oz)     GEN: NAD  HEENT: EOMI, no icterus, injection or pallor  LUNGS: clear bilaterally  CV: regular, no murmurs, rubs, or gallops  ABDOMEN: soft, non-tender, non-distended  EXT: warm, no edema  NEURO: alert  SKIN: no rashes    LABORATORY AND IMAGING STUDIES  Results for BRENT ALEJANDRE (MRN [id number]) as of 9/20/2019 13:58   9/20/2019 12:45   Sodium 135   Potassium 3.7   Chloride 102   Carbon Dioxide 28   Urea Nitrogen 17   Creatinine 0.50 (L)   GFR Estimate >90   GFR Estimate If Black >90   Calcium 8.8   Anion Gap 5   Albumin 3.9   Protein Total 8.7   Bilirubin Total 0.3   Alkaline Phosphatase 92   ALT 15   AST 14   TSH 3.63   Glucose 114 (H)   WBC 7.0   Hemoglobin 10.6 (L)   Hematocrit 32.3 (L)   Platelet Count 272   RBC Count 3.13 (L)    (H)   MCH 33.9 (H)   MCHC 32.8   RDW 18.4 (H)   Diff Method Automated Method   % Neutrophils 82.1   % Lymphocytes 10.1   % Monocytes 6.3   % Eosinophils 0.6   % Basophils 0.3   % Immature Granulocytes 0.6   Nucleated RBCs 0   Absolute Neutrophil 5.7   Absolute Lymphocytes 0.7 (L)   Absolute Monocytes 0.4   Absolute Eosinophils 0.0   Absolute Basophils 0.0   Abs Immature Granulocytes 0.0   Absolute Nucleated RBC 0.0     Results for orders placed or performed in visit on 09/19/19   CT Chest/Abdomen/Pelvis w Contrast    Narrative    CT CHEST, ABDOMEN AND PELVIS WITH CONTRAST  9/19/2019 11:04 AM    HISTORY: Restage SCLC, after 4 cycles of chemotherapy + atezolizumab  (immunotherapy). Small cell lung cancer (H)    TECHNIQUE: CT scan  obtained of the chest, abdomen, and pelvis with  oral and IV contrast. Isovue 370, 72 mL IV injected. Radiation dose  for this scan was reduced using automated exposure control, adjustment  of the mA and/or kV according to patient size, or iterative  reconstruction technique.    COMPARISON: 8/2/2019 CT chest, abdomen and pelvis    FINDINGS:  Chest: Redemonstrated mild bilateral axillary adenopathy without  significant interval change. Representative axillary node on the right  1.6 x 1.3 cm previously 1.8 x 1.3 cm. No mediastinal or hilar  adenopathy. Stable appearing 1.0 cm precarinal node on series 4 image  72, no adenopathy confirmed in this location on coronal image.    Left upper lobe mass in the posterior left upper lobe redemonstrated  3.0 x 2.4 cm previously 3.3 x 2.7 cm when measured at the same level.  This is measured on series 4 image 50 and appears slightly decreased  in size. Slightly irregular margins. Craniocaudal dimension 2.8,  previously 3.1 cm. Redemonstrated 2 areas of focal fissural thickening  in the right mid lung series 8 image 62 both 0.6 x 0.2 cm in size.  Stable tiny elongated left upper lobe nodular opacity series 4 image  47. There are emphysematous changes. No pleural effusion. Small amount  of material in the dependent trachea series 4 image 55 and 58 is  indeterminate, could be mucous.    Abdomen and pelvis: Stable subcentimeter low dense lesion at the  posterior dome of the liver with ill-defined margins series 2 image 54  too small to characterize. Liver otherwise appears normal.  Normal-appearing gallbladder, spleen, pancreas, and adrenal glands.  Stable subcentimeter low dense upper right kidney lesions which are  too small for definitive characterization but statistically consistent  with small cysts. There is extensive atherosclerotic vascular  calcification and plaque throughout the aorta and iliac arteries. No  periaortic or pelvic adenopathy.    Apparent gastric wall  thickening diffusely could be due to under  distention particularly as this was slightly more distended on  8/2/2019. No acute appearing bowel abnormality or aggressive bone  lesions.      Impression    IMPRESSION:  1. Very slight decrease in size of left upper lobe mass consistent  with the patient's known small cell lung cancer since 8/2/2018  currently 3.0 x 2.4 x 2.8 cm in size.  2. Stable tiny left upper lobe elongated nodule 0.3 x 0.2 cm on series  4 image 46.  3. No significant change in mild bilateral axillary adenopathy.  4. Thickened appearance of gastric wall identified. Nonspecific but  may be related to decompression/contraction since the gastric wall did  not appear thickened on 8/2/2019.  5. Indeterminate subcentimeter low dense lesion posterior superior  right lobe liver. Not seen on 7/23/2013 chest CT. Attention to this  area on follow-up scans. Possibly was present but not hypermetabolic  on 12/11/2018. Very difficult to visualize on PET CT.    JOVANNA GLEASON MD     Imaging was personally reviewed     ASSESSMENT AND PLAN  SCLC: Good MI to 4 cycles of carboplatin etoposide atezolizumab. Continue maintenance atezo. Restage after 3 cycles. Discussed that there's no role right now for consolidative chest radiotherapy or PCI. Moving to [facility] tomorrow. Not sure she'll return to [place]. Of course, I'm around to help in any way I can. She's also not sure whether she'll consult with the lung cancer team at [place] in [place]; she'd like to get settled and then figure that out. There is an oncology clinic close to where she'll be. I encouraged her to make an appointment quickly. Will not schedule follow up. Asked her to call if she returns to [place].    Tobacco dependence: Encouraged cessation and to look for a program in [facility].    Anxiety: Doing ok.     Mild hyperthyroidism: Has H/o Hashimoto's and is on levothyroxine. TFTs have been ok after T4 was mildly elevated in May and June. Was 1.41 on 8/27;  TSH was 3.63.     COPD: Doing fine.    Social: Moving vs visiting [facility] starting tomorrow.    A total of 40 minutes was spent with the patient, >50% of which was spent in counseling and coordination of care.    Candy Stubbs MD    Hematology, Oncology and Transplantation

## 2019-09-20 NOTE — PROGRESS NOTES
Infusion Nursing Note:  Amberly Palafox presents today for Cycle 5 Day 1 of Tecentriq.    Patient seen by provider today: Yes: Enoc   present during visit today: Not Applicable.    Note: N/A.    Intravenous Access:  Peripheral IV placed.    Treatment Conditions:  Lab Results   Component Value Date    HGB 10.6 09/20/2019     Lab Results   Component Value Date    WBC 7.0 09/20/2019      Lab Results   Component Value Date    ANEU 5.7 09/20/2019     Lab Results   Component Value Date     09/20/2019      Lab Results   Component Value Date     09/20/2019                   Lab Results   Component Value Date    POTASSIUM 3.7 09/20/2019           Lab Results   Component Value Date    MAG 2.2 05/17/2019            Lab Results   Component Value Date    CR 0.50 09/20/2019                   Lab Results   Component Value Date    JASPAL 8.8 09/20/2019                Lab Results   Component Value Date    BILITOTAL 0.3 09/20/2019           Lab Results   Component Value Date    ALBUMIN 3.9 09/20/2019                    Lab Results   Component Value Date    ALT 15 09/20/2019           Lab Results   Component Value Date    AST 14 09/20/2019       Results reviewed, labs MET treatment parameters, ok to proceed with treatment.      Post Infusion Assessment:  Patient tolerated infusion without incident.  Blood return noted pre and post infusion.  Site patent and intact, free from redness, edema or discomfort.  No evidence of extravasations.  Access discontinued per protocol.       Discharge Plan:   Patient declined prescription refills.  Discharge instructions reviewed with: Patient.  Patient and/or family verbalized understanding of discharge instructions and all questions answered.  AVS to patient via [facility].  Patient moving to [place], will receive treatment there going forward  Patient discharged in stable condition accompanied by: self.  Departure Mode: Ambulatory.    Christine Piedra RN,  RN

## 2019-09-20 NOTE — PATIENT INSTRUCTIONS
Contact Numbers  [facility]: [phone]        Please call the [facility] Triage line if you experience a temperature greater than or equal to 100.5, shaking chills, have uncontrolled nausea, vomiting and/or diarrhea, dizziness, shortness of breath, chest pain, bleeding, unexplained bruising, or if you have any other new/concerning symptoms, questions or concerns.     If it is after hours, weekends, or holidays, please call the main hospital  at  [phone] and ask to speak to the Oncology doctor on call.     If you are having any concerning symptoms or wish to speak to a provider before your next infusion visit, please call your care coordinator or triage to notify them so we can adequately serve you.     If you need a refill on a narcotic prescription or other medication, please call triage before your infusion appointment.       September 2019 Sunday Monday Tuesday Wednesday Thursday Friday Saturday   1     2     3     4     5     6     7       8     9     10     11     12     13     14       15     16     17     18     19    CT CHEST/ABDOMEN/PELVIS W  10:40 AM   (20 min.)   UC[facility]   [facility]    [facility]  12[facility]30 [facility]   (15 min.)   [facility] LAB DRAW   [facility] Lab Draw    [facility] RETURN  12:45 PM   (30 min.)   Candy Stubbs MD   [facility] ONC INFUSION 60   1:30 PM   (60 min.)    ONCOLOGY INFUSION   [facility] 21       22     23     24     25     26     27     28       29     30                                          October 2019 Sunday Monday Tuesday Wednesday Thursday Friday Saturday             1     2     3     4     5       6     7     8     9     10     11     12       13     14     15     16     17     18     19       20     21     22     23     24     25     26       27     28     29     30     31                               Lab Results:  Recent Results (from the past 12 hour(s))    Comprehensive metabolic panel    Collection Time: 09/20/19 12:45 PM   Result Value Ref Range    Sodium 135 133 - 144 mmol/L    Potassium 3.7 3.4 - 5.3 mmol/L    Chloride 102 94 - 109 mmol/L    Carbon Dioxide 28 20 - 32 mmol/L    Anion Gap 5 3 - 14 mmol/L    Glucose 114 (H) 70 - 99 mg/dL    Urea Nitrogen 17 7 - 30 mg/dL    Creatinine 0.50 (L) 0.52 - 1.04 mg/dL    GFR Estimate >90 >60 mL/min/[1.73_m2]    GFR Estimate If Black >90 >60 mL/min/[1.73_m2]    Calcium 8.8 8.5 - 10.1 mg/dL    Bilirubin Total 0.3 0.2 - 1.3 mg/dL    Albumin 3.9 3.4 - 5.0 g/dL    Protein Total 8.7 6.8 - 8.8 g/dL    Alkaline Phosphatase 92 40 - 150 U/L    ALT 15 0 - 50 U/L    AST 14 0 - 45 U/L   TSH with free T4 reflex    Collection Time: 09/20/19 12:45 PM   Result Value Ref Range    TSH 3.63 0.40 - 4.00 mU/L   CBC with platelets differential    Collection Time: 09/20/19 12:45 PM   Result Value Ref Range    WBC 7.0 4.0 - 11.0 10e9/L    RBC Count 3.13 (L) 3.8 - 5.2 10e12/L    Hemoglobin 10.6 (L) 11.7 - 15.7 g/dL    Hematocrit 32.3 (L) 35.0 - 47.0 %     (H) 78 - 100 fl    MCH 33.9 (H) 26.5 - 33.0 pg    MCHC 32.8 31.5 - 36.5 g/dL    RDW 18.4 (H) 10.0 - 15.0 %    Platelet Count 272 150 - 450 10e9/L    Diff Method Automated Method     % Neutrophils 82.1 %    % Lymphocytes 10.1 %    % Monocytes 6.3 %    % Eosinophils 0.6 %    % Basophils 0.3 %    % Immature Granulocytes 0.6 %    Nucleated RBCs 0 0 /100    Absolute Neutrophil 5.7 1.6 - 8.3 10e9/L    Absolute Lymphocytes 0.7 (L) 0.8 - 5.3 10e9/L    Absolute Monocytes 0.4 0.0 - 1.3 10e9/L    Absolute Eosinophils 0.0 0.0 - 0.7 10e9/L    Absolute Basophils 0.0 0.0 - 0.2 10e9/L    Abs Immature Granulocytes 0.0 0 - 0.4 10e9/L    Absolute Nucleated RBC 0.0

## 2019-09-20 NOTE — NURSING NOTE
Chief Complaint   Patient presents with     Oncology Clinic Visit     Returnl Small Cell Lung Ca     Blood Draw     labs drawn via piv by RN in lab. VS taken.      Labs drawn via peripheral IV. Vital signs taken. Checked into next appointment.   Cristy Trejo RN

## 2019-09-20 NOTE — PROGRESS NOTES
[facility]  [facility] CANCER CLINIC    FOLLOW-UP VISIT NOTE    PATIENT NAME: Amberly Palafox MRN # [id number]  DATE OF VISIT: September 20, 2019 YOB: 1945    CANCER TYPE: High grade neuroendocrine carcinoma  STAGE: Extensive  ECOG PS: 1    Cancer Staging  Small cell lung cancer (H)  Staging form: Lung, AJCC 8th Edition  - Clinical stage from 6/19/2019: Stage IV (cT2a, cN3, pM1c) - Signed by Candy Stubbs MD on 6/19/2019    PD-L1:  NGS:    SUMMARY  9/25/18 LDCT for screening through PCP. 3.5 x 2.9 cm ART mass  12/11/18 PET/CT. 3.3 x 3.3 cm ART mass (SUV 15.4), lobular component extending anteriorly and medially, L hilar HEATHER, bilateral axillary HEATHER; 1.8 x 1.1 cm R axillary node (SUV 2.1), 1.9 x 0.9 cm potential LN vs soft tissue nodule immediately adjacent to and anterior to R first rib and clavicle (SUV 3.1). 1 cm L periarotic and a few other small rounded retroperitoneal LN  5/29/19 CT chest w/o contrast. Mucoid impactions vs endobronchial tumor leading to ART mass, 4.4 x 3.8 cm ART mass, 0.3 cm ART nodule, 0.4, 0.3, 0.4 cm ART nodules, new since previous, 1.7 x 1.3 cm R axillary LN, 1.9 x 1.4 cm L axillary LN, additional mildly prominent bilateral axillary, subpectoral and supraclav LN, mediastinal adenopathy.   6/7/19 EBUS/bronch (Dr. Us). No endobronchial lesions. Radial EBUS transbronchial bx of ART mass  6/14/19 Brain MRI negative for mets  6/17/19 PET/CT. 5.1 x 4.4 cm ART mass (SUV 20.3), few small satellite nodules new since Dec, stable since 5/29. New LLL subpleural nodule, RLL nodule, bilateral axillary HEATHER; 2.2 x 1.3 cm L axillary node (SUV 3.7), 2.0 x 1.3 cm R axillary LN (SUV 4.1), additional hypermetabolic axillary nodes, 1.3 x 0.8 cm L peribronchial LN (SUV 4.5), L hilar adenopathy, new 0.4 cm hypodensity R liver, stable L periaortic LN.  6/20~8/29/19     DENZEL Schmidt returns today for follow up of SCLC after 4 cycles of carbo etoposide atezo. Doing  fine. Even still has her hair. No new sxs. Energy good, appetite excellent. No shortness of breath, HA, vision changes, N/V, cough, chest pain/pressure, abdominal pain, constipation, diarrhea, numbness/tingling, bleeding.     Going to [facility] tomorrow. Not sure whether she'll come back to [place] or not.     Still smoking. Hasn't contacted Daysi Mansfield to discuss cessation yet.    PAST MEDICAL HISTORY  SCLC as above  COPD. PFT 2013 FEV1 1.08 (48%), FVC 2.02 (69%), DLCOcor 12.9 (63%)  H/o Hashimoto's thyroiditis, now hypothyroidism  Osteopenia  Dyslipidemia  Vitamin D deficiency    CURRENT OUTPATIENT MEDICATIONS  Current Outpatient Medications   Medication Sig Dispense Refill     albuterol (PROAIR HFA/PROVENTIL HFA/VENTOLIN HFA) 108 (90 Base) MCG/ACT inhaler Inhale 2 puffs into the lungs every 4 hours as needed for shortness of breath / dyspnea 3 Inhaler 3     atorvastatin (LIPITOR) 20 MG tablet   3     B Complex-C (SUPER B COMPLEX PO) Take 1 tablet by mouth daily       cholecalciferol (VITAMIN D3) 5000 units CAPS capsule Take by mouth daily       FISH OIL        levothyroxine (SYNTHROID/LEVOTHROID) 150 MCG tablet Take 1 tablet (150 mcg) by mouth daily 90 tablet 1     Multiple Vitamins-Minerals (MULTIVITAMIN OR) Take  by mouth.       prochlorperazine (COMPAZINE) 10 MG tablet Take 1 tablet (10 mg) by mouth every 6 hours as needed (Nausea/Vomiting) 30 tablet 11     tiotropium (SPIRIVA RESPIMAT) 2.5 MCG/ACT inhaler Inhale 2 puffs into the lungs daily 12 g 0     varenicline (CHANTIX CONTINUING MONTH RANDAL) 1 MG tablet Take 1 tablet (1 mg) by mouth 2 times daily 60 tablet 1     ondansetron (ZOFRAN) 8 MG tablet Take 1 tablet (8 mg) by mouth every 8 hours as needed for nausea (Patient not taking: Reported on 9/20/2019) 30 tablet 11     ALLERGIES  No Known Allergies    REVIEW OF SYSTEMS  As above in the HPI, o/w complete 12-point ROS was negative.    PHYSICAL EXAM  /67   Pulse 100   Temp 98.5  F (36.9  C) (Oral)   Resp 16    Wt 53.2 kg (117 lb 3.2 oz)   SpO2 97%   BMI 21.44 kg/m    Wt Readings from Last 3 Encounters:   08/30/19 53.1 kg (117 lb)   08/27/19 53.4 kg (117 lb 11.2 oz)   08/06/19 53.9 kg (118 lb 12.8 oz)     GEN: NAD  HEENT: EOMI, no icterus, injection or pallor  LUNGS: clear bilaterally  CV: regular, no murmurs, rubs, or gallops  ABDOMEN: soft, non-tender, non-distended  EXT: warm, no edema  NEURO: alert  SKIN: no rashes    LABORATORY AND IMAGING STUDIES  Results for BRENT ALEJANDRE (MRN [id number]) as of 9/20/2019 13:58   9/20/2019 12:45   Sodium 135   Potassium 3.7   Chloride 102   Carbon Dioxide 28   Urea Nitrogen 17   Creatinine 0.50 (L)   GFR Estimate >90   GFR Estimate If Black >90   Calcium 8.8   Anion Gap 5   Albumin 3.9   Protein Total 8.7   Bilirubin Total 0.3   Alkaline Phosphatase 92   ALT 15   AST 14   TSH 3.63   Glucose 114 (H)   WBC 7.0   Hemoglobin 10.6 (L)   Hematocrit 32.3 (L)   Platelet Count 272   RBC Count 3.13 (L)    (H)   MCH 33.9 (H)   MCHC 32.8   RDW 18.4 (H)   Diff Method Automated Method   % Neutrophils 82.1   % Lymphocytes 10.1   % Monocytes 6.3   % Eosinophils 0.6   % Basophils 0.3   % Immature Granulocytes 0.6   Nucleated RBCs 0   Absolute Neutrophil 5.7   Absolute Lymphocytes 0.7 (L)   Absolute Monocytes 0.4   Absolute Eosinophils 0.0   Absolute Basophils 0.0   Abs Immature Granulocytes 0.0   Absolute Nucleated RBC 0.0     Results for orders placed or performed in visit on 09/19/19   CT Chest/Abdomen/Pelvis w Contrast    Narrative    CT CHEST, ABDOMEN AND PELVIS WITH CONTRAST  9/19/2019 11:04 AM    HISTORY: Restage SCLC, after 4 cycles of chemotherapy + atezolizumab  (immunotherapy). Small cell lung cancer (H)    TECHNIQUE: CT scan obtained of the chest, abdomen, and pelvis with  oral and IV contrast. Isovue 370, 72 mL IV injected. Radiation dose  for this scan was reduced using automated exposure control, adjustment  of the mA and/or kV according to patient size, or  iterative  reconstruction technique.    COMPARISON: 8/2/2019 CT chest, abdomen and pelvis    FINDINGS:  Chest: Redemonstrated mild bilateral axillary adenopathy without  significant interval change. Representative axillary node on the right  1.6 x 1.3 cm previously 1.8 x 1.3 cm. No mediastinal or hilar  adenopathy. Stable appearing 1.0 cm precarinal node on series 4 image  72, no adenopathy confirmed in this location on coronal image.    Left upper lobe mass in the posterior left upper lobe redemonstrated  3.0 x 2.4 cm previously 3.3 x 2.7 cm when measured at the same level.  This is measured on series 4 image 50 and appears slightly decreased  in size. Slightly irregular margins. Craniocaudal dimension 2.8,  previously 3.1 cm. Redemonstrated 2 areas of focal fissural thickening  in the right mid lung series 8 image 62 both 0.6 x 0.2 cm in size.  Stable tiny elongated left upper lobe nodular opacity series 4 image  47. There are emphysematous changes. No pleural effusion. Small amount  of material in the dependent trachea series 4 image 55 and 58 is  indeterminate, could be mucous.    Abdomen and pelvis: Stable subcentimeter low dense lesion at the  posterior dome of the liver with ill-defined margins series 2 image 54  too small to characterize. Liver otherwise appears normal.  Normal-appearing gallbladder, spleen, pancreas, and adrenal glands.  Stable subcentimeter low dense upper right kidney lesions which are  too small for definitive characterization but statistically consistent  with small cysts. There is extensive atherosclerotic vascular  calcification and plaque throughout the aorta and iliac arteries. No  periaortic or pelvic adenopathy.    Apparent gastric wall thickening diffusely could be due to under  distention particularly as this was slightly more distended on  8/2/2019. No acute appearing bowel abnormality or aggressive bone  lesions.      Impression    IMPRESSION:  1. Very slight decrease in size  of left upper lobe mass consistent  with the patient's known small cell lung cancer since 8/2/2018  currently 3.0 x 2.4 x 2.8 cm in size.  2. Stable tiny left upper lobe elongated nodule 0.3 x 0.2 cm on series  4 image 46.  3. No significant change in mild bilateral axillary adenopathy.  4. Thickened appearance of gastric wall identified. Nonspecific but  may be related to decompression/contraction since the gastric wall did  not appear thickened on 8/2/2019.  5. Indeterminate subcentimeter low dense lesion posterior superior  right lobe liver. Not seen on 7/23/2013 chest CT. Attention to this  area on follow-up scans. Possibly was present but not hypermetabolic  on 12/11/2018. Very difficult to visualize on PET CT.    JOVANNA GLEASON MD     Imaging was personally reviewed     ASSESSMENT AND PLAN  SCLC: Good RI to 4 cycles of carboplatin etoposide atezolizumab. Continue maintenance atezo. Restage after 3 cycles. Discussed that there's no role right now for consolidative chest radiotherapy or PCI. Moving to [facility] tomorrow. Not sure she'll return to [place]. Of course, I'm around to help in any way I can. She's also not sure whether she'll consult with the lung cancer team at [facility] in [place]; she'd like to get settled and then figure that out. There is an oncology clinic close to where she'll be. I encouraged her to make an appointment quickly. Will not schedule follow up. Asked her to call if she returns to [place].    Tobacco dependence: Encouraged cessation and to look for a program in [facility].    Anxiety: Doing ok.     Mild hyperthyroidism: Has H/o Hashimoto's and is on levothyroxine. TFTs have been ok after T4 was mildly elevated in May and June. Was 1.41 on 8/27; TSH was 3.63.     COPD: Doing fine.    Social: Moving vs visiting [facility] starting tomorrow.    A total of 40 minutes was spent with the patient, >50% of which was spent in counseling and coordination of care.    Candy Stubbs MD  Assistant    Hematology, Oncology and Transplantation

## 2019-09-26 NOTE — PROGRESS NOTES
Received VM from Roxana  She cannot go to [facility] as they will not accept her insurance.    She is considering two local Cancer clinics- one in [place] or the other is in [place].    Roxana also states that she may just need to return to [facility] for her next infusion due on 10/11/19.    Message sent to scheduling to book this appt.   Requested appt.  Requested Roxana return call to us when she makes an appt with local Oncologist.

## 2019-10-08 NOTE — PROGRESS NOTES
Left message for Roxana- asking her to confirm that [place].    Requested return call.

## 2019-10-16 NOTE — PROGRESS NOTES
Reason for Visit: seen in f/u of high grade neuroendocrine carcinoma     Oncology HPI:   9/25/18              LDCT for screening through PCP. 3.5 x 2.9 cm ART mass  12/11/18            PET/CT. 3.3 x 3.3 cm ART mass (SUV 15.4), lobular component extending anteriorly and medially, L hilar HEATHER, bilateral axillary HEATHER; 1.8 x 1.1 cm R axillary node (SUV 2.1), 1.9 x 0.9 cm potential LN vs soft tissue nodule immediately adjacent to and anterior to R first rib and clavicle (SUV 3.1). 1 cm L periarotic and a few other small rounded retroperitoneal LN  5/29/19              CT chest w/o contrast. Mucoid impactions vs endobronchial tumor leading to ART mass, 4.4 x 3.8 cm ART mass, 0.3 cm ART nodule, 0.4, 0.3, 0.4 cm ART nodules, new since previous, 1.7 x 1.3 cm R axillary LN, 1.9 x 1.4 cm L axillary LN, additional mildly prominent bilateral axillary, subpectoral and supraclav LN, mediastinal adenopathy.   6/7/19                EBUS/bronch (Dr. Us). No endobronchial lesions. Radial EBUS transbronchial bx of ART mass  6/14/19              Brain MRI negative for mets  6/17/19              PET/CT. 5.1 x 4.4 cm ART mass (SUV 20.3), few small satellite nodules new since Dec, stable since 5/29. New LLL subpleural nodule, RLL nodule, bilateral axillary HEATHER; 2.2 x 1.3 cm L axillary node (SUV 3.7), 2.0 x 1.3 cm R axillary LN (SUV 4.1), additional hypermetabolic axillary nodes, 1.3 x 0.8 cm L peribronchial LN (SUV 4.5), L hilar adenopathy, new 0.4 cm hypodensity R liver, stable L periaortic LN.  6/20/19-8/27/19:  Carbo/etoposide/atezolizumab x 4 cycles  9/20/19-current maintenance atezolizumab    Interval history: Roxana has decided to stay in [place] for the time being. Had planned to move to [place], but had anxiety about establishing with a new provider. In general, has been feeling well. Has some pain under the ribs for the past week. Noted it when reaching above her head and pushing boxes. Felt she pulled something. Pain under  the R rib is decreasing, still a little tender on the L. No pain with inspiration or shortness of breath. Has noted some wheezing/coughing that are a little more noticeable in the AM. She uses spiriva and albuterol and symptoms resolve for the rest of the day. She continues to smoke 1-2 cigarettes/day. Has been on chantix and finds that helpful. Bowel/bladder function wnl. No N/V. No abdominal pain. No rashes, bleeding or bruising. Denies new arthritic pains. No fevers/chills.    Current Outpatient Medications   Medication Sig Dispense Refill     albuterol (PROAIR HFA/PROVENTIL HFA/VENTOLIN HFA) 108 (90 Base) MCG/ACT inhaler Inhale 2 puffs into the lungs every 4 hours as needed for shortness of breath / dyspnea 3 Inhaler 3     atorvastatin (LIPITOR) 20 MG tablet   3     B Complex-C (SUPER B COMPLEX PO) Take 1 tablet by mouth daily       cholecalciferol (VITAMIN D3) 5000 units CAPS capsule Take by mouth daily       FISH OIL        levothyroxine (SYNTHROID/LEVOTHROID) 150 MCG tablet Take 1 tablet (150 mcg) by mouth daily 90 tablet 1     Multiple Vitamins-Minerals (MULTIVITAMIN OR) Take  by mouth.       ondansetron (ZOFRAN) 8 MG tablet Take 1 tablet (8 mg) by mouth every 8 hours as needed for nausea 30 tablet 11     prochlorperazine (COMPAZINE) 10 MG tablet Take 1 tablet (10 mg) by mouth every 6 hours as needed (Nausea/Vomiting) 30 tablet 11     tiotropium (SPIRIVA RESPIMAT) 2.5 MCG/ACT inhaler Inhale 2 puffs into the lungs daily 12 g 0     varenicline (CHANTIX CONTINUING MONTH RANDAL) 1 MG tablet Take 1 tablet (1 mg) by mouth 2 times daily 60 tablet 1        No Known Allergies      Exam: alert, appears well. Blood pressure (!) 150/69, pulse 87, temperature 97.8  F (36.6  C), temperature source Oral, resp. rate 16, weight 51.8 kg (114 lb 1.6 oz), SpO2 98 %, not currently breastfeeding.  Wt Readings from Last 4 Encounters:   10/17/19 51.8 kg (114 lb 1.6 oz)   09/20/19 53.2 kg (117 lb 3.2 oz)   08/30/19 53.1 kg (117 lb)    08/27/19 53.4 kg (117 lb 11.2 oz)   Oropharynx is moist and without lesion. No icterus or conjunctival injection.  Neck supple and without adenopathy. Lungs: coarse sounds in the lung base, improves with cough, no wheezing or crackles. Heart: RRR, no murmur or rub. Abdomen: soft, nontender, BS active, no masses or organomegaly.  Extremities: warm, no edema. Speech is clear. CN wnl. Gait/station wnl. Skin: no rashes, or bruising on exposed skin     Labs: Results for BRENT ALEJANDRE (MRN [id number]) as of 10/17/2019 17:31   Ref. Range 10/17/2019 09:53   Sodium Latest Ref Range: 133 - 144 mmol/L 137   Potassium Latest Ref Range: 3.4 - 5.3 mmol/L 3.9   Chloride Latest Ref Range: 94 - 109 mmol/L 104   Carbon Dioxide Latest Ref Range: 20 - 32 mmol/L 28   Urea Nitrogen Latest Ref Range: 7 - 30 mg/dL 19   Creatinine Latest Ref Range: 0.52 - 1.04 mg/dL 0.51 (L)   GFR Estimate Latest Ref Range: >60 mL/min/1.73_m2 >90   GFR Estimate If Black Latest Ref Range: >60 mL/min/1.73_m2 >90   Calcium Latest Ref Range: 8.5 - 10.1 mg/dL 9.2   Anion Gap Latest Ref Range: 3 - 14 mmol/L 6   Albumin Latest Ref Range: 3.4 - 5.0 g/dL 4.0   Protein Total Latest Ref Range: 6.8 - 8.8 g/dL 8.8   Bilirubin Total Latest Ref Range: 0.2 - 1.3 mg/dL 0.4   Alkaline Phosphatase Latest Ref Range: 40 - 150 U/L 76   ALT Latest Ref Range: 0 - 50 U/L 17   AST Latest Ref Range: 0 - 45 U/L 15   TSH Latest Ref Range: 0.40 - 4.00 mU/L 3.70   Glucose Latest Ref Range: 70 - 99 mg/dL 87   WBC Latest Ref Range: 4.0 - 11.0 10e9/L 5.0   Hemoglobin Latest Ref Range: 11.7 - 15.7 g/dL 11.5 (L)   Hematocrit Latest Ref Range: 35.0 - 47.0 % 35.7   Platelet Count Latest Ref Range: 150 - 450 10e9/L 277   RBC Count Latest Ref Range: 3.8 - 5.2 10e12/L 3.49 (L)   MCV Latest Ref Range: 78 - 100 fl 102 (H)   MCH Latest Ref Range: 26.5 - 33.0 pg 33.0   MCHC Latest Ref Range: 31.5 - 36.5 g/dL 32.2   RDW Latest Ref Range: 10.0 - 15.0 % 13.2   Diff Method Unknown Automated Method    % Neutrophils Latest Units: % 80.6   % Lymphocytes Latest Units: % 11.1   % Monocytes Latest Units: % 6.3   % Eosinophils Latest Units: % 1.4   % Basophils Latest Units: % 0.2   % Immature Granulocytes Latest Units: % 0.4   Nucleated RBCs Latest Ref Range: 0 /100 0   Absolute Neutrophil Latest Ref Range: 1.6 - 8.3 10e9/L 4.0   Absolute Lymphocytes Latest Ref Range: 0.8 - 5.3 10e9/L 0.6 (L)   Absolute Monocytes Latest Ref Range: 0.0 - 1.3 10e9/L 0.3   Absolute Eosinophils Latest Ref Range: 0.0 - 0.7 10e9/L 0.1   Absolute Basophils Latest Ref Range: 0.0 - 0.2 10e9/L 0.0   Abs Immature Granulocytes Latest Ref Range: 0 - 0.4 10e9/L 0.0   Absolute Nucleated RBC Unknown 0.0       Imaging: n/a    Impression/plan:   1. Metastatic high grade neuroendocrine tumor  -had a good HI to 4 cycles of Carbo/Etoposide/Atezolizumab  -initiated maintenance atezolizumabb on 9/20/19  -tolerated well. Is not having any significant adverse side effects  -she had planned to transition care to [place] but feels more comfortable maintaining her oncology care here. Asks to see if her infusions could be every couple of months rather than every 3 weeks. Advised her to maintain this schedule, risk of progression with altered schedule. She was amenable  -will set up in 3 weeks for atezolizumab, does not need a provider visit at that time  -will set up 6 week apt with Dr. Stubbs with restaging CT CAP and labs prior to atezolizumab infusions     2. Tobacco abuse, continues to smoke.   -renewed her Chantix today. Advised her to be intentional about stopping before the end of the new prescription. Discussed avoidance strategies.     4. Hypothyroidism, h/o Hashimoto's,  -on levothyroxine 150 mcg.  Thyroid function wnl. Monitored prior to each infusion of atezolizumab.

## 2019-10-17 NOTE — LETTER
10/17/2019       RE: Amberly Palafox  [address] [place] [address]     Dear Colleague,    Thank you for referring your patient, Amberly Palafox, to the [facility]. Please see a copy of my visit note below.    Reason for Visit: seen in f/u of high grade neuroendocrine carcinoma     Oncology HPI:   9/25/18              LDCT for screening through PCP. 3.5 x 2.9 cm ART mass  12/11/18            PET/CT. 3.3 x 3.3 cm ART mass (SUV 15.4), lobular component extending anteriorly and medially, L hilar HEATHER, bilateral axillary HEATHER; 1.8 x 1.1 cm R axillary node (SUV 2.1), 1.9 x 0.9 cm potential LN vs soft tissue nodule immediately adjacent to and anterior to R first rib and clavicle (SUV 3.1). 1 cm L periarotic and a few other small rounded retroperitoneal LN  5/29/19              CT chest w/o contrast. Mucoid impactions vs endobronchial tumor leading to ART mass, 4.4 x 3.8 cm ART mass, 0.3 cm ART nodule, 0.4, 0.3, 0.4 cm ART nodules, new since previous, 1.7 x 1.3 cm R axillary LN, 1.9 x 1.4 cm L axillary LN, additional mildly prominent bilateral axillary, subpectoral and supraclav LN, mediastinal adenopathy.   6/7/19                EBUS/bronch (Dr. Us). No endobronchial lesions. Radial EBUS transbronchial bx of ART mass  6/14/19              Brain MRI negative for mets  6/17/19              PET/CT. 5.1 x 4.4 cm ART mass (SUV 20.3), few small satellite nodules new since Dec, stable since 5/29. New LLL subpleural nodule, RLL nodule, bilateral axillary HEATHER; 2.2 x 1.3 cm L axillary node (SUV 3.7), 2.0 x 1.3 cm R axillary LN (SUV 4.1), additional hypermetabolic axillary nodes, 1.3 x 0.8 cm L peribronchial LN (SUV 4.5), L hilar adenopathy, new 0.4 cm hypodensity R liver, stable L periaortic LN.  6/20/19-8/27/19:  Carbo/etoposide/atezolizumab  x 4 cycles  9/20/19-current maintenance atezolizumab    Interval history: Roxana has decided to stay in [place] for the time being. Had planned to move to  [place], but had anxiety about establishing with a new provider. In general, has been feeling well. Has some pain under the ribs for the past week. Noted it when reaching above her head and pushing boxes. Felt she pulled something. Pain under the R rib is decreasing, still a little tender on the L. No pain with inspiration or shortness of breath. Has noted some wheezing/coughing that are a little more noticeable in the AM. She uses spiriva and albuterol and symptoms resolve for the rest of the day. She continues to smoke 1-2 cigarettes/day. Has been on chantix and finds that helpful. Bowel/bladder function wnl. No N/V. No abdominal pain. No rashes, bleeding or bruising. Denies new arthritic pains. No fevers/chills.    Current Outpatient Medications   Medication Sig Dispense Refill     albuterol (PROAIR HFA/PROVENTIL HFA/VENTOLIN HFA) 108 (90 Base) MCG/ACT inhaler Inhale 2 puffs into the lungs every 4 hours as needed for shortness of breath / dyspnea 3 Inhaler 3     atorvastatin (LIPITOR) 20 MG tablet   3     B Complex-C (SUPER B COMPLEX PO) Take 1 tablet by mouth daily       cholecalciferol (VITAMIN D3) 5000 units CAPS capsule Take by mouth daily       FISH OIL        levothyroxine (SYNTHROID/LEVOTHROID) 150 MCG tablet Take 1 tablet (150 mcg) by mouth daily 90 tablet 1     Multiple Vitamins-Minerals (MULTIVITAMIN OR) Take  by mouth.       ondansetron (ZOFRAN) 8 MG tablet Take 1 tablet (8 mg) by mouth every 8 hours as needed for nausea 30 tablet 11     prochlorperazine (COMPAZINE) 10 MG tablet Take 1 tablet (10 mg) by mouth every 6 hours as needed (Nausea/Vomiting) 30 tablet 11     tiotropium (SPIRIVA RESPIMAT) 2.5 MCG/ACT inhaler Inhale 2 puffs into the lungs daily 12 g 0     varenicline (CHANTIX CONTINUING MONTH RANDAL) 1 MG tablet Take 1 tablet (1 mg) by mouth 2 times daily 60 tablet 1        No Known Allergies      Exam: alert, appears well. Blood pressure (!) 150/69, pulse 87, temperature 97.8  F (36.6  C),  temperature source Oral, resp. rate 16, weight 51.8 kg (114 lb 1.6 oz), SpO2 98 %, not currently breastfeeding.  Wt Readings from Last 4 Encounters:   10/17/19 51.8 kg (114 lb 1.6 oz)   09/20/19 53.2 kg (117 lb 3.2 oz)   08/30/19 53.1 kg (117 lb)   08/27/19 53.4 kg (117 lb 11.2 oz)   Oropharynx is moist and without lesion. No icterus or conjunctival injection.  Neck supple and without adenopathy. Lungs: coarse sounds in the lung base, improves with cough, no wheezing or crackles. Heart: RRR, no murmur or rub. Abdomen: soft, nontender, BS active, no masses or organomegaly.  Extremities: warm, no edema. Speech is clear. CN wnl. Gait/station wnl. Skin: no rashes, or bruising on exposed skin     Labs: Results for BRENT ALEJANDRE (MRN [id number]) as of 10/17/2019 17:31   Ref. Range 10/17/2019 09:53   Sodium Latest Ref Range: 133 - 144 mmol/L 137   Potassium Latest Ref Range: 3.4 - 5.3 mmol/L 3.9   Chloride Latest Ref Range: 94 - 109 mmol/L 104   Carbon Dioxide Latest Ref Range: 20 - 32 mmol/L 28   Urea Nitrogen Latest Ref Range: 7 - 30 mg/dL 19   Creatinine Latest Ref Range: 0.52 - 1.04 mg/dL 0.51 (L)   GFR Estimate Latest Ref Range: >60 mL/min/1.73_m2 >90   GFR Estimate If Black Latest Ref Range: >60 mL/min/1.73_m2 >90   Calcium Latest Ref Range: 8.5 - 10.1 mg/dL 9.2   Anion Gap Latest Ref Range: 3 - 14 mmol/L 6   Albumin Latest Ref Range: 3.4 - 5.0 g/dL 4.0   Protein Total Latest Ref Range: 6.8 - 8.8 g/dL 8.8   Bilirubin Total Latest Ref Range: 0.2 - 1.3 mg/dL 0.4   Alkaline Phosphatase Latest Ref Range: 40 - 150 U/L 76   ALT Latest Ref Range: 0 - 50 U/L 17   AST Latest Ref Range: 0 - 45 U/L 15   TSH Latest Ref Range: 0.40 - 4.00 mU/L 3.70   Glucose Latest Ref Range: 70 - 99 mg/dL 87   WBC Latest Ref Range: 4.0 - 11.0 10e9/L 5.0   Hemoglobin Latest Ref Range: 11.7 - 15.7 g/dL 11.5 (L)   Hematocrit Latest Ref Range: 35.0 - 47.0 % 35.7   Platelet Count Latest Ref Range: 150 - 450 10e9/L 277   RBC Count Latest Ref  Range: 3.8 - 5.2 10e12/L 3.49 (L)   MCV Latest Ref Range: 78 - 100 fl 102 (H)   MCH Latest Ref Range: 26.5 - 33.0 pg 33.0   MCHC Latest Ref Range: 31.5 - 36.5 g/dL 32.2   RDW Latest Ref Range: 10.0 - 15.0 % 13.2   Diff Method Unknown Automated Method   % Neutrophils Latest Units: % 80.6   % Lymphocytes Latest Units: % 11.1   % Monocytes Latest Units: % 6.3   % Eosinophils Latest Units: % 1.4   % Basophils Latest Units: % 0.2   % Immature Granulocytes Latest Units: % 0.4   Nucleated RBCs Latest Ref Range: 0 /100 0   Absolute Neutrophil Latest Ref Range: 1.6 - 8.3 10e9/L 4.0   Absolute Lymphocytes Latest Ref Range: 0.8 - 5.3 10e9/L 0.6 (L)   Absolute Monocytes Latest Ref Range: 0.0 - 1.3 10e9/L 0.3   Absolute Eosinophils Latest Ref Range: 0.0 - 0.7 10e9/L 0.1   Absolute Basophils Latest Ref Range: 0.0 - 0.2 10e9/L 0.0   Abs Immature Granulocytes Latest Ref Range: 0 - 0.4 10e9/L 0.0   Absolute Nucleated RBC Unknown 0.0       Imaging: n/a    Impression/plan:   1. Metastatic high grade neuroendocrine tumor  -had a good LA to 4 cycles of Carbo/Etoposide/Atezolizumab  -initiated maintenance atezolizumabb on 9/20/19  -tolerated well. Is not having any significant adverse side effects  -she had planned to transition care to [place] but feels more comfortable maintaining her oncology care here. Asks to see if her infusions could be every couple of months rather than every 3 weeks. Advised her to maintain this schedule, risk of progression with altered schedule. She was amenable  -will set up in 3 weeks for atezolizumab, does not need a provider visit at that time  -will set up 6 week apt with Dr. Stubbs with restaging CT CAP and labs prior to atezolizumab infusions     2. Tobacco abuse, continues to smoke.   -renewed her Chantix today. Advised her to be intentional about stopping before the end of the new prescription. Discussed avoidance strategies.     4. Hypothyroidism, h/o Hashimoto's,  -on levothyroxine 150 mcg.   Thyroid function wnl. Monitored prior to each infusion of atezolizumab.       Again, thank you for allowing me to participate in the care of your patient.      Sincerely,    CARLY Bhatt CNP

## 2019-10-17 NOTE — PROGRESS NOTES
Infusion Nursing Note:  Amberly Palafox presents today for Cycle 6 Tecentriq.    Patient seen by provider today: Yes: Norma Crespo DNP    Intravenous Access:  Peripheral IV placed.    Treatment Conditions:  Lab Results   Component Value Date    HGB 11.5 10/17/2019     Lab Results   Component Value Date    WBC 5.0 10/17/2019      Lab Results   Component Value Date    ANEU 4.0 10/17/2019     Lab Results   Component Value Date     10/17/2019      Lab Results   Component Value Date     10/17/2019                   Lab Results   Component Value Date    POTASSIUM 3.9 10/17/2019           Lab Results   Component Value Date    MAG 2.2 05/17/2019            Lab Results   Component Value Date    CR 0.51 10/17/2019                   Lab Results   Component Value Date    JASPAL 9.2 10/17/2019                Lab Results   Component Value Date    BILITOTAL 0.4 10/17/2019           Lab Results   Component Value Date    ALBUMIN 4.0 10/17/2019                    Lab Results   Component Value Date    ALT 17 10/17/2019           Lab Results   Component Value Date    AST 15 10/17/2019     Results reviewed, labs MET treatment parameters, ok to proceed with treatment.    Post Infusion Assessment:  Patient tolerated infusion without incident.  Blood return noted pre and post infusion.  Site patent and intact, free from redness, edema or discomfort.  No evidence of extravasations.  Access discontinued per protocol.       Discharge Plan:   Patient declined prescription refills.  Copy of AVS reviewed with patient and/or family.  Patient will return 11/7 for next appointment.  Patient discharged in stable condition accompanied by: self.  Departure Mode: Ambulatory.    Daysi Lockett RN

## 2019-10-17 NOTE — NURSING NOTE
"Oncology Rooming Note    October 17, 2019 10:07 AM   Amberly Palafox is a 74 year old female who presents for:    Chief Complaint   Patient presents with     Blood Draw     Labs drawn via PIV by RN in lab. VS taken.      Oncology Clinic Visit     Return - Small Cell Lung Ca     Initial Vitals: BP (!) 150/69   Pulse 87   Temp 97.8  F (36.6  C) (Oral)   Resp 16   Wt 51.8 kg (114 lb 1.6 oz)   SpO2 98%   BMI 20.87 kg/m   Estimated body mass index is 20.87 kg/m  as calculated from the following:    Height as of 8/30/19: 1.575 m (5' 2\").    Weight as of this encounter: 51.8 kg (114 lb 1.6 oz). Body surface area is 1.51 meters squared.  No Pain (0) Comment: Data Unavailable   No LMP recorded. Patient is postmenopausal.  Allergies reviewed: Yes  Medications reviewed: Yes    Medications: Medication refills not needed today.  Pharmacy name entered into [email or web address]:    [facility] [address] [place] [address] [address] [facility]  [facility] - [place], [place] - [address] [place] [address]    Clinical concerns: Lab Results and questions about bilateral dull ache of her abdomen.       Wayne Yanes, EMT              "

## 2019-10-17 NOTE — PATIENT INSTRUCTIONS
Contact Numbers    [facility] Main Line (for Scheduling/Triage/After Hours Nurse Line): [phone]    Please call the [facility] nurse triage or the after hours nurse line if you experience a temperature greater than or equal to 100.5, shaking chills, have uncontrolled nausea, vomiting and/or diarrhea, dizziness, lightheadedness, shortness of breath, chest pain, bleeding, unexplained bruising, or if you have any other new/concerning symptoms, questions or concerns.     If you are having any concerning symptoms or wish to speak to a provider before your next infusion visit, please call your care coordinator or triage to notify them so we can adequately serve you.     If you need a refill on a narcotic prescription or other medication, please call triage before your infusion appointment.       October 2019 Sunday Monday Tuesday Wednesday Thursday Friday Saturday             1     2     3     4     5       6     7     8     9     10     11     12       13     14     15     16     17    [facility] DRAW   9:45 AM   (15 min.)    [facility] D[facility]   [facility] Draw    [facility] RETURN  10:05 AM   (50 min.)   Norma Crespo, CARLY CNP   [facility]    [facility] ONC INFUSION 60  12:00 PM   (60 min.)    ONCOLOGY INFUSION   [facility] 18     19       20     21     22     23     24     25     26       27     28     29     30     31 November 2019 Sunday Monday Tuesday Wednesday Thursday Friday Saturday                            1     2       3     4     5     6     7    [facility] DRAW  12:30 PM   (15 min.)    [facility] DRAW   [facility] Draw    P ONC INFUSION 60   1:00 PM   (60 min.)    ONCOLOGY INFUSION   [facility] 8     9       10     11     12     13     14     15     16       17     18     19     20     21     22     23       24     25     26     27     28     29     30                     Lab  Results:  Recent Results (from the past 12 hour(s))   Comprehensive metabolic panel    Collection Time: 10/17/19  9:53 AM   Result Value Ref Range    Sodium 137 133 - 144 mmol/L    Potassium 3.9 3.4 - 5.3 mmol/L    Chloride 104 94 - 109 mmol/L    Carbon Dioxide 28 20 - 32 mmol/L    Anion Gap 6 3 - 14 mmol/L    Glucose 87 70 - 99 mg/dL    Urea Nitrogen 19 7 - 30 mg/dL    Creatinine 0.51 (L) 0.52 - 1.04 mg/dL    GFR Estimate >90 >60 mL/min/[1.73_m2]    GFR Estimate If Black >90 >60 mL/min/[1.73_m2]    Calcium 9.2 8.5 - 10.1 mg/dL    Bilirubin Total 0.4 0.2 - 1.3 mg/dL    Albumin 4.0 3.4 - 5.0 g/dL    Protein Total 8.8 6.8 - 8.8 g/dL    Alkaline Phosphatase 76 40 - 150 U/L    ALT 17 0 - 50 U/L    AST 15 0 - 45 U/L   TSH with free T4 reflex    Collection Time: 10/17/19  9:53 AM   Result Value Ref Range    TSH 3.70 0.40 - 4.00 mU/L   CBC with platelets differential    Collection Time: 10/17/19  9:53 AM   Result Value Ref Range    WBC 5.0 4.0 - 11.0 10e9/L    RBC Count 3.49 (L) 3.8 - 5.2 10e12/L    Hemoglobin 11.5 (L) 11.7 - 15.7 g/dL    Hematocrit 35.7 35.0 - 47.0 %     (H) 78 - 100 fl    MCH 33.0 26.5 - 33.0 pg    MCHC 32.2 31.5 - 36.5 g/dL    RDW 13.2 10.0 - 15.0 %    Platelet Count 277 150 - 450 10e9/L    Diff Method Automated Method     % Neutrophils 80.6 %    % Lymphocytes 11.1 %    % Monocytes 6.3 %    % Eosinophils 1.4 %    % Basophils 0.2 %    % Immature Granulocytes 0.4 %    Nucleated RBCs 0 0 /100    Absolute Neutrophil 4.0 1.6 - 8.3 10e9/L    Absolute Lymphocytes 0.6 (L) 0.8 - 5.3 10e9/L    Absolute Monocytes 0.3 0.0 - 1.3 10e9/L    Absolute Eosinophils 0.1 0.0 - 0.7 10e9/L    Absolute Basophils 0.0 0.0 - 0.2 10e9/L    Abs Immature Granulocytes 0.0 0 - 0.4 10e9/L    Absolute Nucleated RBC 0.0

## 2019-10-18 NOTE — TELEPHONE ENCOUNTER
"Requested Prescriptions   Pending Prescriptions Disp Refills     varenicline (CHANTIX STARTING MONTH PAK) 0.5 MG X 11 & 1 MG X 42 tablet [Pharmacy Med Name: CHANTIX 0.5& 1MG STARTER  PAK]  Last Written Prescription Date:  11/08/2013  Last Fill Quantity: 53 tablet,  # refills: 0   Last Office Visit: 6/5/2019 Josiane [place] MD   Future Office Visit:       CONTINUING PACK Renewed for PT  10/17/2019   53 tablet 0     Sig: TAKE 0.5 MG TAB DAILY FOR 3 DAYS ,THEN 0.5 MG TABLET TWICE DAILY FOR 4 DAYS, THEN 1 MG TWICE DAILY       Partial Cholinergic Nicotinic Agonist Agents Failed - 10/18/2019 12:12 PM        Failed - Blood pressure under 140/90 in past 12 months     BP Readings from Last 3 Encounters:   10/17/19 (!) 150/69   09/20/19 129/67   08/30/19 128/80             Passed - Recent (12 mo) or future (30 days) visit within the authorizing provider's specialty     Patient has had an office visit with the authorizing provider or a provider within the authorizing providers department within the previous 12 mos or has a future within next 30 days. See \"Patient Info\" tab in inbasket, or \"Choose Columns\" in Meds & Orders section of the refill encounter.              Passed - Medication is active on med list        Passed - Patient is 18 years of age or older        Passed - Patient is not pregnant        Passed - No positive pregnancy test on file in past 12 months     Routing refill request to provider for review/approval because:  Drug not active on patient's medication list         "

## 2019-11-04 NOTE — TELEPHONE ENCOUNTER
ONCOLOGY INTAKE: Records Information      APPT INFORMATION: 6/19/19 - [facility] - [facility]  Referring provider:  Luz Lopez  Referring provider s clinic:  [facility]  Reason for visit/diagnosis:  Small Cell Lung Cancer  Has patient been notified of appointment date and time?: Yes    RECORDS INFORMATION:  Were the records received with the referral (via [employer])? No (internal referral)    Has patient been seen for any external appt for this diagnosis? No    If yes, where? NA    Has patient had any imaging or procedures outside of [facility] for this condition? No      If Yes, where? NA    ADDITIONAL INFORMATION:  Scheduled via inbasket from Luz & Dr Stubbs    *Imaging appts:  6/14/19 at 6:30am  MRI  at   6/17/19 at 2:45pm  PET  at      abrasion and redness to forehead

## 2019-11-07 NOTE — NURSING NOTE
Chief Complaint   Patient presents with     Blood Draw     Labs drawn via PIV placed by RN in lab. VS taken. Patient checked in for next appt.     Labs drawn from PIV placed by vascular access RN. Line flushed with saline. Vitals taken. Pt checked in for appointment.    Aliya Atkins RN

## 2019-11-07 NOTE — PROGRESS NOTES
Infusion Nursing Note:  Amberly Palafox presents today for C7 Tecentriq.    Patient seen by provider today: No   present during visit today: Not Applicable.    Note: Pt feeling well today, no new complaints.    Intravenous Access:  Peripheral IV placed.    Treatment Conditions:  Lab Results   Component Value Date    HGB 10.9 11/07/2019     Lab Results   Component Value Date    WBC 4.2 11/07/2019      Lab Results   Component Value Date    ANEU 3.1 11/07/2019     Lab Results   Component Value Date     11/07/2019      Lab Results   Component Value Date     11/07/2019                   Lab Results   Component Value Date    POTASSIUM 3.6 11/07/2019           Lab Results   Component Value Date    MAG 2.2 05/17/2019            Lab Results   Component Value Date    CR 0.71 11/07/2019                   Lab Results   Component Value Date    JASPAL 8.7 11/07/2019                Lab Results   Component Value Date    BILITOTAL 0.3 11/07/2019           Lab Results   Component Value Date    ALBUMIN 3.6 11/07/2019                    Lab Results   Component Value Date    ALT 16 11/07/2019           Lab Results   Component Value Date    AST 14 11/07/2019       Results reviewed, labs MET treatment parameters, ok to proceed with treatment.      Post Infusion Assessment:  Patient tolerated infusion without incident.  Blood return noted pre and post infusion.  Site patent and intact, free from redness, edema or discomfort.  No evidence of extravasations.  Access discontinued per protocol.       Discharge Plan:   Patient declined prescription refills.  Discharge instructions reviewed with: Patient.  Patient and/or family verbalized understanding of discharge instructions and all questions answered.  Copy of AVS reviewed with patient and/or family.  Patient will return 12/4 for next appointment.  AVS to patient via [employer].  Patient will return 12/2 for CT and 12/4 for provider/infusion.   Patient discharged in  stable condition accompanied by: self.  Departure Mode: Ambulatory.    Jennifer Marie RN

## 2019-11-07 NOTE — PATIENT INSTRUCTIONS
Contact Numbers  [facility]: [phone]    After Hours:  [phone]  Triage: [phone]    Please call the [facility] Triage line if you experience a temperature greater than or equal to 100.5, shaking chills, have uncontrolled nausea, vomiting and/or diarrhea, dizziness, shortness of breath, chest pain, bleeding, unexplained bruising, or if you have any other new/concerning symptoms, questions or concerns.     If it is after hours, weekends, or holidays, please call the main hospital  at  [phone] and ask to speak to the Oncology doctor on call.     If you are having any concerning symptoms or wish to speak to a provider before your next infusion visit, please call your care coordinator or triage to notify them so we can adequately serve you.     If you need a refill on a narcotic prescription or other medication, please call triage before your infusion appointment.

## 2019-12-02 NOTE — TELEPHONE ENCOUNTER
"Requested Prescriptions   Pending Prescriptions Disp Refills     SPIRIVA RESPIMAT 2.5 MCG/ACT inhaler [Pharmacy Med Name: SPIRIVA RESPIMAT INH [id number] 2.5MCG] 12 g 4     Sig: USE 2 INHALATIONS DAILY       Asthma Maintenance Inhalers - Anticholinergics Passed - 12/2/2019  7:06 AM        Passed - Patient is age 12 years or older        Passed - Recent (12 mo) or future (30 days) visit within the authorizing provider's specialty     Patient has had an office visit with the authorizing provider or a provider within the authorizing providers department within the previous 12 mos or has a future within next 30 days. See \"Patient Info\" tab in inbasket, or \"Choose Columns\" in Meds & Orders section of the refill encounter.              Passed - Medication is active on med list        Last Written Prescription Date:  9/10/19  Last Fill Quantity: 12 g,  # refills: 0   Last office visit: 6/5/2019 with prescribing provider:  Josiane [place] MD   Future Office Visit:   Na    Prescription approved per [facility] Refill Protocol.  Irina Romano RN on 12/2/2019 at 12:14 PM    "

## 2019-12-03 NOTE — ED TRIAGE NOTES
Pt reports having cough for past 3 days. Pt denies congestion or fever. Pt has small cell lung cancer. Last chemo 11/17. Due  For ct and infusion today.

## 2019-12-03 NOTE — DISCHARGE INSTRUCTIONS
Please return to the ED if you have worsening cough, fevers >101, chest pain, shortness of breath, intractable vomiting, or other acute changes.      May use albuterol for cough every 4-6 hours as needed.      Drink plenty of fluids and rest.      See oncology as scheduled tomorrow.      Discharge Instructions  Bronchitis, Pneumonia, Bronchospasm    You were seen today for a chest infection or inflammation. If your provider decided this was due to a bacterial infection, you may need an antibiotic. Sometimes these are caused by a virus, and then an antibiotic will not help.     Generally, every Emergency Department visit should have a follow-up clinic visit with either a primary or a specialty clinic/provider. Please follow-up as instructed by your emergency provider today.    Return to the Emergency Department if:  Your breathing gets much worse.  You are very weak, or feel much more ill.  You develop new symptoms, such as chest pain.  You cough up blood.  You are vomiting (throwing up) enough that you cannot keep fluids or your medicine down.    What can I do to help myself?  Fill any prescriptions the provider gave you and take them right away--especially antibiotics. Be sure to finish the whole antibiotic prescription.  You may be given a prescription for an inhaler, which can help loosen tight air passages.  Use this as needed, but not more often than directed. Inhalers work much better when used with a spacer.   You may be given a prescription for a steroid to reduce inflammation. Used long-term, these can have side effects, but for short-term use they are safe. You may notice restlessness or increased appetite.      You may use non-prescription cough or cold medicines. Cough medicines may help, but don t make the cough go away completely.   Avoid smoke, because this can make your symptoms worse. If you smoke, this may be a good time to quit! Consider using nicotine lozenges, gum, or patches to reduce cravings.    If you have a fever, Tylenol  (acetaminophen), Motrin  (ibuprofen), or Advil  (ibuprofen) may help bring fever down and may help you feel more comfortable. Be sure to read and follow the package directions, and ask your provider if you have questions.  Be sure to get your flu shot each year.  For certain ages, the pneumonia shot can help prevent pneumonia.  If you were given a prescription for medicine here today, be sure to read all of the information (including the package insert) that comes with your prescription.  This will include important information about the medicine, its side effects, and any warnings that you need to know about.  The pharmacist who fills the prescription can provide more information and answer questions you may have about the medicine.  If you have questions or concerns that the pharmacist cannot address, please call or return to the Emergency Department.     Remember that you can always come back to the Emergency Department if you are not able to see your regular provider in the amount of time listed above, if you get any new symptoms, or if there is anything that worries you.

## 2019-12-03 NOTE — ED AVS SNAPSHOT
Emergency Department  [address] [place] [address]  Phone:  [phone]  Fax:  [phone]                                    Amberly Palafox   MRN: [id number]    Department:   Emergency Department   Date of Visit:  12/3/2019           After Visit Summary Signature Page    I have received my discharge instructions, and my questions have been answered. I have discussed any challenges I see with this plan with the nurse or doctor.    ..........................................................................................................................................  Patient/Patient Representative Signature      ..........................................................................................................................................  Patient Representative Print Name and Relationship to Patient    ..................................................               ................................................  Date                                   Time    ..........................................................................................................................................  Reviewed by Signature/Title    ...................................................              ..............................................  Date                                               Time          22EPIC Rev 08/18

## 2019-12-03 NOTE — ED PROVIDER NOTES
History     Chief Complaint:  Cough    The history is provided by the patient.      Amberly Palafox is a 74 year old female with a history of small cell lung cancer who presents with a cough. The patient reports a dry cough for the last 3 days. She reports she has been up all night coughing and not being able to sleep. The patient also have upper back pain. She has had some chills but denies any fever. The patient denies any nausea, vomiting, rashes, chest pain, leg swelling, and recent illness exposures. She reports that she has needed to sleep more sitting up lately. The patient has been having difficulty with breathing in the morning.     Allergies:  No known drug allergies.    Medications:    Albuterol  Euthyrox  Zofran  Compazine  Spiriva     Past Medical History:    Hypothyroidism  Small cell lung cancer  Chemotherapy-induced neutropenia  Lung nodule  COPD  Tobacco abuse  Osteopenia  Mixed hyperlipidemia    Past Surgical History:    History reviewed. No pertinent past surgical history.    Family History:    Thyroid disease  Heart disease  Hypertension  Lupus    Social History:  Patient is single  Tobacco Use: Yes  Alcohol Use: No  PCP: [facility]     Review of Systems   All other systems reviewed and are negative.    Physical Exam   First Vitals:  Patient Vitals for the past 24 hrs:   BP Temp Temp src Pulse Resp SpO2 Weight   12/03/19 1415 (!) 145/66 -- -- -- 18 100 % --   12/03/19 1400 -- -- -- -- -- 95 % --   12/03/19 1300 -- -- -- -- -- 94 % --   12/03/19 1128 (!) 163/51 99  F (37.2  C) Temporal 105 18 94 % 52.6 kg (116 lb)     Physical Exam   General: Resting on the bed.  Head: No obvious trauma to head.  Ears, Nose, Throat:  External ears normal.  Nose normal.    Eyes:  Conjunctivae clear.  Pupils are equal, round, and reactive.   Neck: Normal range of motion.  Neck supple.   CV: Regular rate and rhythm.  No murmurs.      Respiratory: Effort normal and breath sounds diminished.  No  retractions.  Wheezing diffusely.  No focal crackles.    Gastrointestinal: Soft.  No distension. There is no tenderness.   Musculoskeletal: Non tender non edematous calves  Neuro: Alert. Moving all extremities appropriately.  Normal speech.    Skin: Skin is warm and dry.  No rash noted.     Emergency Department Course   ECG:  @ 1222  Indication: SOB  Vent. Rate 86 bpm. IL interval 128 ms. QRS duration 96 ms. QT/QTc 388/464 ms. P-R-T axis 77 76 61.   Sinus rhythm with frequent premature ventricular complexes. Possible left atrial enlargement. Incomplete right bundle branch block. Borderline ECG.  No significant change when compared to previous ECG from 19   Read by Dr. Mccain.    Imaging:  Radiographic findings were communicated with the patient who voiced understanding of the findings.  CT chest (PE) abdomen pelvis w contrast:  1. No evidence of pulmonary embolism.  2. Left upper lobe nodule has decreased in size compared to the prior  study.  3. Bilateral axillary adenopathy is not changed.  4. No acute abnormality demonstrated in the abdomen or pelvis. Per radiology read.     Laboratory:  CBC:  WBC 3.8 low, HGB 11.3 low,   BMP: WNL. (Creatinine 0.59)  Troponin: <0.015  Magnesium: 2.0    Interventions:  1217: Duoneb 3mL Nebulization  1237: NS 1L IV  1331: Duoneb 3mL Nebulization  1343: Isovue 59mL IV  1458: Duoneb 3mL Nebulization     Emergency Department Course:  11:52 AM Nursing notes and vitals reviewed.  I performed an exam of the patient as documented above.     1:54 PM I rechecked on the patient.    2:46 PM I consulted with Dr. Cerda of [facility] regarding the patient.    3:21 PM Findings and plan explained to the patient. Patient discharged home with instructions regarding supportive care, medications, and reasons to return. The importance of close follow-up was reviewed.     Impression & Plan      Medical Decision Makin-year-old female with COPD and small cell lung cancer on  immunotherapy presents with cough.  Vital signs are reassuring.  Prior differential was pursued including not limited to pneumonia, pneumothorax, effusion, PE, acute coronary syndrome, electrolyte, metabolic, renal dysfunction, bronchitis, COPD exacerbation, dehydration, etc.  CBC shows mild leukopenia 3.8 and hemoglobin 11.3.  This is otherwise unremarkable.  She is afebrile therefore do not suspect sepsis.  Additionally she is not neutropenic.  BMP shows no acute electrolyte, metabolic, renal dysfunction.  Magnesium level normal.  EKG showed sinus rhythm, no acute ST-T wave changes.  No evidence of ischemia.  Troponin is negative.  Patient has no chest pain.  I have overall low suspicion for acute coronary syndrome.  Patient had some mild back pain but this resolved with breathing treatments.  This seems to be most consistent with likely COPD exacerbation.  CT chest abdomen pelvis is obtained.  CT shows no evidence of PE.  Left upper lobe nodule has decreased but axillary adenopathy is unchanged.  No other identifiable acute pathology.  No pneumonia, pneumothorax, effusion.  Patient was wheezy on exam.  She felt improved after the first neb but continued to be somewhat diminished.  After the second neb she had persistent wheezing.  She was given 3 nebs and felt better.  She not requiring supplemental oxygen.  She feels well enough to go home.  I discussed in brief with her oncologist to ensure that steroids and doxycycline were appropriate.  Oncology was agreeable with this plan.  Patient has follow-up tomorrow with her doctor and was encouraged to keep it.   Overall patient is well-appearing nontoxic.  She was discharged home with family.  Return precautions were discussed.  Follow-up was advised.    Diagnosis:    ICD-10-CM    1. Chronic obstructive pulmonary disease with acute exacerbation (H) J44.1 Nebulizer   2. Cough R05 Nebulizer       Disposition:  discharged to home    Discharge Medications:  New  Prescriptions    ALBUTEROL (PROVENTIL) (2.5 MG/3ML) 0.083% NEB SOLUTION    Take 1 vial (2.5 mg) by nebulization every 6 hours as needed for shortness of breath / dyspnea or wheezing    DOXYCYCLINE HYCLATE (VIBRAMYCIN) 100 MG CAPSULE    Take 1 capsule (100 mg) by mouth 2 times daily for 7 days    PREDNISONE (DELTASONE) 20 MG TABLET    Take two tablets (= 40mg) each day for 5 (five) days     I, Bradley Aasen, am serving as a scribe on 12/3/2019 at 11:52 AM to personally document services performed by Roxane Mccain MD based on my observations and the provider's statements to me.        Roxane Mccain MD  12/03/19 [id number]

## 2019-12-04 NOTE — NURSING NOTE
"Oncology Rooming Note    December 4, 2019 12:00 PM   Amberly Palafox is a 74 year old female who presents for:    Chief Complaint   Patient presents with     Oncology Clinic Visit     RETURN VISIT; SMALL CELL LUNG CANCER; VITALS TAKEN      Initial Vitals: BP (!) 173/64   Pulse 103   Temp 98.2  F (36.8  C) (Oral)   Resp 16   Ht 1.575 m (5' 2\")   Wt 52.9 kg (116 lb 9.6 oz)   SpO2 99%   BMI 21.33 kg/m   Estimated body mass index is 21.33 kg/m  as calculated from the following:    Height as of this encounter: 1.575 m (5' 2\").    Weight as of this encounter: 52.9 kg (116 lb 9.6 oz). Body surface area is 1.52 meters squared.  Mild Pain (2) Comment: Data Unavailable   No LMP recorded. Patient is postmenopausal.  Allergies reviewed: Yes  Medications reviewed: Yes    Medications: Medication refills not needed today.  Pharmacy name entered into [facility]:    [facility] [address] [place] [address] [address] [place] [address]  [facility] [place], [place] - [address] [place] [address]    Clinical concerns: No new concerns today  Dr Stubbs was notified.      Tanvi Dudley              "

## 2019-12-04 NOTE — PROGRESS NOTES
Infusion Nursing Note:  Amberly Palafox presents today for Cycle 8 Atezolizumab   Patient seen by provider today: Yes: Enoc    present during visit today: Not Applicable.    Note: N/A.    Intravenous Access:  Peripheral IV placed.    Treatment Conditions:  Lab Results   Component Value Date    HGB 11.3 12/03/2019     Lab Results   Component Value Date    WBC 3.8 12/03/2019      Lab Results   Component Value Date    ANEU 2.5 12/03/2019     Lab Results   Component Value Date     12/03/2019      Lab Results   Component Value Date     12/03/2019                   Lab Results   Component Value Date    POTASSIUM 3.9 12/03/2019           Lab Results   Component Value Date    MAG 2.0 12/03/2019            Lab Results   Component Value Date    CR 0.59 12/03/2019                   Lab Results   Component Value Date    JASPAL 8.8 12/03/2019   ALT 19  AST 21           Results reviewed, labs MET treatment parameters, ok to proceed with treatment.      Post Infusion Assessment:  Patient tolerated infusion without incident.  Blood return noted pre and post infusion.  No evidence of extravasations.  Access discontinued per protocol.       Discharge Plan:   Patient will return 12/26 for next appointment.  Patient discharged in stable condition accompanied by: self.  Departure Mode: Ambulatory.    Zahraa Peace RN

## 2019-12-04 NOTE — LETTER
12/4/2019       RE: Amberly Palafox  [address] [facility] [address]     Dear Colleague,    Thank you for referring your patient, Amberly Palafox, to the [facility]. Please see a copy of my visit note below.    [facility]  [facility]    FOLLOW-UP VISIT NOTE    PATIENT NAME: Amberly Palafox MRN # [id number]  DATE OF VISIT: December 4, 2019 YOB: 1945    CANCER TYPE: High grade neuroendocrine carcinoma  STAGE: Extensive  ECOG PS: 0    Cancer Staging  Small cell lung cancer (H)  Staging form: Lung, AJCC 8th Edition  - Clinical stage from 6/19/2019: Stage IV (cT2a, cN3, pM1c) - Signed by Candy Stubbs MD on 6/19/2019    PD-L1:  NGS:    SUMMARY  9/25/18 LDCT for screening through PCP. 3.5 x 2.9 cm ART mass  12/11/18 PET/CT. 3.3 x 3.3 cm ART mass (SUV 15.4), lobular component extending anteriorly and medially, L hilar HEATHER, bilateral axillary HEATHER; 1.8 x 1.1 cm R axillary node (SUV 2.1), 1.9 x 0.9 cm potential LN vs soft tissue nodule immediately adjacent to and anterior to R first rib and clavicle (SUV 3.1). 1 cm L periarotic and a few other small rounded retroperitoneal LN  5/29/19 CT chest w/o contrast. Mucoid impactions vs endobronchial tumor leading to ART mass, 4.4 x 3.8 cm ART mass, 0.3 cm ART nodule, 0.4, 0.3, 0.4 cm ART nodules, new since previous, 1.7 x 1.3 cm R axillary LN, 1.9 x 1.4 cm L axillary LN, additional mildly prominent bilateral axillary, subpectoral and supraclav LN, mediastinal adenopathy.   6/7/19 EBUS/bronch (Dr. Us). No endobronchial lesions. Radial EBUS transbronchial bx of ART mass  6/14/19 Brain MRI negative for mets  6/17/19 PET/CT. 5.1 x 4.4 cm ART mass (SUV 20.3), few small satellite nodules new since Dec, stable since 5/29. New LLL subpleural nodule, RLL nodule, bilateral axillary HEATHER; 2.2 x 1.3 cm L axillary node (SUV 3.7), 2.0 x 1.3 cm R axillary LN (SUV 4.1), additional hypermetabolic axillary  nodes, 1.3 x 0.8 cm L peribronchial LN (SUV 4.5), L hilar adenopathy, new 0.4 cm hypodensity R liver, stable L periaortic LN.  6/20~8/29/19     DENZEL Schmidt returns today for follow up of SCLC after 4 cycles of carbo etoposide atezo and 2 cycles of maintenance atezolizumab. Doing pretty well, although was seen in the ED yesterday for increased shortness of breath. Diagnosed with COPD exacerbation and given neb, prednisone. Already feeling better. Thinking about just doing 3 days of prednisone and leaving the last 2 days off (given 5 day course). Otherwise doing well. Went to [facility] and getting new insurance that will kick in 1/1. Therefore coming back to [place] for cancer treatment until then. Is glad to have moved. No constipation, diarrhea, numbness/tingling, leg swelling.     PAST MEDICAL HISTORY  SCLC as above  COPD. PFT 2013 FEV1 1.08 (48%), FVC 2.02 (69%), DLCOcor 12.9 (63%)  H/o Hashimoto's thyroiditis, now hypothyroidism  Osteopenia  Dyslipidemia  Vitamin D deficiency    CURRENT OUTPATIENT MEDICATIONS  Current Outpatient Medications   Medication Sig Dispense Refill     albuterol (PROAIR HFA/PROVENTIL HFA/VENTOLIN HFA) 108 (90 Base) MCG/ACT inhaler Inhale 2 puffs into the lungs every 4 hours as needed for shortness of breath / dyspnea 3 Inhaler 3     albuterol (PROVENTIL) (2.5 MG/3ML) 0.083% neb solution Take 1 vial (2.5 mg) by nebulization every 6 hours as needed for shortness of breath / dyspnea or wheezing 300 mL 11     B Complex-C (SUPER B COMPLEX PO) Take 1 tablet by mouth daily       cholecalciferol (VITAMIN D3) 5000 units CAPS capsule Take by mouth daily       doxycycline hyclate (VIBRAMYCIN) 100 MG capsule Take 1 capsule (100 mg) by mouth 2 times daily for 7 days 14 capsule 0     EUTHYROX 150 MCG tablet TAKE 1 TABLET BY MOUTH ONCE DAILY 90 tablet 3     FISH OIL        levothyroxine (SYNTHROID/LEVOTHROID) 150 MCG tablet Take 1 tablet (150 mcg) by mouth daily 90 tablet 3     Multiple  "Vitamins-Minerals (MULTIVITAMIN OR) Take  by mouth.       ondansetron (ZOFRAN) 8 MG tablet Take 1 tablet (8 mg) by mouth every 8 hours as needed for nausea 30 tablet 11     predniSONE (DELTASONE) 20 MG tablet Take two tablets (= 40mg) each day for 5 (five) days 10 tablet 0     prochlorperazine (COMPAZINE) 10 MG tablet Take 1 tablet (10 mg) by mouth every 6 hours as needed (Nausea/Vomiting) 30 tablet 11     SPIRIVA RESPIMAT 2.5 MCG/ACT inhaler USE 2 INHALATIONS DAILY 12 g 4     varenicline (CHANTIX CONTINUING MONTH RANDAL) 1 MG tablet Take 1 tablet (1 mg) by mouth 2 times daily 60 tablet 1     ALLERGIES  No Known Allergies    REVIEW OF SYSTEMS  As above in the HPI, o/w complete 12-point ROS was negative.    PHYSICAL EXAM  BP (!) 173/64   Pulse 103   Temp 98.2  F (36.8  C) (Oral)   Resp 16   Ht 1.575 m (5' 2\")   Wt 52.9 kg (116 lb 9.6 oz)   SpO2 99%   BMI 21.33 kg/m     Wt Readings from Last 3 Encounters:   12/03/19 52.6 kg (116 lb)   11/07/19 52.6 kg (116 lb)   10/17/19 51.8 kg (114 lb 1.6 oz)     GEN: NAD  HEENT: EOMI, no icterus, injection or pallor. Oropharynx is clear.  NECK: no cervical or supraclavicular lymphadenopathy  LUNGS: clear bilaterally  CV: regular, no murmurs, rubs, or gallops  ABDOMEN: soft, non-tender, non-distended, normal bowel sounds  EXT: warm, well perfused, no edema  NEURO: alert  SKIN: no rashes    LABORATORY AND IMAGING STUDIES  Results for BRENT ALEJANDRE JOSE ANGEL (MRN [id number]) as of 12/4/2019 21:02   12/3/2019 12:42 12/3/2019 13:50 12/4/2019 13:00   Sodium 138  136   Potassium 3.9  3.8   Chloride 105  106   Carbon Dioxide 29  24   Urea Nitrogen 18  15   Creatinine 0.59  0.40 (L)   GFR Estimate >90  >90   GFR Estimate If Black >90  >90   Calcium 8.8  9.0   Anion Gap 4  5   Magnesium 2.0     Albumin   3.4   Protein Total   7.7   Bilirubin Total   0.4   Alkaline Phosphatase   67   ALT   19   AST   21   Troponin I ES <0.015     Glucose 76  84   WBC 3.8 (L)     Hemoglobin 11.3 (L)   "   Hematocrit 34.8 (L)     Platelet Count 234     RBC Count 3.64 (L)     MCV 96     MCH 31.0     MCHC 32.5     RDW 13.1     Diff Method Automated Method     % Neutrophils 63.7     % Lymphocytes 26.3     % Monocytes 5.5     % Eosinophils 3.9     % Basophils 0.3     % Immature Granulocytes 0.3     Nucleated RBCs 0     Absolute Neutrophil 2.5     Absolute Lymphocytes 1.0     Absolute Monocytes 0.2     Absolute Eosinophils 0.2     Absolute Basophils 0.0     Abs Immature Granulocytes 0.0     Absolute Nucleated RBC 0.0       CT Chest (PE) Abdomen Pelvis w Contrast  Narrative: CT CHEST PULMONARY EMBOLUS ABDOMEN AND PELVIS WITH CONTRAST    12/3/2019 1:50 PM     HISTORY: Shortness of breath; known SCC presents with cough and  shortness of breath, scheduled CT scan later today.    TECHNIQUE:  59 mL Isovue-370. CT images of the chest, abdomen, and  pelvis after nonionic intravenous contrast. Radiation dose for this  scan was reduced using automated exposure control, adjustment of the  mA and/or kV according to patient size, or iterative reconstruction  technique.    COMPARISON: 9/19/2019    FINDINGS:     Chest: No evidence of pulmonary embolism or acute thoracic aortic  abnormality. Mild-to-moderate coronary atherosclerosis. No pleural or  pericardial effusion. No pneumothorax. Left upper lobe pulmonary  nodule measures up to 2.8 cm (series 5, image 47), slightly smaller  than on the prior study. The lungs are emphysematous. Enlarged right  axillary lymph node measures 1.6 x 1.1 cm (series 5, image 33),  similar to prior. Enlarged left axillary lymph nodes are also similar  to prior. No pathologically enlarged thoracic lymph nodes.    Abdomen and pelvis: Subcentimeter lesion in the posterosuperior right  lobe of the liver (series 12, image 25) is too small to characterize  but stable. No other liver lesions. The gallbladder, spleen, pancreas,  and adrenal glands are unremarkable. No hydronephrosis or solid renal  mass.  Significant nonaneurysmal aortic atherosclerosis extends into  the iliac arteries. No evidence of bowel obstruction or free air. No  abdominal, retroperitoneal, or pelvic lymphadenopathy. No pelvic free  fluid or mass.    No worrisome bone lesions.  Impression: IMPRESSION:  1. No evidence of pulmonary embolism.  2. Left upper lobe nodule has decreased in size compared to the prior  study.  3. Bilateral axillary adenopathy is not changed.  4. No acute abnormality demonstrated in the abdomen or pelvis.    BERNABE OLIVIA MD     Imaging was personally reviewed     ASSESSMENT AND PLAN  High grade neuroendocrine carcinoma: SD after great SC on chemo + atezo and now maintenance atezo. Continue the same. Restage after 3 more cycles of atezo. Will likely be in [facility].     COPD exacerbation: Went to ED yesterday, already better after 2 doses of prednisone and albuterol nebs. Gave new script for nebs with refills.    Anxiety: Still has a lot of thoughts about progression, etc. Offered to schedule apt with Palliative Care SW; declined. Continue to offer support.     Mild hyperthyroidism: On levothyroxine 150 mcg daily. Refills sent.     Social: Moved to [facility] after last visit with me. Coming back here for treatment until at least the new year due to insurance coverage issues.     A total of 30 minutes was spent with the patient, >50% of which was spent in counseling and coordination of care.    Candy Stubbs MD    Hematology, Oncology and Transplantation

## 2019-12-04 NOTE — PROGRESS NOTES
[facility]  [facility] CANCER CLINIC    FOLLOW-UP VISIT NOTE    PATIENT NAME: Amberly Palafox MRN # [id number]  DATE OF VISIT: December 4, 2019 YOB: 1945    CANCER TYPE: High grade neuroendocrine carcinoma  STAGE: Extensive  ECOG PS: 0    Cancer Staging  Small cell lung cancer (H)  Staging form: Lung, AJCC 8th Edition  - Clinical stage from 6/19/2019: Stage IV (cT2a, cN3, pM1c) - Signed by Candy Stubbs MD on 6/19/2019    PD-L1:  NGS:    SUMMARY  9/25/18 LDCT for screening through PCP. 3.5 x 2.9 cm ART mass  12/11/18 PET/CT. 3.3 x 3.3 cm ART mass (SUV 15.4), lobular component extending anteriorly and medially, L hilar HEATHER, bilateral axillary HEATHER; 1.8 x 1.1 cm R axillary node (SUV 2.1), 1.9 x 0.9 cm potential LN vs soft tissue nodule immediately adjacent to and anterior to R first rib and clavicle (SUV 3.1). 1 cm L periarotic and a few other small rounded retroperitoneal LN  5/29/19 CT chest w/o contrast. Mucoid impactions vs endobronchial tumor leading to ART mass, 4.4 x 3.8 cm ART mass, 0.3 cm ATR nodule, 0.4, 0.3, 0.4 cm ART nodules, new since previous, 1.7 x 1.3 cm R axillary LN, 1.9 x 1.4 cm L axillary LN, additional mildly prominent bilateral axillary, subpectoral and supraclav LN, mediastinal adenopathy.   6/7/19 EBUS/bronch (Dr. Us). No endobronchial lesions. Radial EBUS transbronchial bx of ART mass  6/14/19 Brain MRI negative for mets  6/17/19 PET/CT. 5.1 x 4.4 cm ART mass (SUV 20.3), few small satellite nodules new since Dec, stable since 5/29. New LLL subpleural nodule, RLL nodule, bilateral axillary HEATHER; 2.2 x 1.3 cm L axillary node (SUV 3.7), 2.0 x 1.3 cm R axillary LN (SUV 4.1), additional hypermetabolic axillary nodes, 1.3 x 0.8 cm L peribronchial LN (SUV 4.5), L hilar adenopathy, new 0.4 cm hypodensity R liver, stable L periaortic LN.  6/20~8/29/19     DENZEL Schmidt returns today for follow up of SCLC after 4 cycles of carbo etoposide atezo and 2  cycles of maintenance atezolizumab. Doing pretty well, although was seen in the ED yesterday for increased shortness of breath. Diagnosed with COPD exacerbation and given neb, prednisone. Already feeling better. Thinking about just doing 3 days of prednisone and leaving the last 2 days off (given 5 day course). Otherwise doing well. Went to [facility] and getting new insurance that will kick in 1/1. Therefore coming back to [place] for cancer treatment until then. Is glad to have moved. No constipation, diarrhea, numbness/tingling, leg swelling.     PAST MEDICAL HISTORY  SCLC as above  COPD. PFT 2013 FEV1 1.08 (48%), FVC 2.02 (69%), DLCOcor 12.9 (63%)  H/o Hashimoto's thyroiditis, now hypothyroidism  Osteopenia  Dyslipidemia  Vitamin D deficiency    CURRENT OUTPATIENT MEDICATIONS  Current Outpatient Medications   Medication Sig Dispense Refill     albuterol (PROAIR HFA/PROVENTIL HFA/VENTOLIN HFA) 108 (90 Base) MCG/ACT inhaler Inhale 2 puffs into the lungs every 4 hours as needed for shortness of breath / dyspnea 3 Inhaler 3     albuterol (PROVENTIL) (2.5 MG/3ML) 0.083% neb solution Take 1 vial (2.5 mg) by nebulization every 6 hours as needed for shortness of breath / dyspnea or wheezing 300 mL 11     B Complex-C (SUPER B COMPLEX PO) Take 1 tablet by mouth daily       cholecalciferol (VITAMIN D3) 5000 units CAPS capsule Take by mouth daily       doxycycline hyclate (VIBRAMYCIN) 100 MG capsule Take 1 capsule (100 mg) by mouth 2 times daily for 7 days 14 capsule 0     EUTHYROX 150 MCG tablet TAKE 1 TABLET BY MOUTH ONCE DAILY 90 tablet 3     FISH OIL        levothyroxine (SYNTHROID/LEVOTHROID) 150 MCG tablet Take 1 tablet (150 mcg) by mouth daily 90 tablet 3     Multiple Vitamins-Minerals (MULTIVITAMIN OR) Take  by mouth.       ondansetron (ZOFRAN) 8 MG tablet Take 1 tablet (8 mg) by mouth every 8 hours as needed for nausea 30 tablet 11     predniSONE (DELTASONE) 20 MG tablet Take two tablets (= 40mg) each day for 5 (five)  "days 10 tablet 0     prochlorperazine (COMPAZINE) 10 MG tablet Take 1 tablet (10 mg) by mouth every 6 hours as needed (Nausea/Vomiting) 30 tablet 11     SPIRIVA RESPIMAT 2.5 MCG/ACT inhaler USE 2 INHALATIONS DAILY 12 g 4     varenicline (CHANTIX CONTINUING MONTH RANDAL) 1 MG tablet Take 1 tablet (1 mg) by mouth 2 times daily 60 tablet 1     ALLERGIES  No Known Allergies    REVIEW OF SYSTEMS  As above in the HPI, o/w complete 12-point ROS was negative.    PHYSICAL EXAM  BP (!) 173/64   Pulse 103   Temp 98.2  F (36.8  C) (Oral)   Resp 16   Ht 1.575 m (5' 2\")   Wt 52.9 kg (116 lb 9.6 oz)   SpO2 99%   BMI 21.33 kg/m    Wt Readings from Last 3 Encounters:   12/03/19 52.6 kg (116 lb)   11/07/19 52.6 kg (116 lb)   10/17/19 51.8 kg (114 lb 1.6 oz)     GEN: NAD  HEENT: EOMI, no icterus, injection or pallor. Oropharynx is clear.  NECK: no cervical or supraclavicular lymphadenopathy  LUNGS: clear bilaterally  CV: regular, no murmurs, rubs, or gallops  ABDOMEN: soft, non-tender, non-distended, normal bowel sounds  EXT: warm, well perfused, no edema  NEURO: alert  SKIN: no rashes    LABORATORY AND IMAGING STUDIES  Results for BRENT ALEJANDRE (MRN [id number]) as of 12/4/2019 21:02   12/3/2019 12:42 12/3/2019 13:50 12/4/2019 13:00   Sodium 138  136   Potassium 3.9  3.8   Chloride 105  106   Carbon Dioxide 29  24   Urea Nitrogen 18  15   Creatinine 0.59  0.40 (L)   GFR Estimate >90  >90   GFR Estimate If Black >90  >90   Calcium 8.8  9.0   Anion Gap 4  5   Magnesium 2.0     Albumin   3.4   Protein Total   7.7   Bilirubin Total   0.4   Alkaline Phosphatase   67   ALT   19   AST   21   Troponin I ES <0.015     Glucose 76  84   WBC 3.8 (L)     Hemoglobin 11.3 (L)     Hematocrit 34.8 (L)     Platelet Count 234     RBC Count 3.64 (L)     MCV 96     MCH 31.0     MCHC 32.5     RDW 13.1     Diff Method Automated Method     % Neutrophils 63.7     % Lymphocytes 26.3     % Monocytes 5.5     % Eosinophils 3.9     % Basophils 0.3     % " Immature Granulocytes 0.3     Nucleated RBCs 0     Absolute Neutrophil 2.5     Absolute Lymphocytes 1.0     Absolute Monocytes 0.2     Absolute Eosinophils 0.2     Absolute Basophils 0.0     Abs Immature Granulocytes 0.0     Absolute Nucleated RBC 0.0       CT Chest (PE) Abdomen Pelvis w Contrast  Narrative: CT CHEST PULMONARY EMBOLUS ABDOMEN AND PELVIS WITH CONTRAST    12/3/2019 1:50 PM     HISTORY: Shortness of breath; known SCC presents with cough and  shortness of breath, scheduled CT scan later today.    TECHNIQUE:  59 mL Isovue-370. CT images of the chest, abdomen, and  pelvis after nonionic intravenous contrast. Radiation dose for this  scan was reduced using automated exposure control, adjustment of the  mA and/or kV according to patient size, or iterative reconstruction  technique.    COMPARISON: 9/19/2019    FINDINGS:     Chest: No evidence of pulmonary embolism or acute thoracic aortic  abnormality. Mild-to-moderate coronary atherosclerosis. No pleural or  pericardial effusion. No pneumothorax. Left upper lobe pulmonary  nodule measures up to 2.8 cm (series 5, image 47), slightly smaller  than on the prior study. The lungs are emphysematous. Enlarged right  axillary lymph node measures 1.6 x 1.1 cm (series 5, image 33),  similar to prior. Enlarged left axillary lymph nodes are also similar  to prior. No pathologically enlarged thoracic lymph nodes.    Abdomen and pelvis: Subcentimeter lesion in the posterosuperior right  lobe of the liver (series 12, image 25) is too small to characterize  but stable. No other liver lesions. The gallbladder, spleen, pancreas,  and adrenal glands are unremarkable. No hydronephrosis or solid renal  mass. Significant nonaneurysmal aortic atherosclerosis extends into  the iliac arteries. No evidence of bowel obstruction or free air. No  abdominal, retroperitoneal, or pelvic lymphadenopathy. No pelvic free  fluid or mass.    No worrisome bone lesions.  Impression:  IMPRESSION:  1. No evidence of pulmonary embolism.  2. Left upper lobe nodule has decreased in size compared to the prior  study.  3. Bilateral axillary adenopathy is not changed.  4. No acute abnormality demonstrated in the abdomen or pelvis.    BERNABE OLIVIA MD     Imaging was personally reviewed     ASSESSMENT AND PLAN  High grade neuroendocrine carcinoma: SD after great NM on chemo + atezo and now maintenance atezo. Continue the same. Restage after 3 more cycles of atezo. [facility]     COPD exacerbation: Went to ED yesterday, already better after 2 doses of prednisone and albuterol nebs. Gave new script for nebs with refills.    Anxiety: Still has a lot of thoughts about progression, etc. Offered to schedule apt with Palliative Care SW; declined. Continue to offer support.     Mild hyperthyroidism: On levothyroxine 150 mcg daily. Refills sent.     Social: [facility]. Coming back here for treatment until at least the new year due to insurance coverage issues.     A total of 30 minutes was spent with the patient, >50% of which was spent in counseling and coordination of care.    Candy Stubbs MD    Hematology, Oncology and Transplantation

## 2019-12-12 NOTE — TELEPHONE ENCOUNTER
[facility] Telephone Triage Note    Assessment: Patient called in to triage reporting the following symptoms: Pt seen in ED on 12/3 for COPD exacerbation and give 5 days prednisone and 7 days doxycycline. Patient then seen in clinic with Dr Stubbs on12/4/19, notes state pt symptoms improving. Pt reports now that she finished prescribed meds from ED and still is wheezing with SOB at night. MyChart messages state nebs helped but on phone pt denies rohini they are helping. Patient requesting refill of doxycycline or zithromax.    Recommendations: Discussed with CARLY Colin CNP.  Clinic visit  today with the following procedures/labs:  X-ray of chest, 2-view,  CBC and CMP,  no infusion.    Follow-Up: Patient refused appointment and was upset that medication would not be called in. Writer explained that since Norma had not previously seen her for this episode, she would be unable to prescribe additional medication not shown to have been effective and she also wants to r/o pneumonitis as she is on immunotherapy. Patient again refused and writer advised that we would support he choice but could not send in medication. Patient again stated that she was not willing to come in and ended call.

## 2019-12-16 ENCOUNTER — TELEPHONE (OUTPATIENT)
Dept: ONCOLOGY | Facility: CLINIC | Age: 74
End: 2019-12-16

## 2019-12-17 NOTE — TELEPHONE ENCOUNTER
Received [facility] scheduling request to cancel appointments as patient passed away on 12.15.2019

## 2024-09-18 NOTE — PATIENT INSTRUCTIONS
Contact Numbers    [facility] Main Line: [phone]  [facility] Triage and after hours / weekends / holidays:  [phone]      Please call the triage or after hours line if you experience a temperature greater than or equal to 100.5, shaking chills, have uncontrolled nausea, vomiting and/or diarrhea, dizziness, shortness of breath, chest pain, bleeding, unexplained bruising, or if you have any other new/concerning symptoms, questions or concerns.      If you are having any concerning symptoms or wish to speak to a provider before your next infusion visit, please call your care coordinator or triage to notify them so we can adequately serve you.     If you need a refill on a narcotic prescription or other medication, please call before your infusion appointment.           July 2019 Sunday Monday Tuesday Wednesday Thursday Friday Saturday        1     2     3     4     5     6       7     8    Outpatient Visit   8:08 AM   Norma Crespo APRN CNP   [facility]    RETURN   8:50 AM   (20 min.)    LAB DRAW 1   [facility] 9    [facility] RETURN   8:15 AM   (50 min.)   Norma Crespo APRN CNP   [facility] 10     11     12     13       14     15     16    [facility] DRAW   9:45 AM   (15 min.)    [facility] DRAW   [facility] Lab Draw    [facility] RETURN   9:55 AM   (50 min.)   Norma Crespo APRN CNP   [facility]    [facility] ONC INFUSION 180  12:00 PM   (180 min.)   [facility] ONCOLOGY INFUSION   [facility] 17    [facility] ONC INFUSION 120  12:30 PM   (120 min.)   [facility] ONCOLOGY INFUSION   [facility] 18    [facility] ONC INFUSION 120  10:30 AM   (120 min.)    ONCOLOGY INFUSION   [facility] 19     20       21     22     23     24     25     26     27       28     29     30     31 August 2019 Sunday Monday Tuesday Wednesday Thursday Friday Saturday                        1     2    CT CHEST/ABDOMEN/PELVIS W  10:30 AM   (20 min.)   UCCT2   [facility] CT 3       4     5     6    [facility] DRAW  10:15 AM   (15 min.)    [facility] DRAW   [facility] Lab Draw    [facility] RETURN  10:30 AM   (30 min.)   [facility]ubbs [facility]  11:30 AM   (180 min.)   [facility] ON[facility] INFUSION   [facility]    [facility] RETURN   2:15 PM   (30 min.)   Es Palma MD   [facility] 7    P ONC INFUSION 120  10:30 AM   (120 min.)    ONCOLOGY INFUSION   [facility] 8    P ONC INFUSION 120  10:30 AM   (120 min.)    ONCOLOGY INFUSION   [facility] 9     10       11     12     13     14     15     16     17       18     19     20     21     22     23     24       25     26     27     28     29     30     31                    Recent Results (from the past 24 hour(s))   CBC with platelets differential    Collection Time: 07/16/19 10:13 AM   Result Value Ref Range    WBC 6.3 4.0 - 11.0 10e9/L    RBC Count 3.92 3.8 - 5.2 10e12/L    Hemoglobin 11.8 11.7 - 15.7 g/dL    Hematocrit 36.8 35.0 - 47.0 %    MCV 94 78 - 100 fl    MCH 30.1 26.5 - 33.0 pg    MCHC 32.1 31.5 - 36.5 g/dL    RDW 16.3 (H) 10.0 - 15.0 %    Platelet Count 351 150 - 450 10e9/L    Diff Method Automated Method     % Neutrophils 79.7 %    % Lymphocytes 14.1 %    % Monocytes 5.1 %    % Eosinophils 0.2 %    % Basophils 0.3 %    % Immature Granulocytes 0.6 %    Nucleated RBCs 0 0 /100    Absolute Neutrophil 5.0 1.6 - 8.3 10e9/L    Absolute Lymphocytes 0.9 0.8 - 5.3 10e9/L    Absolute Monocytes 0.3 0.0 - 1.3 10e9/L    Absolute Eosinophils 0.0 0.0 - 0.7 10e9/L    Absolute Basophils 0.0 0.0 - 0.2 10e9/L    Abs Immature Granulocytes 0.0 0 - 0.4 10e9/L    Absolute Nucleated RBC 0.0    Comprehensive metabolic panel    Collection Time: 07/16/19 10:13 AM   Result Value Ref Range    Sodium 136 133 - 144 mmol/L     Potassium 3.7 3.4 - 5.3 mmol/L    Chloride 101 94 - 109 mmol/L    Carbon Dioxide 28 20 - 32 mmol/L    Anion Gap 6 3 - 14 mmol/L    Glucose 100 (H) 70 - 99 mg/dL    Urea Nitrogen 12 7 - 30 mg/dL    Creatinine 0.50 (L) 0.52 - 1.04 mg/dL    GFR Estimate >90 >60 mL/min/[1.73_m2]    GFR Estimate If Black >90 >60 mL/min/[1.73_m2]    Calcium 8.6 8.5 - 10.1 mg/dL    Bilirubin Total 0.3 0.2 - 1.3 mg/dL    Albumin 3.4 3.4 - 5.0 g/dL    Protein Total 8.4 6.8 - 8.8 g/dL    Alkaline Phosphatase 83 40 - 150 U/L    ALT 18 0 - 50 U/L    AST 13 0 - 45 U/L   TSH with free T4 reflex    Collection Time: 07/16/19 10:13 AM   Result Value Ref Range    TSH 1.44 0.40 - 4.00 mU/L          chlorhexidine chlorhexidine

## (undated) DEVICE — SOL NACL 0.9% IRRIG 1000ML BOTTLE 2F7124

## (undated) DEVICE — LABEL MEDICATION SYSTEM 3303-P

## (undated) DEVICE — LUBRICANT INST KIT ENDO-LUBE 220-90

## (undated) DEVICE — PITCHER STERILE 1000ML  SSK9004A

## (undated) DEVICE — ENDO VALVE SUCTION BRONCH EVIS MAJ-209

## (undated) DEVICE — SYR 10ML SLIP TIP W/O NDL 303134

## (undated) DEVICE — ENDO FORCEP ALLIGATOR JAW BIOPSY 2MMX100CM FB-211D

## (undated) DEVICE — ENDO VALVE SUCTION ULTRASOUND BRONCH MAJ-1414

## (undated) DEVICE — ENDO ADPT BRONCH SWIVEL Y A1002

## (undated) DEVICE — ENDO VALVE BX EVIS MAJ-210

## (undated) DEVICE — KIT ENDO FIRST STEP DISINFECTANT 200ML W/POUCH EP-4

## (undated) DEVICE — ENDO VALVE SYR NDL KIT ULTRASOUND BRONCH NA-201SX-4022-A

## (undated) DEVICE — TUBING SUCTION 10'X3/16" N510

## (undated) DEVICE — SYR 30ML SLIP TIP W/O NDL 302833

## (undated) DEVICE — CONNECTOR STOPCOCK 3 WAY MALE LL HI-FLO MX9311L

## (undated) DEVICE — SOL WATER 10ML VIAL 6332318510

## (undated) DEVICE — ANTIFOG SOLUTION W/FOAM PAD 31142527

## (undated) RX ORDER — PROPOFOL 10 MG/ML
INJECTION, EMULSION INTRAVENOUS
Status: DISPENSED
Start: 2019-01-01

## (undated) RX ORDER — ONDANSETRON 2 MG/ML
INJECTION INTRAMUSCULAR; INTRAVENOUS
Status: DISPENSED
Start: 2019-01-01

## (undated) RX ORDER — PHENYLEPHRINE HCL IN 0.9% NACL 1 MG/10 ML
SYRINGE (ML) INTRAVENOUS
Status: DISPENSED
Start: 2019-01-01

## (undated) RX ORDER — FENTANYL CITRATE 50 UG/ML
INJECTION, SOLUTION INTRAMUSCULAR; INTRAVENOUS
Status: DISPENSED
Start: 2019-01-01

## (undated) RX ORDER — IPRATROPIUM BROMIDE AND ALBUTEROL SULFATE 2.5; .5 MG/3ML; MG/3ML
SOLUTION RESPIRATORY (INHALATION)
Status: DISPENSED
Start: 2019-01-01

## (undated) RX ORDER — LIDOCAINE HYDROCHLORIDE 20 MG/ML
INJECTION, SOLUTION EPIDURAL; INFILTRATION; INTRACAUDAL; PERINEURAL
Status: DISPENSED
Start: 2019-01-01

## (undated) RX ORDER — DEXAMETHASONE SODIUM PHOSPHATE 4 MG/ML
INJECTION, SOLUTION INTRA-ARTICULAR; INTRALESIONAL; INTRAMUSCULAR; INTRAVENOUS; SOFT TISSUE
Status: DISPENSED
Start: 2019-01-01